# Patient Record
Sex: MALE | Race: WHITE | NOT HISPANIC OR LATINO | Employment: OTHER | URBAN - METROPOLITAN AREA
[De-identification: names, ages, dates, MRNs, and addresses within clinical notes are randomized per-mention and may not be internally consistent; named-entity substitution may affect disease eponyms.]

---

## 2020-06-22 NOTE — PROGRESS NOTES
Subjective:     CC:  Diagnoses of Obstructive sleep apnea, Mild asthma without complication, unspecified whether persistent, Hypertension, unspecified type, Obsessive-compulsive disorder, unspecified type, Attention deficit hyperactivity disorder (ADHD), combined type, Non-toxic multinodular goiter, History of squamous cell carcinoma of skin, Screening for colorectal cancer, Overweight, Skin lump of leg, right, and Vitamin D deficiency were pertinent to this visit.    HISTORY OF THE PRESENT ILLNESS: Patient is a 64 y.o. male. This pleasant patient is here today to establish care and discuss medication refills. His prior PCP was Dr. De Paz in [place].    Obstructive sleep apnea  Pt has been on CPAP for 1.5 years and reports being compliant.      Asthma  Onset: early 2020  Pt states he did a pulmonary function test early 2020 that showed he has mild asthma with borderline results.  States he has never had to use asthma.  He has been exercising more to help lose weight with running without any asthma flares.    HTN (hypertension)  Onset: about 2018 when his BP was about 180.  He has been taking lisinopril since that time.  No negative Ses from lisinopril.      Obsessive compulsive disorder  Chronic, has been taking prozac 40mg since 2005 with improvement in symptoms.  No negative SEs from his medications.  No longer checks doors repetitively as he previously did.    Attention deficit hyperactivity disorder (ADHD), combined type  Chronic, states he had symptoms as a child with difficulty focusing.  States the prozac is helping keep him focused and without negative SEs.    Non-toxic multinodular goiter  Chronic.  FNA benign 2008.  7/2012 thyroid US was stable RML nodule.  7/2013 stable thyroid ultrasound, US in 3-5 years advised.  10/2019 stable US with no further workup inndicated.    History of squamous cell carcinoma of skin  Dx 2020 and s/p removal.  Was located on his left cheek.  No recurrence of abnormal skin  appearance.    Overweight  Pt has recently started exercising.  He is eating a well balanced diet.    Skin lump of leg, right  Onset: several months.  He reports it is slowly increasing in size.  He has hx of fatty tumors.  Area is not red and no discharge.      Allergies: Patient has no allergy information on record.    Current Outpatient Medications Ordered in Epic   Medication Sig Dispense Refill   • cetirizine (ZYRTEC ALLERGY) 10 MG Tab Take 10 mg by mouth every day.     • albuterol (VENTOLIN HFA) 108 (90 Base) MCG/ACT Aero Soln inhalation aerosol Inhale 2 Puffs by mouth every four hours as needed.     • Peak Flow Meter ([employer] PEAK FLOW METER) Device 1 Units by Does not apply route Once.     • Spacer/Aero-Holding Chambers (AEROCHAMBER Z-STAT PLUS/LARGE) Misc 1 Application by Does not apply route Once.     • aspirin 81 MG tablet Take 81 mg by mouth every day.     • Multiple Vitamins-Minerals (MULTIVITAMIN ADULT PO) Take 1 Tab by mouth every day.     • Vitamin D, Ergocalciferol, 50 MCG (2000 UT) Cap Take 3 Tabs by mouth every day.     • DHA-EPA-Vitamin E (OMEGA-3 COMPLEX PO) Take 1 tablet by mouth every day.     • Saw Palmetto, Serenoa repens, (SAW PALMETTO PO) Take 320 mg by mouth every day.     • fluoxetine (PROZAC) 40 MG capsule Take 1 Cap by mouth every day. 90 Cap 1   • lisinopril (PRINIVIL) 40 MG tablet Take 1 Tab by mouth every day. 90 Tab 1     No current Epic-ordered facility-administered medications on file.        History reviewed. No pertinent past medical history.    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       Social History     Tobacco Use   • Smoking status: Never Smoker   • Smokeless tobacco: Never Used   Substance Use Topics   • Alcohol use: Yes     Frequency: 4 or more times a week     Drinks per session: 1 or 2   • Drug use: Not Currently       Social History     Social History Narrative   • Not on file        Family History   Problem Relation Age of Onset   • Cancer Mother         breast cancer   • Hypertension Mother    • Stroke Father    • Cancer Other         prostate cancer   • Psychiatric Illness Other         depression   • Migraines Other    • Diabetes Neg Hx    • Hyperlipidemia Neg Hx        Health Maintenance: Pt states he recently got his shingles vaccines.  He is due for colonoscopy    ROS:   Gen: no fevers/chills, no changes in weight  Eyes: no changes in vision  ENT: no sore throat, no hearing loss, no bloody nose  Pulm: no sob, no cough  CV: no chest pain, no palpitations  GI: no nausea/vomiting, no diarrhea  : no dysuria  MSk: no myalgias  Skin: no rash  Neuro: no headaches, no numbness/tingling  Heme/Lymph: no easy bruising      Objective:     Exam: /74 (BP Location: Left arm, Patient Position: Sitting, BP Cuff Size: Adult long)   Pulse (!) 55   Temp 36.6 °C (97.9 °F) (Temporal)   Ht 1.829 m (6')   Wt 99.2 kg (218 lb 9.6 oz)   SpO2 97%  Body mass index is 29.65 kg/m².    General: Normal appearing. No distress.  HEENT: Normocephalic.  Canals are clear bilaterally, tympanic membranes are benign, mask in place.  Nasal and OP examinations deferred given COVID19 exposure risk  Eyes: Eyes conjunctiva clear lids without ptosis, pupils equal and reactive to light accommodation, ears normal shape and contour  Neck: Supple. Thyroid is not enlarged.  Pulmonary: Clear to ausculation.  Normal effort. No rales, ronchi, or wheezing.  Cardiovascular: Regular rate and rhythm without murmur. Carotid and radial pulses are intact and equal bilaterally.  Abdomen: Soft, nontender, nondistended. Normal bowel sounds. Liver and spleen are not palpable  Neurologic: No gross motor deficits  Lymph: No cervical or supraclavicular lymph nodes are palpable  Skin: Warm and dry.  +multiple actinic keratosis on his right arm and bilateral legs.  Right lower leg skin with a superficial about 1.5cm soft, fixed massed  without erythema or discharge.  Musculoskeletal: Normal gait. No extremity cyanosis, clubbing, or edema.  Psych: Normal mood and affect. Alert and oriented x3. Judgment and insight is normal.  PHQ2 =0    Assessment & Plan:   64 y.o. male with the following -    1. Obstructive sleep apnea  Chronic, stable, well controlled per patient with home CPAP.  -will monitor.    2. Mild asthma without complication, unspecified whether persistent  Chronic, stable, asymptomatic with no recent albuterol use.  Encouraged him to bring his inhaler with him at all times.  Will consider adding inhaled steroid in the future.    3. Hypertension, unspecified type  Chronic, stable, well controlled with lisinopril 40mg POD.  Refill given.  - lisinopril (PRINIVIL) 40 MG tablet; Take 1 Tab by mouth every day.  Dispense: 90 Tab; Refill: 1    4. Obsessive-compulsive disorder, unspecified type  Chronic, stable, well controlled with prozac 40mg POD.  Refill given.  - fluoxetine (PROZAC) 40 MG capsule; Take 1 Cap by mouth every day.  Dispense: 90 Cap; Refill: 1    5. Attention deficit hyperactivity disorder (ADHD), combined type  Chronic, stable, well controlled with prozac 40mg POD.  Refill given.  - fluoxetine (PROZAC) 40 MG capsule; Take 1 Cap by mouth every day.  Dispense: 90 Cap; Refill: 1    6. Non-toxic multinodular goiter  Chronic, asymptomatic.  Plan for repeat labs at his PE in 10/2020.    7. History of squamous cell carcinoma of skin  Pt is s/p removal.  Will monitor.  Will check his right skin lump as per below.    8. Screening for colorectal cancer  Pt desires colonoscopy for cancer screening.  He is asymptomatic.  Referral given.  - REFERRAL TO GI FOR COLONOSCOPY    9. Overweight  Chronic, stable.  Pt states he recently started exercising.  Dietary and exercising guidance given.  Will monitor    10. Skin lump of leg, right  New issue.  Likely lipoma given his history but will check US to for cyst vs mass (given he has hx of squamous  cancer on cheek).   - US-EXTREMITY NON VASCULAR UNILATERAL RIGHT; Future    11. Vitamin D deficiency  Pt has been on chronic vitamin d supplements but recently over the last few months increased from 2000IU daily to 6000IU daily.  Will check labs and adjust supplement prn  - REFERRAL TO GI FOR COLONOSCOPY  - VITAMIN 1,25 DIHYDROXY; Future      Return in about 1 week (around 7/1/2020) for skin lesion removal.     Pt to return 10-11/2020 for PE with labs.    Please note that this dictation was created using voice recognition software. I have made every reasonable attempt to correct obvious errors, but I expect that there are errors of grammar and possibly content that I did not discover before finalizing the note.

## 2020-06-24 ENCOUNTER — OFFICE VISIT (OUTPATIENT)
Dept: MEDICAL GROUP | Facility: MEDICAL CENTER | Age: 65
End: 2020-06-24
Payer: COMMERCIAL

## 2020-06-24 ENCOUNTER — HOSPITAL ENCOUNTER (OUTPATIENT)
Dept: LAB | Facility: MEDICAL CENTER | Age: 65
End: 2020-06-24
Attending: FAMILY MEDICINE
Payer: COMMERCIAL

## 2020-06-24 VITALS
HEART RATE: 55 BPM | DIASTOLIC BLOOD PRESSURE: 74 MMHG | BODY MASS INDEX: 29.61 KG/M2 | WEIGHT: 218.6 LBS | SYSTOLIC BLOOD PRESSURE: 116 MMHG | HEIGHT: 72 IN | OXYGEN SATURATION: 97 % | TEMPERATURE: 97.9 F

## 2020-06-24 DIAGNOSIS — R22.41 SKIN LUMP OF LEG, RIGHT: ICD-10-CM

## 2020-06-24 DIAGNOSIS — Z85.828 HISTORY OF SQUAMOUS CELL CARCINOMA OF SKIN: ICD-10-CM

## 2020-06-24 DIAGNOSIS — F90.2 ATTENTION DEFICIT HYPERACTIVITY DISORDER (ADHD), COMBINED TYPE: ICD-10-CM

## 2020-06-24 DIAGNOSIS — Z12.11 SCREENING FOR COLORECTAL CANCER: ICD-10-CM

## 2020-06-24 DIAGNOSIS — I10 HYPERTENSION, UNSPECIFIED TYPE: ICD-10-CM

## 2020-06-24 DIAGNOSIS — E04.2 NON-TOXIC MULTINODULAR GOITER: ICD-10-CM

## 2020-06-24 DIAGNOSIS — E55.9 VITAMIN D DEFICIENCY: ICD-10-CM

## 2020-06-24 DIAGNOSIS — F42.9 OBSESSIVE-COMPULSIVE DISORDER, UNSPECIFIED TYPE: ICD-10-CM

## 2020-06-24 DIAGNOSIS — G47.33 OBSTRUCTIVE SLEEP APNEA: ICD-10-CM

## 2020-06-24 DIAGNOSIS — J45.909 MILD ASTHMA WITHOUT COMPLICATION, UNSPECIFIED WHETHER PERSISTENT: ICD-10-CM

## 2020-06-24 DIAGNOSIS — Z12.12 SCREENING FOR COLORECTAL CANCER: ICD-10-CM

## 2020-06-24 DIAGNOSIS — E66.3 OVERWEIGHT: ICD-10-CM

## 2020-06-24 PROCEDURE — 36415 COLL VENOUS BLD VENIPUNCTURE: CPT

## 2020-06-24 PROCEDURE — 82652 VIT D 1 25-DIHYDROXY: CPT

## 2020-06-24 PROCEDURE — 99204 OFFICE O/P NEW MOD 45 MIN: CPT | Performed by: FAMILY MEDICINE

## 2020-06-24 RX ORDER — CETIRIZINE HYDROCHLORIDE 10 MG/1
10 TABLET ORAL PRN
COMMUNITY

## 2020-06-24 RX ORDER — LISINOPRIL 40 MG/1
40 TABLET ORAL DAILY
Qty: 90 TAB | Refills: 1 | Status: SHIPPED | OUTPATIENT
Start: 2020-06-24 | End: 2021-02-13

## 2020-06-24 RX ORDER — ALBUTEROL SULFATE 90 UG/1
2 AEROSOL, METERED RESPIRATORY (INHALATION) EVERY 4 HOURS PRN
COMMUNITY
Start: 2020-02-24 | End: 2020-09-21 | Stop reason: SDUPTHER

## 2020-06-24 RX ORDER — LISINOPRIL 40 MG/1
40 TABLET ORAL DAILY
COMMUNITY
End: 2020-06-24 | Stop reason: SDUPTHER

## 2020-06-24 RX ORDER — FLUOXETINE HYDROCHLORIDE 40 MG/1
40 CAPSULE ORAL DAILY
COMMUNITY
End: 2020-06-24 | Stop reason: SDUPTHER

## 2020-06-24 RX ORDER — ERGOCALCIFEROL (VITAMIN D2) 50 MCG
3 CAPSULE ORAL DAILY
COMMUNITY

## 2020-06-24 RX ORDER — FLUOXETINE HYDROCHLORIDE 40 MG/1
40 CAPSULE ORAL DAILY
Qty: 90 CAP | Refills: 1 | Status: SHIPPED | OUTPATIENT
Start: 2020-06-24 | End: 2020-09-21 | Stop reason: SDUPTHER

## 2020-06-24 RX ORDER — PEAK FLOW METER
1 EACH MISCELLANEOUS ONCE
COMMUNITY
Start: 2020-02-24 | End: 2022-02-23

## 2020-06-24 RX ORDER — INHALER,ASSIST DEVICE,LG MASK
1 SPACER (EA) MISCELLANEOUS ONCE
COMMUNITY
Start: 2020-02-24 | End: 2022-02-23

## 2020-06-24 SDOH — HEALTH STABILITY: MENTAL HEALTH: HOW MANY STANDARD DRINKS CONTAINING ALCOHOL DO YOU HAVE ON A TYPICAL DAY?: 1 OR 2

## 2020-06-24 SDOH — HEALTH STABILITY: MENTAL HEALTH: HOW OFTEN DO YOU HAVE A DRINK CONTAINING ALCOHOL?: 4 OR MORE TIMES A WEEK

## 2020-06-24 ASSESSMENT — PATIENT HEALTH QUESTIONNAIRE - PHQ9: CLINICAL INTERPRETATION OF PHQ2 SCORE: 0

## 2020-06-24 NOTE — ASSESSMENT & PLAN NOTE
Onset: about 2018 when his BP was about 180.  He has been taking lisinopril since that time.  No negative Ses from lisinopril.

## 2020-06-24 NOTE — ASSESSMENT & PLAN NOTE
Onset: several months.  He reports it is slowly increasing in size.  He has hx of fatty tumors.  Area is not red and no discharge.

## 2020-06-24 NOTE — ASSESSMENT & PLAN NOTE
Onset: early 2020  Pt states he did a pulmonary function test early 2020 that showed he has mild asthma with borderline results.  States he has never had to use asthma.  He has been exercising more to help lose weight with running without any asthma flares.

## 2020-06-24 NOTE — ASSESSMENT & PLAN NOTE
Chronic, states he had symptoms as a child with difficulty focusing.  States the prozac is helping keep him focused and without negative SEs.

## 2020-06-24 NOTE — ASSESSMENT & PLAN NOTE
Chronic.  FNA benign 2008.  7/2012 thyroid US was stable RML nodule.  7/2013 stable thyroid ultrasound, US in 3-5 years advised.  10/2019 stable US with no further workup inndicated.

## 2020-06-24 NOTE — ASSESSMENT & PLAN NOTE
Dx 2020 and s/p removal.  Was located on his left cheek.  No recurrence of abnormal skin appearance.

## 2020-06-24 NOTE — ASSESSMENT & PLAN NOTE
Chronic, has been taking prozac 40mg since 2005 with improvement in symptoms.  No negative SEs from his medications.  No longer checks doors repetitively as he previously did.

## 2020-06-26 LAB — 1,25(OH)2D3 SERPL-MCNC: 69.4 PG/ML (ref 19.9–79.3)

## 2020-06-29 ENCOUNTER — HOSPITAL ENCOUNTER (OUTPATIENT)
Dept: RADIOLOGY | Facility: MEDICAL CENTER | Age: 65
End: 2020-06-29
Attending: FAMILY MEDICINE
Payer: COMMERCIAL

## 2020-06-29 DIAGNOSIS — R22.41 SKIN LUMP OF LEG, RIGHT: ICD-10-CM

## 2020-06-29 PROCEDURE — 76882 US LMTD JT/FCL EVL NVASC XTR: CPT | Mod: RT

## 2020-06-30 PROBLEM — L57.0 AK (ACTINIC KERATOSIS): Status: ACTIVE | Noted: 2020-06-30

## 2020-06-30 NOTE — PROGRESS NOTES
Subjective:     CC: actinic keratosis removal, vaccine up date    HPI:   Martin presents today with     AK (actinic keratosis)  Pt would like skin lesions removed today. No fevers, chills, discharge.  He has lesions on his arms and legs.    Overweight  Pt has reduced his fatty food intake since last visit.    Skin lump of leg, right  Onset about 1/2020.  Pt states it is slowly increasing in size but no significant changed compared to last visit.  US previously showed no clear etiology.  Area is not red and no discharge.      No past medical history on file.    Social History     Tobacco Use   • Smoking status: Never Smoker   • Smokeless tobacco: Never Used   Substance Use Topics   • Alcohol use: Yes     Frequency: 4 or more times a week     Drinks per session: 1 or 2   • Drug use: Not Currently       Current Outpatient Medications Ordered in Epic   Medication Sig Dispense Refill   • cetirizine (ZYRTEC ALLERGY) 10 MG Tab Take 10 mg by mouth every day.     • albuterol (VENTOLIN HFA) 108 (90 Base) MCG/ACT Aero Soln inhalation aerosol Inhale 2 Puffs by mouth every four hours as needed.     • Spacer/Aero-Holding Chambers (AEROCHAMBER Z-STAT PLUS/LARGE) Misc 1 Application by Does not apply route Once.     • aspirin 81 MG tablet Take 81 mg by mouth every day.     • Multiple Vitamins-Minerals (MULTIVITAMIN ADULT PO) Take 1 Tab by mouth every day.     • Vitamin D, Ergocalciferol, 50 MCG (2000 UT) Cap Take 3 Tabs by mouth every day.     • DHA-EPA-Vitamin E (OMEGA-3 COMPLEX PO) Take 1 tablet by mouth every day.     • Saw Palmetto, Serenoa repens, (SAW PALMETTO PO) Take 320 mg by mouth every day.     • fluoxetine (PROZAC) 40 MG capsule Take 1 Cap by mouth every day. 90 Cap 1   • lisinopril (PRINIVIL) 40 MG tablet Take 1 Tab by mouth every day. 90 Tab 1   • Peak Flow Meter ([employer] PEAK FLOW METER) Device 1 Units by Does not apply route Once.       No current Epic-ordered facility-administered medications on file.         Allergies:  Patient has no known allergies.    Health Maintenance: pneumonia vaccine today, colonoscopy ordered last visit    ROS:  Gen: no fevers/chills, no changes in weight  Eyes: no changes in vision  ENT: no sore throat, no hearing loss, no bloody nose  Pulm: no sob, no cough  CV: no chest pain, no palpitations  GI: no nausea/vomiting, no diarrhea  : no dysuria  MSk: no myalgias  Skin: no rash  Neuro: no headaches, no numbness/tingling  Heme/Lymph: no easy bruising      Objective:     Exam:  /78 (BP Location: Right arm, Patient Position: Sitting, BP Cuff Size: Adult long)   Pulse 61   Temp 36.5 °C (97.7 °F) (Temporal)   Resp 16   Ht 1.829 m (6')   Wt 99.8 kg (220 lb)   SpO2 94%   BMI 29.84 kg/m²  Body mass index is 29.84 kg/m².    Gen: Alert and oriented, No apparent distress.  Neck: Neck is supple without lymphadenopathy.  Lungs: Normal effort, CTA bilaterally, no wheezes, rhonchi, or rales  CV: Regular rate and rhythm. No murmurs, rubs, or gallops.  Ext: No clubbing, cyanosis, edema.  +multiple irregular scaly stuck-on appearance on on his right arm, left arm and bilateral legs.  Right lower leg skin with a superficial about 1.5cm soft, fixed massed without erythema or discharge.    Patient has hx of skin cancer and would like the lesions removed      PROCEDURE: CRYOTHERAPY  Discussed risks and benefits of cryotherapy including but not limited to scarring, hyperpigmentation, hypopigmentation, hypertrophic scarring, keiloid scarring, incomplete or no resolution of lesions treated,pain, undesirable cosemetic result, blistering, potential need for additional treatment including more invasive treatment. Patient expresses understanding and verbally acknowledges risks and consent to treatment. 2  applications of cryotherapy with 3 second freeze thaw cycle was applied to  7AK's on left leg, 4AKs on left leg, 2ALs on right arm.  Patient tolerated procedure well. There were no complications.  Aftercare instructions given.      Assessment & Plan:     64 y.o. male with the following -     1. AK (actinic keratosis)  Chronic issue, stable.  Given risk for lesions becoming cancerous in the future, cryotherapy offered and performed today.  After care instructions given and pt to follow-up if lesions persist or reoccur. Patient expressed understanding and agreement with plan.    2. Need for vaccination  Pt has asthma, but reports not having his pneumonia shot in the past. Pt desires vaccination.  Risks and benefits discussed.  No contraindications today.  Vaccine given.  Follow-up precautions discussed.  - Pneumovax Vaccine (PPSV23)    3. Overweight  Chronic, stable.  Dietary and exercise guidance given.    4. Skin lump of leg, right  Chronic, slowly increasing in size per patient report.  Recent US without clear etiology noted.  The lesion is soft but appears fixed and does not move despite moving his skin.  Gen surgery referral given for evaluation and possible biopsy. Patient expressed understanding and agreement with plan.  - REFERRAL TO GENERAL SURGERY      Return in about 5 months (around 12/1/2020) for Annual.    Please note that this dictation was created using voice recognition software. I have made every reasonable attempt to correct obvious errors, but I expect that there are errors of grammar and possibly content that I did not discover before finalizing the note.

## 2020-07-01 ENCOUNTER — OFFICE VISIT (OUTPATIENT)
Dept: MEDICAL GROUP | Facility: MEDICAL CENTER | Age: 65
End: 2020-07-01
Payer: COMMERCIAL

## 2020-07-01 VITALS
OXYGEN SATURATION: 94 % | RESPIRATION RATE: 16 BRPM | WEIGHT: 220 LBS | TEMPERATURE: 97.7 F | DIASTOLIC BLOOD PRESSURE: 78 MMHG | HEART RATE: 61 BPM | SYSTOLIC BLOOD PRESSURE: 138 MMHG | HEIGHT: 72 IN | BODY MASS INDEX: 29.8 KG/M2

## 2020-07-01 DIAGNOSIS — Z23 NEED FOR VACCINATION: ICD-10-CM

## 2020-07-01 DIAGNOSIS — E66.3 OVERWEIGHT: ICD-10-CM

## 2020-07-01 DIAGNOSIS — R22.41 SKIN LUMP OF LEG, RIGHT: ICD-10-CM

## 2020-07-01 DIAGNOSIS — L57.0 AK (ACTINIC KERATOSIS): ICD-10-CM

## 2020-07-01 PROCEDURE — 99213 OFFICE O/P EST LOW 20 MIN: CPT | Mod: 25 | Performed by: FAMILY MEDICINE

## 2020-07-01 PROCEDURE — 90471 IMMUNIZATION ADMIN: CPT | Performed by: FAMILY MEDICINE

## 2020-07-01 PROCEDURE — 17003 DESTRUCT PREMALG LES 2-14: CPT | Performed by: FAMILY MEDICINE

## 2020-07-01 PROCEDURE — 17000 DESTRUCT PREMALG LESION: CPT | Performed by: FAMILY MEDICINE

## 2020-07-01 PROCEDURE — 90732 PPSV23 VACC 2 YRS+ SUBQ/IM: CPT | Performed by: FAMILY MEDICINE

## 2020-07-02 NOTE — ASSESSMENT & PLAN NOTE
Onset about 1/2020.  Pt states it is slowly increasing in size but no significant changed compared to last visit.  US previously showed no clear etiology.  Area is not red and no discharge.

## 2020-07-02 NOTE — ASSESSMENT & PLAN NOTE
Pt would like skin lesions removed today. No fevers, chills, discharge.  He has lesions on his arms and legs.

## 2020-07-08 ENCOUNTER — PATIENT MESSAGE (OUTPATIENT)
Dept: MEDICAL GROUP | Facility: MEDICAL CENTER | Age: 65
End: 2020-07-08

## 2020-07-08 DIAGNOSIS — K58.8 OTHER IRRITABLE BOWEL SYNDROME: ICD-10-CM

## 2020-07-10 ENCOUNTER — PATIENT MESSAGE (OUTPATIENT)
Dept: MEDICAL GROUP | Facility: MEDICAL CENTER | Age: 65
End: 2020-07-10

## 2020-07-13 ENCOUNTER — PATIENT MESSAGE (OUTPATIENT)
Dept: MEDICAL GROUP | Facility: MEDICAL CENTER | Age: 65
End: 2020-07-13

## 2020-07-13 NOTE — TELEPHONE ENCOUNTER
From: Martin Chung  To: Carolyne Sousa D.O.  Sent: 7/10/2020 8:38 AM PDT  Subject: Non-Urgent Medical Question    Hi Dr. Sousa,    I will need to order more supplies for my C-PAP Machine such as a mask, head gear, filters, tubing etc. and want to know how to make that happen! Please let me know what the next steps are to facilitate that process.    Thanks,    Chucky Chung-Member # [phone]      ----- Message -----   From:Carolyne Sousa D.O.   Sent:7/8/2020 5:15 PM PDT   To:Martin Chung   Subject:RE: Non-Urgent Medical Question    Good afternoon,    I have placed a referral for you to see a gastroenterologist regarding irritable bowel symptoms and to discuss colon screening recommendations. Please contact our referral department in about 3 days to see if it has been approved. Their phone number is [phone].     I hope you're having a great day,    Dr. Sousa      ----- Message -----   From:Martin Chung   Sent:7/8/2020 10:13 AM PDT   To:Carolyne Sousa D.O.   Subject:Non-Urgent Medical Question    Karissa Sousa & Team!    I wanted to set an appointment with a digestive health specialist about irritable bowel syndrome and I am hopeful to get a referral for that disease state in lieu of postponing the colonoscopy until later this year!    Thanks, Chucky Chung-[facility] Member # [phone]

## 2020-07-13 NOTE — TELEPHONE ENCOUNTER
From: Martin Chung  To: Carolyne Sousa D.O.  Sent: 7/13/2020 1:53 PM PDT  Subject: Non-Urgent Medical Question    Hi Dr Sousa,    I believe the supplier that I have used before, through [employer], was [employer]. I am currently using the [employer]Med AirFit-F20 (M) QuietAir Headgear, Frame System and Cushion, including a Medium Size Full Mask that covers both my nose and my mouth. I also like the 6 FT Flex-Lite Tubing 15 MM-Grey along with the Air Filters for my [employer]Med AirSense 10 Autoset C-PAP Machine. I hope this is helpful and I appreciate all your support!    Thanks!    Chucky Chung      ----- Message -----   From:Carolyne Sousa D.O.   Sent:7/13/2020 1:03 PM PDT   To:Martin Chung   Subject:RE: Non-Urgent Medical Question    Which DME supplier do you get your supplies from? I need to contact them to get the order form to request refills. Also, what size mask, type of tubing, size of head gear, etc do you use, so I can submit the refill request.     Thank you,    Dr. Sousa      ----- Message -----   From:Martin Chung   Sent:7/10/2020 8:38 AM PDT   To:Carolyne Sousa D.O.   Subject:Non-Urgent Medical Question    Hi Dr. Sousa,    I will need to order more supplies for my C-PAP Machine such as a mask, head gear, filters, tubing etc. and want to know how to make that happen! Please let me know what the next steps are to facilitate that process.    Thanks,    Chucky Chung-Member # [id number]      ----- Message -----   From:Carolyne Sousa D.O.   Sent:7/8/2020 5:15 PM PDT   To:Martin Chung   Subject:RE: Non-Urgent Medical Question    Good afternoon,    I have placed a referral for you to see a gastroenterologist regarding irritable bowel symptoms and to discuss colon screening recommendations. Please contact our referral department in about 3 days to see if it has been approved. Their phone number is [phone].     I hope you're having a great day,    Dr. Sousa      ----- Message -----    From:Martin Chung   Sent:7/8/2020 10:13 AM PDT   To:Carolyne Sousa D.O.   Subject:Non-Urgent Medical Question    Hello Dr Sousa & Team!    I wanted to set an appointment with a digestive health specialist about irritable bowel syndrome and I am hopeful to get a referral for that disease state in lieu of postponing the colonoscopy until later this year!    Thanks, Chucky Chung-[facility] Member # [phone]

## 2020-09-16 ENCOUNTER — OFFICE VISIT (OUTPATIENT)
Dept: MEDICAL GROUP | Facility: MEDICAL CENTER | Age: 65
End: 2020-09-16
Payer: COMMERCIAL

## 2020-09-16 ENCOUNTER — TELEPHONE (OUTPATIENT)
Dept: MEDICAL GROUP | Facility: PHYSICIAN GROUP | Age: 65
End: 2020-09-16

## 2020-09-16 VITALS
TEMPERATURE: 97.6 F | RESPIRATION RATE: 16 BRPM | DIASTOLIC BLOOD PRESSURE: 84 MMHG | HEART RATE: 64 BPM | HEIGHT: 72 IN | SYSTOLIC BLOOD PRESSURE: 134 MMHG | BODY MASS INDEX: 28.99 KG/M2 | WEIGHT: 214 LBS | OXYGEN SATURATION: 95 %

## 2020-09-16 DIAGNOSIS — I10 HYPERTENSION, UNSPECIFIED TYPE: ICD-10-CM

## 2020-09-16 DIAGNOSIS — J45.909 MILD ASTHMA WITHOUT COMPLICATION, UNSPECIFIED WHETHER PERSISTENT: ICD-10-CM

## 2020-09-16 DIAGNOSIS — E66.3 OVERWEIGHT: ICD-10-CM

## 2020-09-16 PROCEDURE — 99213 OFFICE O/P EST LOW 20 MIN: CPT | Performed by: FAMILY MEDICINE

## 2020-09-16 RX ORDER — AMLODIPINE BESYLATE 2.5 MG/1
2.5 TABLET ORAL DAILY
Qty: 90 TAB | Refills: 0 | Status: SHIPPED | OUTPATIENT
Start: 2020-09-16 | End: 2020-09-21 | Stop reason: SDUPTHER

## 2020-09-16 SDOH — HEALTH STABILITY: MENTAL HEALTH
STRESS IS WHEN SOMEONE FEELS TENSE, NERVOUS, ANXIOUS, OR CAN'T SLEEP AT NIGHT BECAUSE THEIR MIND IS TROUBLED. HOW STRESSED ARE YOU?: NOT AT ALL

## 2020-09-16 SDOH — SOCIAL STABILITY: SOCIAL NETWORK: ARE YOU MARRIED, WIDOWED, DIVORCED, SEPARATED, NEVER MARRIED, OR LIVING WITH A PARTNER?: MARRIED

## 2020-09-16 SDOH — ECONOMIC STABILITY: HOUSING INSECURITY
IN THE LAST 12 MONTHS, WAS THERE A TIME WHEN YOU DID NOT HAVE A STEADY PLACE TO SLEEP OR SLEPT IN A SHELTER (INCLUDING NOW)?: NO

## 2020-09-16 SDOH — ECONOMIC STABILITY: INCOME INSECURITY: IN THE LAST 12 MONTHS, WAS THERE A TIME WHEN YOU WERE NOT ABLE TO PAY THE MORTGAGE OR RENT ON TIME?: NO

## 2020-09-16 SDOH — ECONOMIC STABILITY: FOOD INSECURITY: WITHIN THE PAST 12 MONTHS, YOU WORRIED THAT YOUR FOOD WOULD RUN OUT BEFORE YOU GOT MONEY TO BUY MORE.: NEVER TRUE

## 2020-09-16 SDOH — ECONOMIC STABILITY: TRANSPORTATION INSECURITY
IN THE PAST 12 MONTHS, HAS THE LACK OF TRANSPORTATION KEPT YOU FROM MEDICAL APPOINTMENTS OR FROM GETTING MEDICATIONS?: NO

## 2020-09-16 SDOH — SOCIAL STABILITY: SOCIAL NETWORK
DO YOU BELONG TO ANY CLUBS OR ORGANIZATIONS SUCH AS CHURCH GROUPS UNIONS, FRATERNAL OR ATHLETIC GROUPS, OR SCHOOL GROUPS?: YES

## 2020-09-16 SDOH — HEALTH STABILITY: PHYSICAL HEALTH: ON AVERAGE, HOW MANY MINUTES DO YOU ENGAGE IN EXERCISE AT THIS LEVEL?: 40 MINUTES

## 2020-09-16 SDOH — HEALTH STABILITY: PHYSICAL HEALTH: ON AVERAGE, HOW MANY MINUTES DO YOU ENGAGE IN EXERCISE AT THIS LEVEL?: 40 MIN

## 2020-09-16 SDOH — ECONOMIC STABILITY: TRANSPORTATION INSECURITY
IN THE PAST 12 MONTHS, HAS LACK OF RELIABLE TRANSPORTATION KEPT YOU FROM MEDICAL APPOINTMENTS, MEETINGS, WORK OR FROM GETTING THINGS NEEDED FOR DAILY LIVING?: NO

## 2020-09-16 SDOH — SOCIAL STABILITY: SOCIAL NETWORK: HOW OFTEN DO YOU ATTENT MEETINGS OF THE CLUB OR ORGANIZATION YOU BELONG TO?: MORE THAN 4 TIMES PER YEAR

## 2020-09-16 SDOH — ECONOMIC STABILITY: FOOD INSECURITY: WITHIN THE PAST 12 MONTHS, THE FOOD YOU BOUGHT JUST DIDN'T LAST AND YOU DIDN'T HAVE MONEY TO GET MORE.: NEVER TRUE

## 2020-09-16 SDOH — HEALTH STABILITY: PHYSICAL HEALTH: ON AVERAGE, HOW MANY DAYS PER WEEK DO YOU ENGAGE IN MODERATE TO STRENUOUS EXERCISE (LIKE A BRISK WALK)?: 4 DAYS

## 2020-09-16 SDOH — ECONOMIC STABILITY: INCOME INSECURITY: HOW HARD IS IT FOR YOU TO PAY FOR THE VERY BASICS LIKE FOOD, HOUSING, MEDICAL CARE, AND HEATING?: NOT HARD AT ALL

## 2020-09-16 SDOH — HEALTH STABILITY: MENTAL HEALTH: HOW OFTEN DO YOU HAVE 6 OR MORE DRINKS ON ONE OCCASION?: NEVER

## 2020-09-16 SDOH — ECONOMIC STABILITY: TRANSPORTATION INSECURITY
IN THE PAST 12 MONTHS, HAS LACK OF TRANSPORTATION KEPT YOU FROM MEETINGS, WORK, OR FROM GETTING THINGS NEEDED FOR DAILY LIVING?: NO

## 2020-09-16 SDOH — SOCIAL STABILITY: SOCIAL NETWORK: HOW OFTEN DO YOU GET TOGETHER WITH FRIENDS OR RELATIVES?: MORE THAN THREE TIMES A WEEK

## 2020-09-16 SDOH — SOCIAL STABILITY: SOCIAL NETWORK
IN A TYPICAL WEEK, HOW MANY TIMES DO YOU TALK ON THE PHONE WITH FAMILY, FRIENDS, OR NEIGHBORS?: MORE THAN THREE TIMES A WEEK

## 2020-09-16 SDOH — ECONOMIC STABILITY: HOUSING INSECURITY: IN THE LAST 12 MONTHS, HOW MANY PLACES HAVE YOU LIVED?: 2

## 2020-09-16 SDOH — SOCIAL STABILITY: SOCIAL NETWORK: HOW OFTEN DO YOU ATTEND CHURCH OR RELIGIOUS SERVICES?: MORE THAN 4 TIMES PER YEAR

## 2020-09-16 ASSESSMENT — SOCIAL DETERMINANTS OF HEALTH (SDOH)
DO YOU BELONG TO ANY CLUBS OR ORGANIZATIONS SUCH AS CHURCH GROUPS UNIONS, FRATERNAL OR ATHLETIC GROUPS, OR SCHOOL GROUPS?: YES
HOW OFTEN DO YOU ATTEND CHURCH OR RELIGIOUS SERVICES?: MORE THAN 4 TIMES PER YEAR
HOW OFTEN DO YOU HAVE SIX OR MORE DRINKS ON ONE OCCASION: NEVER
HOW OFTEN DO YOU ATTENT MEETINGS OF THE CLUB OR ORGANIZATION YOU BELONG TO?: MORE THAN 4 TIMES PER YEAR
HOW OFTEN DO YOU HAVE A DRINK CONTAINING ALCOHOL: 4 OR MORE TIMES A WEEK
HOW OFTEN DO YOU GET TOGETHER WITH FRIENDS OR RELATIVES?: MORE THAN THREE TIMES A WEEK
HOW MANY DRINKS CONTAINING ALCOHOL DO YOU HAVE ON A TYPICAL DAY WHEN YOU ARE DRINKING: 1 OR 2
WITHIN THE PAST 12 MONTHS, YOU WORRIED THAT YOUR FOOD WOULD RUN OUT BEFORE YOU GOT THE MONEY TO BUY MORE: NEVER TRUE
WITHIN THE PAST 12 MONTHS, THE FOOD YOU BOUGHT JUST DIDN'T LAST AND YOU DIDN'T HAVE MONEY TO GET MORE: NEVER TRUE
IN A TYPICAL WEEK, HOW MANY TIMES DO YOU TALK ON THE PHONE WITH FAMILY, FRIENDS, OR NEIGHBORS?: MORE THAN THREE TIMES A WEEK
HOW HARD IS IT FOR YOU TO PAY FOR THE VERY BASICS LIKE FOOD, HOUSING, MEDICAL CARE, AND HEATING?: NOT HARD AT ALL

## 2020-09-16 NOTE — PROGRESS NOTES
Subjective:     CC: HTN and sleep apnea follow-up    HPI:   Martin presents today with     HTN (hypertension)  Onset about 2018.  Home blood pressures have been mostly 120-160s.  He had one day where Bps were 170-196/94.  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 No chest pain, palpitations or SOB.  No leg swelling.  He has new CPAP machine pending arrival.  He has never had any other BP medications    Asthma  Onset early 2020.  He has been exercising regularly without any asthma flares.  He has not needed any albuterol.    Overweight  He is actively working to lose weight with diet and exercise changes.      No past medical history on file.    Social History     Tobacco Use   • Smoking status: Never Smoker   • Smokeless tobacco: Never Used   Substance Use Topics   • Alcohol use: Yes     Frequency: 4 or more times a week     Drinks per session: 1 or 2   • Drug use: Not Currently       Current Outpatient Medications Ordered in Epic   Medication Sig Dispense Refill   • amLODIPine (NORVASC) 2.5 MG Tab Take 1 Tab by mouth every day. 90 Tab 0   • cetirizine (ZYRTEC ALLERGY) 10 MG Tab Take 10 mg by mouth every day.     • albuterol (VENTOLIN HFA) 108 (90 Base) MCG/ACT Aero Soln inhalation aerosol Inhale 2 Puffs by mouth every four hours as needed.     • Peak Flow Meter ([employer] PEAK FLOW METER) Device 1 Units by Does not apply route Once.     • Spacer/Aero-Holding Chambers (AEROCHAMBER Z-STAT PLUS/LARGE) Misc 1 Application by Does not apply route Once.     • aspirin 81 MG tablet Take 81 mg by mouth every day.     • Multiple Vitamins-Minerals (MULTIVITAMIN ADULT PO) Take 1 Tab by mouth every day.     • Vitamin D, Ergocalciferol, 50 MCG (2000 UT) Cap Take 3 Tabs by mouth every day.     • DHA-EPA-Vitamin E (OMEGA-3 COMPLEX PO) Take 1 tablet by mouth every day.     • Saw Palmetto, Serenoa repens, (SAW PALMETTO PO) Take 320 mg by mouth every day.     • fluoxetine (PROZAC) 40 MG capsule Take  1 Cap by mouth every day. 90 Cap 1   • lisinopril (PRINIVIL) 40 MG tablet Take 1 Tab by mouth every day. 90 Tab 1     No current Epic-ordered facility-administered medications on file.        Allergies:  Patient has no known allergies.    Health Maintenance: Pt to get flu shot next visit    ROS:  Gen: no fevers/chills, no changes in weight  Eyes: no changes in vision  ENT: no sore throat, no hearing loss, no bloody nose  Pulm: no sob, no cough  CV: no chest pain, no palpitations  GI: no nausea/vomiting, no diarrhea  : no dysuria  MSk: no myalgias  Skin: no rash  Neuro: no headaches, no numbness/tingling  Heme/Lymph: no easy bruising      Objective:       Exam:  /84 (BP Location: Left arm, Patient Position: Sitting)   Pulse 64   Temp 36.4 °C (97.6 °F) (Temporal)   Resp 16   Ht 1.829 m (6')   Wt 97.1 kg (214 lb)   SpO2 95%   BMI 29.02 kg/m²  Body mass index is 29.02 kg/m².    Gen: Alert and oriented, No apparent distress.  Neck: Neck is supple without lymphadenopathy.  Lungs: Normal effort, CTA bilaterally, no wheezes, rhonchi, or rales  CV: Regular rate and rhythm. No murmurs, rubs, or gallops.  Ext: No clubbing, cyanosis, edema.    Assessment & Plan:     65 y.o. male with the following -     1. Hypertension, unspecified type  Chronic, worsening home Bps with multiple days with elevations of up to 140-160s and one episode of SBP in 190s.  He is asymptomatic.  Will continue lisinopril 40mg daily and add amlodipine 2.5mg daily.  SE profile discussed.  Pt to follow-up next week for BP check.  Early follow-up precautions discussed  - amLODIPine (NORVASC) 2.5 MG Tab; Take 1 Tab by mouth every day.  Dispense: 90 Tab; Refill: 0    2. Mild asthma without complication, unspecified whether persistent  Chronic, stable, well controlled.  Will monitor    3. Overweight  Chronic, improving with dietary and exercise changes.  Dietary and exercise guidance given.  Will monitor      Return in about 1 week (around  9/23/2020) for Annual.    Please note that this dictation was created using voice recognition software. I have made every reasonable attempt to correct obvious errors, but I expect that there are errors of grammar and possibly content that I did not discover before finalizing the note.

## 2020-09-17 NOTE — ASSESSMENT & PLAN NOTE
Onset early 2020.  He has been exercising regularly without any asthma flares.  He has not needed any albuterol.

## 2020-09-17 NOTE — ASSESSMENT & PLAN NOTE
Onset about 2018.  Home blood pressures have been mostly 120-160s.  He had one day where Bps were 170-196/94.  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 No chest pain, palpitations or SOB.  No leg swelling.  He has new CPAP machine pending arrival.  He has never had any other BP medications

## 2020-09-20 NOTE — PROGRESS NOTES
Subjective:     CC:   Chief Complaint   Patient presents with   • Annual Exam       HPI:   Martin Chung is a 65 y.o. male who presents for an annual exam. He is feeling well and has no complaints.    Health Maintenance  Cholesterol Screening: ordered  Diabetes Screening: ordered  Diet: eating a well balanced diet  Exercise: regular exercise  Substance Abuse: denies  Safe in relationship.   Seat belts, bike helmet, gun safety discussed.  Sun protection used.    Cancer screening  Colorectal Cancer Screening: up to date  Prostate Cancer Screening/PSA: ordered    Infectious disease screening/Immunizations  --STI Screening: declines  --Practices safe sex.  --HIV Screening:  declines  --Hepatitis C Screening: ordered  --Immunizations:    Influenza: ordered   Tetanus: due 11/24/2020   Shingles: completed     He  has no past medical history on file.  He  has a past surgical history that includes other.  Family History   Problem Relation Age of Onset   • Cancer Mother         breast cancer   • Hypertension Mother    • Stroke Father    • Cancer Other         prostate cancer   • Psychiatric Illness Other         depression   • Migraines Other    • Diabetes Neg Hx    • Hyperlipidemia Neg Hx      Social History     Tobacco Use   • Smoking status: Never Smoker   • Smokeless tobacco: Never Used   Substance Use Topics   • Alcohol use: Yes     Frequency: 4 or more times a week     Drinks per session: 1 or 2     Binge frequency: Never   • Drug use: Not Currently       Patient Active Problem List    Diagnosis Date Noted   • Other irritable bowel syndrome 07/08/2020   • AK (actinic keratosis) 06/30/2020   • History of squamous cell carcinoma of skin 06/24/2020   • Overweight 06/24/2020   • Skin lump of leg, right 06/24/2020   • Asthma 02/24/2020   • Obstructive sleep apnea 02/06/2019   • HTN (hypertension) 01/24/2019   • Attention deficit hyperactivity disorder (ADHD), combined type 07/31/2012   • Non-toxic multinodular goiter  04/03/2008   • Obsessive compulsive disorder 04/03/2008       Current Outpatient Medications   Medication Sig Dispense Refill   • fluoxetine (PROZAC) 40 MG capsule Take 1 Cap by mouth every day. 90 Cap 1   • albuterol (VENTOLIN HFA) 108 (90 Base) MCG/ACT Aero Soln inhalation aerosol Inhale 2 Puffs by mouth every four hours as needed. 8.5 g 1   • amLODIPine (NORVASC) 2.5 MG Tab Take 1 Tab by mouth every day. 90 Tab 1   • cetirizine (ZYRTEC ALLERGY) 10 MG Tab Take 10 mg by mouth every day.     • Peak Flow Meter (Powerlinx PEAK FLOW METER) Device 1 Units by Does not apply route Once.     • Spacer/Aero-Holding Chambers (AEROCHAMBER Z-STAT PLUS/LARGE) Misc 1 Application by Does not apply route Once.     • aspirin 81 MG tablet Take 81 mg by mouth every day.     • Multiple Vitamins-Minerals (MULTIVITAMIN ADULT PO) Take 1 Tab by mouth every day.     • Vitamin D, Ergocalciferol, 50 MCG (2000 UT) Cap Take 3 Tabs by mouth every day.     • DHA-EPA-Vitamin E (OMEGA-3 COMPLEX PO) Take 1 tablet by mouth every day.     • Saw Palmetto, Serenoa repens, (SAW PALMETTO PO) Take 320 mg by mouth every day.     • lisinopril (PRINIVIL) 40 MG tablet Take 1 Tab by mouth every day. 90 Tab 1     No current facility-administered medications for this visit.     (including changes today)  Allergies: Patient has no known allergies.    Review of Systems   Constitutional: Negative for fever, chills and malaise/fatigue.   HENT: Negative for congestion.    Eyes: Negative for pain.   Respiratory: Negative for cough and shortness of breath.    Cardiovascular: Negative for leg swelling.   Gastrointestinal: Negative for nausea, vomiting, abdominal pain and diarrhea.   Genitourinary: Negative for dysuria and hematuria.   Skin: Negative for rash.   Neurological: Negative for dizziness, focal weakness and headaches.   Endo/Heme/Allergies: Does not bruise/bleed easily.   Psychiatric/Behavioral: Negative for depression.  The patient is not nervous/anxious.       Objective:     /76 (BP Location: Left arm, Patient Position: Sitting, BP Cuff Size: Adult)   Pulse (!) 58   Temp 36.5 °C (97.7 °F) (Temporal)   Resp 16   Ht 1.829 m (6')   Wt 94.8 kg (209 lb)   SpO2 96%   BMI 28.35 kg/m²   Body mass index is 28.35 kg/m².  Wt Readings from Last 4 Encounters:   09/21/20 94.8 kg (209 lb)   09/16/20 97.1 kg (214 lb)   07/01/20 99.8 kg (220 lb)   06/24/20 99.2 kg (218 lb 9.6 oz)       Physical Exam:  Constitutional: Well-developed and well-nourished. Not diaphoretic. No distress.   Skin: Skin is warm and dry. No rash noted.  Head: Atraumatic without lesions.  Eyes: Conjunctivae and extraocular motions are normal. Pupils are equal, round, and reactive to light. No scleral icterus.   Ears:  External ears unremarkable. Tympanic membranes clear and intact.  Nose/Mouth/Throat: nasal and OP examinations deferred given COVID19 exposure risk   Neck: Supple, trachea midline. Normal range of motion. No thyromegaly present. No lymphadenopathy--cervical or supraclavicular.  Cardiovascular: Regular rate and rhythm, S1 and S2 without murmur, rubs, or gallops.    Lungs: Effort normal. Clear to auscultation throughout. No adventitious sounds.  Abdomen: Soft, non tender, and without distention. Active bowel sounds in all four quadrants. No rebound, guarding, masses or HSM.  : deferred  Extremities: No cyanosis, clubbing, erythema, nor edema. Distal pulses intact and symmetric.   Musculoskeletal: All major joints AROM full in all directions without pain.  Neurological: Alert and oriented x 3.  No cranial nerve deficit.   Psychiatric:  Behavior, mood, and affect are appropriate.    Assessment and Plan:     1. Health maintenance examination  Pt is here for  examinations.  Labs updated.  Vaccines updated.    - PROSTATE SPECIFIC AG SCREENING; Future    2. Need for vaccination  Pt desires vaccination.  Risks and benefits discussed.  No contraindications today.  Vaccine given.  Follow-up  precautions discussed.  - Influenza Vaccine Quad Injection (PF)    3. Non-toxic multinodular goiter  Chronic.  Last US was 10/2019 at which time imaging was stable and no further imaging was indicated for monitoring.  He is asymptomatic.  WE monitor for symptoms  -TSH ordered    4. Obsessive-compulsive disorder, unspecified type  Chronic, stable, well controlled.  Continue prozac.  Will monitor  - fluoxetine (PROZAC) 40 MG capsule; Take 1 Cap by mouth every day.  Dispense: 90 Cap; Refill: 1    5. Attention deficit hyperactivity disorder (ADHD), combined type  Chronic, stable, well controlled.  Continue prozac.  Will monitor  - fluoxetine (PROZAC) 40 MG capsule; Take 1 Cap by mouth every day.  Dispense: 90 Cap; Refill: 1    6. Obstructive sleep apnea  Chronic, stable.  Pt is pending new CPAP machine through DME provider and will notify my office if he is unable to obtain it.    7. Mild asthma without complication, unspecified whether persistent  Chronic, stable, well controlled.  Continue albuterol prn.  Follow-up precautions discussed.  - albuterol (VENTOLIN HFA) 108 (90 Base) MCG/ACT Aero Soln inhalation aerosol; Inhale 2 Puffs by mouth every four hours as needed.  Dispense: 8.5 g; Refill: 1    8. Hypertension, unspecified type  Chronic, stable, well controlled.  Refill given.  Will monitor labs  - amLODIPine (NORVASC) 2.5 MG Tab; Take 1 Tab by mouth every day.  Dispense: 90 Tab; Refill: 1  - Comp Metabolic Panel; Future  - Lipid Profile; Future    9. Encounter for screening for malignant neoplasm of prostate  He is asymptomatic with normal PSA in the past.  Risks vs benefits of testing discussed and lab ordered  - PROSTATE SPECIFIC AG SCREENING; Future    10. Vitamin D deficiency  Chronic. Unclear control.    - VITAMIN 1,25 DIHYDROXY; Future    11. Need for hepatitis C screening test  Pt is due for hep C screening per USPTF guidelines and agrees to obtaining screening testing.  - HCV Scrn ( [id number] [employer]);  Future    HCM: as per above  Labs per orders.  Vaccinations per orders.  Counseling about diet, supplements, exercise, skin care and safe sex.    Follow-up: Return in about 6 months (around 3/21/2021) for Medication review.

## 2020-09-21 ENCOUNTER — OFFICE VISIT (OUTPATIENT)
Dept: MEDICAL GROUP | Facility: MEDICAL CENTER | Age: 65
End: 2020-09-21
Payer: COMMERCIAL

## 2020-09-21 ENCOUNTER — HOSPITAL ENCOUNTER (OUTPATIENT)
Dept: LAB | Facility: MEDICAL CENTER | Age: 65
End: 2020-09-21
Attending: FAMILY MEDICINE
Payer: COMMERCIAL

## 2020-09-21 VITALS
TEMPERATURE: 97.7 F | BODY MASS INDEX: 28.31 KG/M2 | DIASTOLIC BLOOD PRESSURE: 76 MMHG | OXYGEN SATURATION: 96 % | SYSTOLIC BLOOD PRESSURE: 130 MMHG | RESPIRATION RATE: 16 BRPM | HEART RATE: 58 BPM | HEIGHT: 72 IN | WEIGHT: 209 LBS

## 2020-09-21 DIAGNOSIS — E55.9 VITAMIN D DEFICIENCY: ICD-10-CM

## 2020-09-21 DIAGNOSIS — Z23 NEED FOR VACCINATION: ICD-10-CM

## 2020-09-21 DIAGNOSIS — Z12.5 ENCOUNTER FOR SCREENING FOR MALIGNANT NEOPLASM OF PROSTATE: ICD-10-CM

## 2020-09-21 DIAGNOSIS — I10 HYPERTENSION, UNSPECIFIED TYPE: ICD-10-CM

## 2020-09-21 DIAGNOSIS — F90.2 ATTENTION DEFICIT HYPERACTIVITY DISORDER (ADHD), COMBINED TYPE: ICD-10-CM

## 2020-09-21 DIAGNOSIS — J45.909 MILD ASTHMA WITHOUT COMPLICATION, UNSPECIFIED WHETHER PERSISTENT: ICD-10-CM

## 2020-09-21 DIAGNOSIS — Z00.00 HEALTH MAINTENANCE EXAMINATION: ICD-10-CM

## 2020-09-21 DIAGNOSIS — F42.9 OBSESSIVE-COMPULSIVE DISORDER, UNSPECIFIED TYPE: ICD-10-CM

## 2020-09-21 DIAGNOSIS — Z11.59 NEED FOR HEPATITIS C SCREENING TEST: ICD-10-CM

## 2020-09-21 DIAGNOSIS — E04.2 NON-TOXIC MULTINODULAR GOITER: ICD-10-CM

## 2020-09-21 DIAGNOSIS — G47.33 OBSTRUCTIVE SLEEP APNEA: ICD-10-CM

## 2020-09-21 LAB
ALBUMIN SERPL BCP-MCNC: 4.4 G/DL (ref 3.2–4.9)
ALBUMIN/GLOB SERPL: 1.8 G/DL
ALP SERPL-CCNC: 81 U/L (ref 30–99)
ALT SERPL-CCNC: 24 U/L (ref 2–50)
ANION GAP SERPL CALC-SCNC: 11 MMOL/L (ref 7–16)
AST SERPL-CCNC: 21 U/L (ref 12–45)
BILIRUB SERPL-MCNC: 0.6 MG/DL (ref 0.1–1.5)
BUN SERPL-MCNC: 10 MG/DL (ref 8–22)
CALCIUM SERPL-MCNC: 9.1 MG/DL (ref 8.5–10.5)
CHLORIDE SERPL-SCNC: 101 MMOL/L (ref 96–112)
CHOLEST SERPL-MCNC: 162 MG/DL (ref 100–199)
CO2 SERPL-SCNC: 26 MMOL/L (ref 20–33)
CREAT SERPL-MCNC: 0.8 MG/DL (ref 0.5–1.4)
GLOBULIN SER CALC-MCNC: 2.5 G/DL (ref 1.9–3.5)
GLUCOSE SERPL-MCNC: 108 MG/DL (ref 65–99)
HCV AB SER QL: NORMAL
HDLC SERPL-MCNC: 40 MG/DL
LDLC SERPL CALC-MCNC: 89 MG/DL
POTASSIUM SERPL-SCNC: 4.2 MMOL/L (ref 3.6–5.5)
PROT SERPL-MCNC: 6.9 G/DL (ref 6–8.2)
PSA SERPL-MCNC: 1.47 NG/ML (ref 0–4)
SODIUM SERPL-SCNC: 138 MMOL/L (ref 135–145)
TRIGL SERPL-MCNC: 163 MG/DL (ref 0–149)

## 2020-09-21 PROCEDURE — 90686 IIV4 VACC NO PRSV 0.5 ML IM: CPT | Performed by: FAMILY MEDICINE

## 2020-09-21 PROCEDURE — 82652 VIT D 1 25-DIHYDROXY: CPT

## 2020-09-21 PROCEDURE — 99396 PREV VISIT EST AGE 40-64: CPT | Mod: 25 | Performed by: FAMILY MEDICINE

## 2020-09-21 PROCEDURE — 80053 COMPREHEN METABOLIC PANEL: CPT

## 2020-09-21 PROCEDURE — 36415 COLL VENOUS BLD VENIPUNCTURE: CPT

## 2020-09-21 PROCEDURE — 80061 LIPID PANEL: CPT

## 2020-09-21 PROCEDURE — 90471 IMMUNIZATION ADMIN: CPT | Performed by: FAMILY MEDICINE

## 2020-09-21 PROCEDURE — 84153 ASSAY OF PSA TOTAL: CPT

## 2020-09-21 PROCEDURE — G0472 HEP C SCREEN HIGH RISK/OTHER: HCPCS

## 2020-09-21 RX ORDER — FLUOXETINE HYDROCHLORIDE 40 MG/1
40 CAPSULE ORAL DAILY
Qty: 90 CAP | Refills: 1 | Status: SHIPPED | OUTPATIENT
Start: 2020-09-21 | End: 2021-01-04

## 2020-09-21 RX ORDER — ALBUTEROL SULFATE 90 UG/1
2 AEROSOL, METERED RESPIRATORY (INHALATION) EVERY 4 HOURS PRN
Qty: 8.5 G | Refills: 1 | Status: SHIPPED | OUTPATIENT
Start: 2020-09-21 | End: 2021-04-14 | Stop reason: SDUPTHER

## 2020-09-21 RX ORDER — AMLODIPINE BESYLATE 2.5 MG/1
2.5 TABLET ORAL DAILY
Qty: 90 TAB | Refills: 1 | Status: SHIPPED | OUTPATIENT
Start: 2020-09-21 | End: 2020-12-21

## 2020-09-21 NOTE — ASSESSMENT & PLAN NOTE
Chronic.  States he is not having negative Ses from prozac.  He would like refill.  He is not doing repetitive checking of doors

## 2020-09-21 NOTE — ASSESSMENT & PLAN NOTE
Chronic.  No hair or skin changes.  Has some posterior scalp decrease hair.  He is stooling normally.  He had US 10/2019 which was reported as stable.  He would like repeat US

## 2020-09-23 LAB — 1,25(OH)2D3 SERPL-MCNC: 48.4 PG/ML (ref 19.9–79.3)

## 2020-10-07 ENCOUNTER — HOSPITAL ENCOUNTER (OUTPATIENT)
Dept: RADIOLOGY | Facility: MEDICAL CENTER | Age: 65
End: 2020-10-07
Attending: SURGERY
Payer: COMMERCIAL

## 2020-10-07 DIAGNOSIS — I83.92 VARICOSE VEINS OF LEFT LOWER EXTREMITY, UNSPECIFIED WHETHER COMPLICATED: ICD-10-CM

## 2020-10-07 PROCEDURE — 93970 EXTREMITY STUDY: CPT

## 2020-10-07 PROCEDURE — 93970 EXTREMITY STUDY: CPT | Mod: 26 | Performed by: INTERNAL MEDICINE

## 2020-10-14 ENCOUNTER — OFFICE VISIT (OUTPATIENT)
Dept: MEDICAL GROUP | Facility: MEDICAL CENTER | Age: 65
End: 2020-10-14
Payer: COMMERCIAL

## 2020-10-14 VITALS
WEIGHT: 215 LBS | DIASTOLIC BLOOD PRESSURE: 78 MMHG | OXYGEN SATURATION: 95 % | TEMPERATURE: 97.8 F | BODY MASS INDEX: 29.12 KG/M2 | SYSTOLIC BLOOD PRESSURE: 128 MMHG | RESPIRATION RATE: 14 BRPM | HEIGHT: 72 IN | HEART RATE: 70 BPM

## 2020-10-14 DIAGNOSIS — I45.10 INCOMPLETE RIGHT BUNDLE BRANCH BLOCK (RBBB) DETERMINED BY ELECTROCARDIOGRAPHY: ICD-10-CM

## 2020-10-14 DIAGNOSIS — R07.89 OTHER CHEST PAIN: ICD-10-CM

## 2020-10-14 DIAGNOSIS — I10 HYPERTENSION, UNSPECIFIED TYPE: ICD-10-CM

## 2020-10-14 PROCEDURE — 99213 OFFICE O/P EST LOW 20 MIN: CPT | Performed by: FAMILY MEDICINE

## 2020-10-14 PROCEDURE — 93000 ELECTROCARDIOGRAM COMPLETE: CPT | Performed by: FAMILY MEDICINE

## 2020-10-14 NOTE — PROGRESS NOTES
Subjective:     CC: desires EKG and ECHO given recent chest pain    HPI:   Martin presents today with     HTN (hypertension)  Onset about 2018.  Home blood pressures since last visit are consistently between 104-140/70-80s.  HR in 50-60s.  No chest pain, palpitations or SOB.  No leg swelling.  States that he went to a wedding at the end of August 2020 and had some chest discomfort at that time.  No recurrence of symptoms.  States that his discomfort was any achy pain, lasted about 1 hour.  He had eaten a lot and had some alcohol, but no other known triggers.  He was doing minimal dancing at that time.  Pain was substernal and did not radiate.  No associated nausea, vomiting, tingling at that time, but gets rare left and right arm tingling.    Other chest pain  States that he went to a wedding at the end of August 2020 and had some chest discomfort at that time.  No recurrence of symptoms.  States that his discomfort was any achy pain, lasted about 1 hour.  He had eaten a lot and had some alcohol, but no other known triggers.  He was doing minimal dancing at that time.  Pain was substernal and did not radiate.  No associated nausea, vomiting, tingling at that time, but gets rare left and right arm tingling.        History reviewed. No pertinent past medical history.    Social History     Tobacco Use   • Smoking status: Never Smoker   • Smokeless tobacco: Never Used   Substance Use Topics   • Alcohol use: Yes     Frequency: 4 or more times a week     Drinks per session: 1 or 2     Binge frequency: Never   • Drug use: Not Currently       Current Outpatient Medications Ordered in Epic   Medication Sig Dispense Refill   • fluoxetine (PROZAC) 40 MG capsule Take 1 Cap by mouth every day. 90 Cap 1   • albuterol (VENTOLIN HFA) 108 (90 Base) MCG/ACT Aero Soln inhalation aerosol Inhale 2 Puffs by mouth every four hours as needed. 8.5 g 1   • amLODIPine (NORVASC) 2.5 MG Tab Take 1 Tab by mouth every day. 90 Tab 1   •  cetirizine (ZYRTEC ALLERGY) 10 MG Tab Take 10 mg by mouth every day.     • Peak Flow Meter (TRUZONE PEAK FLOW METER) Device 1 Units by Does not apply route Once.     • Spacer/Aero-Holding Chambers (AEROCHAMBER Z-STAT PLUS/LARGE) Misc 1 Application by Does not apply route Once.     • aspirin 81 MG tablet Take 81 mg by mouth every day.     • Multiple Vitamins-Minerals (MULTIVITAMIN ADULT PO) Take 1 Tab by mouth every day.     • Vitamin D, Ergocalciferol, 50 MCG (2000 UT) Cap Take 3 Tabs by mouth every day.     • DHA-EPA-Vitamin E (OMEGA-3 COMPLEX PO) Take 1 tablet by mouth every day.     • Saw Palmetto, Serenoa repens, (SAW PALMETTO PO) Take 320 mg by mouth every day.     • lisinopril (PRINIVIL) 40 MG tablet Take 1 Tab by mouth every day. 90 Tab 1     No current Epic-ordered facility-administered medications on file.        Allergies:  Patient has no known allergies.    Health Maintenance: up to date    ROS:  Gen: no fevers/chills, no changes in weight  Eyes: no changes in vision  ENT: no sore throat, no hearing loss, no bloody nose  Pulm: no sob, no cough  CV: no chest pain, no palpitations  GI: no nausea/vomiting, no diarrhea  : no dysuria  MSk: no myalgias  Skin: no rash  Neuro: no headaches, no numbness/tingling  Heme/Lymph: no easy bruising      Objective:       Exam:  /78 (BP Location: Left arm, Patient Position: Sitting, BP Cuff Size: Adult)   Pulse 70   Temp 36.6 °C (97.8 °F) (Temporal)   Resp 14   Ht 1.829 m (6')   Wt 97.5 kg (215 lb)   SpO2 95%   BMI 29.16 kg/m²  Body mass index is 29.16 kg/m².    Gen: Alert and oriented, No apparent distress.  Neck: Neck is supple without lymphadenopathy.  Lungs: Normal effort, CTA bilaterally, no wheezes, rhonchi, or rales  CV: Regular rate and rhythm. No murmurs, rubs, or gallops.  Ext: No clubbing, cyanosis, edema.    EKG Interpretation   Ordered and interpreted by Carolyne Sousa DO  Rhythm: sinus bradycardia   Rate: 58  Axis: normal   Ectopy: none    Conduction: incomplete RBBB  ST Segments:no acute change   T Waves: no acute change   Q Waves: none   Clinical Impression: No previous EKG for comparison.  Sinus bradycardia with incomplete RBBB.        Assessment & Plan:     65 y.o. male with the following -     1. Hypertension, unspecified type  Chronic, improved.  Baseline EKG today with RBBB and sinus bradycardia.  Given recent chest pain episode and intermittent left arm tingling/numbness, urgent cardiology referral given and ECHO ordered.  Continue current medications.  Will monitor  - EKG - Clinic Performed  - REFERRAL TO CARDIOLOGY General Cardiology MD  - EC-ECHOCARDIOGRAM COMPLETE W/O CONT; Future    2. Incomplete right bundle branch block (RBBB) determined by electrocardiography  New issue.  He had chest pain 8/2020 with no recurrent chest pain but has had some intermittent left arm numbness.  No current symptoms.  Urgent cardiology and ECHO ordered.  Follow-up and ER precautions given.  - REFERRAL TO CARDIOLOGY General Cardiology MD  - EC-ECHOCARDIOGRAM COMPLETE W/O CONT; Future    3. Other chest pain  New issue.  He had chest pain 8/2020 with no recurrent chest pain but has had some intermittent left arm numbness.  No current symptoms.  Urgent cardiology and ECHO ordered.  Follow-up and ER precautions given for chest pain, palpitations, SOB, numbness or weakness. Patient expressed understanding and agreement with plan.  - REFERRAL TO CARDIOLOGY General Cardiology MD  - EC-ECHOCARDIOGRAM COMPLETE W/O CONT; Future      Return in about 6 weeks (around 11/25/2020) for Medication review.    Please note that this dictation was created using voice recognition software. I have made every reasonable attempt to correct obvious errors, but I expect that there are errors of grammar and possibly content that I did not discover before finalizing the note.

## 2020-10-14 NOTE — ASSESSMENT & PLAN NOTE
Onset about 2018.  Home blood pressures since last visit are consistently between 104-140/70-80s.  HR in 50-60s.  No chest pain, palpitations or SOB.  No leg swelling.  States that he went to a wedding at the end of August 2020 and had some chest discomfort at that time.  No recurrence of symptoms.  States that his discomfort was any achy pain, lasted about 1 hour.  He had eaten a lot and had some alcohol, but no other known triggers.  He was doing minimal dancing at that time.  Pain was substernal and did not radiate.  No associated nausea, vomiting, tingling at that time, but gets rare left and right arm tingling.

## 2020-10-14 NOTE — ASSESSMENT & PLAN NOTE
States that he went to a wedding at the end of August 2020 and had some chest discomfort at that time.  No recurrence of symptoms.  States that his discomfort was any achy pain, lasted about 1 hour.  He had eaten a lot and had some alcohol, but no other known triggers.  He was doing minimal dancing at that time.  Pain was substernal and did not radiate.  No associated nausea, vomiting, tingling at that time, but gets rare left and right arm tingling.

## 2020-10-19 ENCOUNTER — HOSPITAL ENCOUNTER (OUTPATIENT)
Dept: CARDIOLOGY | Facility: MEDICAL CENTER | Age: 65
End: 2020-10-19
Attending: FAMILY MEDICINE
Payer: COMMERCIAL

## 2020-10-19 ENCOUNTER — TELEPHONE (OUTPATIENT)
Dept: CARDIOLOGY | Facility: MEDICAL CENTER | Age: 65
End: 2020-10-19

## 2020-10-19 DIAGNOSIS — R07.89 OTHER CHEST PAIN: ICD-10-CM

## 2020-10-19 DIAGNOSIS — I10 HYPERTENSION, UNSPECIFIED TYPE: ICD-10-CM

## 2020-10-19 DIAGNOSIS — I45.10 INCOMPLETE RIGHT BUNDLE BRANCH BLOCK (RBBB) DETERMINED BY ELECTROCARDIOGRAPHY: ICD-10-CM

## 2020-10-19 LAB
LV EJECT FRACT  99904: 65
LV EJECT FRACT MOD 2C 99903: 70.26
LV EJECT FRACT MOD 4C 99902: 77.97
LV EJECT FRACT MOD BP 99901: 74.45

## 2020-10-19 PROCEDURE — 93306 TTE W/DOPPLER COMPLETE: CPT

## 2020-10-19 PROCEDURE — 93306 TTE W/DOPPLER COMPLETE: CPT | Mod: 26 | Performed by: INTERNAL MEDICINE

## 2020-10-19 NOTE — TELEPHONE ENCOUNTER
Called patient. No cardiologist seen in the past or testing outside of [facility] to obtain.

## 2020-10-26 ENCOUNTER — OFFICE VISIT (OUTPATIENT)
Dept: CARDIOLOGY | Facility: MEDICAL CENTER | Age: 65
End: 2020-10-26
Payer: COMMERCIAL

## 2020-10-26 VITALS
WEIGHT: 215 LBS | BODY MASS INDEX: 29.12 KG/M2 | SYSTOLIC BLOOD PRESSURE: 130 MMHG | RESPIRATION RATE: 14 BRPM | OXYGEN SATURATION: 97 % | DIASTOLIC BLOOD PRESSURE: 74 MMHG | HEIGHT: 72 IN | HEART RATE: 60 BPM

## 2020-10-26 DIAGNOSIS — G47.33 OBSTRUCTIVE SLEEP APNEA: ICD-10-CM

## 2020-10-26 DIAGNOSIS — I45.10 INCOMPLETE RIGHT BUNDLE BRANCH BLOCK (RBBB) DETERMINED BY ELECTROCARDIOGRAPHY: ICD-10-CM

## 2020-10-26 DIAGNOSIS — I10 ESSENTIAL HYPERTENSION: ICD-10-CM

## 2020-10-26 PROCEDURE — 99243 OFF/OP CNSLTJ NEW/EST LOW 30: CPT | Performed by: INTERNAL MEDICINE

## 2020-10-26 ASSESSMENT — ENCOUNTER SYMPTOMS
COUGH: 0
BACK PAIN: 0
DIARRHEA: 1
FALLS: 0
FEVER: 0
DIAPHORESIS: 0
NIGHT SWEATS: 0
PALPITATIONS: 0
LIGHT-HEADEDNESS: 0
HEADACHES: 0
LOSS OF BALANCE: 0
HEARTBURN: 1
NEAR-SYNCOPE: 0
DIZZINESS: 0
WEAKNESS: 0
BLOATING: 0
IRREGULAR HEARTBEAT: 0
SORE THROAT: 0
MYALGIAS: 0
PARESTHESIAS: 0
PND: 0
WHEEZING: 0
SLEEP DISTURBANCES DUE TO BREATHING: 0
ORTHOPNEA: 0
SHORTNESS OF BREATH: 0
VOMITING: 0
FLANK PAIN: 0
SYNCOPE: 0
DOUBLE VISION: 0
DECREASED APPETITE: 0
BLURRED VISION: 0
CONSTIPATION: 0
EXCESSIVE DAYTIME SLEEPINESS: 0
DYSPNEA ON EXERTION: 0
NAUSEA: 0
NUMBNESS: 0

## 2020-10-26 NOTE — PROGRESS NOTES
Cardiology Initial Consultation Note    Date of note:    10/26/2020    Primary Care Provider: Carolyne Sousa D.O.  Referring Provider: Carolyne Sousa D.O.     Patient Name: Martin Chung   YOB: 1955  MRN:              [id number]    Chief Complaint   Patient presents with   • Hypertension     NP       History of Present Illness: Mr. Martin Chung is a 65 y.o. male whose current medical problems include HTN and FLO on CPAP for the past 2 years who is here for cardiac consultation for new onset incomplete RBBB.    Patient states that he had one episode of chest pain in 8/2020 after a wedding which he attributed to indigestion.  He went to see his PCP earlier this month and underwent an ECG which showed incomplete RBBB and was referred to cardiology.    In terms of physical activity, he does daily 7-minute workout every other day alternating with elliptical 30-35 minutes and has lost about 10 lbs.    Cardiovascular Risk Factors:  1. Smoking status: Never smoker  2. Type II Diabetes Mellitus: No   3. Hypertension: Yes  4. Dyslipidemia:    Cholesterol,Tot   Date Value Ref Range Status   09/21/2020 162 100 - 199 mg/dL Final     LDL   Date Value Ref Range Status   09/21/2020 89 <100 mg/dL Final     HDL   Date Value Ref Range Status   09/21/2020 40 >=40 mg/dL Final     Triglycerides   Date Value Ref Range Status   09/21/2020 163 (H) 0 - 149 mg/dL Final     5. Family history of early Coronary Artery Disease in a first degree relative (Male less than 55 years of age; Female less than 65 years of age): Denies  6.  Obesity and/or Metabolic Syndrome: BMI 29  7. Sedentary lifestyle: No    Review of Systems   Constitution: Negative for decreased appetite, diaphoresis, fever, malaise/fatigue and night sweats.   HENT: Negative for congestion and sore throat.    Eyes: Negative for blurred vision and double vision.   Cardiovascular: Negative for chest pain, cyanosis, dyspnea on exertion,  irregular heartbeat, leg swelling, near-syncope, orthopnea, palpitations, paroxysmal nocturnal dyspnea and syncope.   Respiratory: Negative for cough, shortness of breath, sleep disturbances due to breathing and wheezing.    Endocrine: Negative for cold intolerance and heat intolerance.   Musculoskeletal: Negative for back pain, falls and myalgias.   Gastrointestinal: Positive for diarrhea and heartburn. Negative for bloating, constipation, nausea and vomiting.   Genitourinary: Positive for frequency. Negative for dysuria and flank pain.   Neurological: Negative for excessive daytime sleepiness, dizziness, headaches, light-headedness, loss of balance, numbness, paresthesias and weakness.       No past medical history on file.      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     • fluoxetine (PROZAC) 40 MG capsule Take 1 Cap by mouth every day. 90 Cap 1   • albuterol (VENTOLIN HFA) 108 (90 Base) MCG/ACT Aero Soln inhalation aerosol Inhale 2 Puffs by mouth every four hours as needed. 8.5 g 1   • amLODIPine (NORVASC) 2.5 MG Tab Take 1 Tab by mouth every day. 90 Tab 1   • cetirizine (ZYRTEC ALLERGY) 10 MG Tab Take 10 mg by mouth every day.     • aspirin 81 MG tablet Take 81 mg by mouth every day.     • Multiple Vitamins-Minerals (MULTIVITAMIN ADULT PO) Take 1 Tab by mouth every day.     • Vitamin D, Ergocalciferol, 50 MCG (2000 UT) Cap Take 3 Tabs by mouth every day.     • DHA-EPA-Vitamin E (OMEGA-3 COMPLEX PO) Take 1 tablet by mouth every day.     • Saw Palmetto, Serenoa repens, (SAW PALMETTO PO) Take 320 mg by mouth every day.     • lisinopril (PRINIVIL) 40 MG tablet Take 1 Tab by mouth every day. 90 Tab 1   • Peak Flow Meter ([employer] PEAK FLOW METER) Device 1 Units by Does not apply route Once.     • Spacer/Aero-Holding  Shaheed ([facility] Z-STAT PLUS/LARGE) Misc 1 Application by Does not apply route Once.       No current facility-administered medications for this visit.          No Known Allergies      Family History   Problem Relation Age of Onset   • Cancer Mother         breast cancer   • Hypertension Mother    • Stroke Father    • Cancer Other         prostate cancer   • Psychiatric Illness Other         depression   • Migraines Other    • Diabetes Neg Hx    • Hyperlipidemia Neg Hx          Social History     Socioeconomic History   • Marital status:      Spouse name: Not on file   • Number of children: Not on file   • Years of education: Not on file   • Highest education level: Bachelor's degree (e.g., BA, AB, BS)   Occupational History   • Not on file   Social Needs   • Financial resource strain: Not hard at all   • Food insecurity     Worry: Never true     Inability: Never true   • Transportation needs     Medical: No     Non-medical: No   Tobacco Use   • Smoking status: Never Smoker   • Smokeless tobacco: Never Used   Substance and Sexual Activity   • Alcohol use: Yes     Frequency: 4 or more times a week     Drinks per session: 1 or 2     Binge frequency: Never   • Drug use: Not Currently   • Sexual activity: Yes     Partners: Female   Lifestyle   • Physical activity     Days per week: 4 days     Minutes per session: 40 min   • Stress: Not at all   Relationships   • Social connections     Talks on phone: More than three times a week     Gets together: More than three times a week     Attends Worship service: More than 4 times per year     Active member of club or organization: Yes     Attends meetings of clubs or organizations: More than 4 times per year     Relationship status:    • Intimate partner violence     Fear of current or ex partner: Not on file     Emotionally abused: Not on file     Physically abused: Not on file     Forced sexual activity: Not on file   Other Topics Concern   • Not on file     Social History Narrative   • Not on file         Physical Exam:  Ambulatory Vitals  /74 (BP Location: Left arm, Patient Position: Sitting, BP Cuff Size: Adult)   Pulse 60   Resp 14   Ht 1.829 m (6')   Wt 97.5 kg (215 lb)   SpO2 97%    Oxygen Therapy:  Pulse Oximetry: 97 %  BP Readings from Last 4 Encounters:   10/26/20 130/74   10/14/20 128/78   09/21/20 130/76   09/16/20 134/84       Weight/BMI: Body mass index is 29.16 kg/m².  Wt Readings from Last 4 Encounters:   10/26/20 97.5 kg (215 lb)   10/14/20 97.5 kg (215 lb)   09/21/20 94.8 kg (209 lb)   09/16/20 97.1 kg (214 lb)         General: Well appearing and in no apparent distress  Eyes: nl conjunctiva, no icteric sclera  ENT: wearing a mask, normal external appearance of ears  Neck: no visible JVP,  no carotid bruits  Lungs: normal respiratory effort, CTAB  Heart: RRR, no murmurs, no rubs or gallops,  no edema bilateral lower extremities. No LV/RV heave on cardiac palpatation. 2+ bilateral radial pulses.  2+ bilateral dp pulses.   Abdomen: soft, non tender, non distended, no masses, normal bowel sounds.  No HSM.  Extremities/MSK: no clubbing, no cyanosis  Neurological: No focal sensory deficits  Psychiatric: Appropriate affect, A/O x 3, intact judgement and insight  Skin: Warm extremities      Lab Data Review:  Lab Results   Component Value Date/Time    CHOLSTRLTOT 162 09/21/2020 10:48 AM    LDL 89 09/21/2020 10:48 AM    HDL 40 09/21/2020 10:48 AM    TRIGLYCERIDE 163 (H) 09/21/2020 10:48 AM       Lab Results   Component Value Date/Time    SODIUM 138 09/21/2020 10:48 AM    POTASSIUM 4.2 09/21/2020 10:48 AM    CHLORIDE 101 09/21/2020 10:48 AM    CO2 26 09/21/2020 10:48 AM    GLUCOSE 108 (H) 09/21/2020 10:48 AM    BUN 10 09/21/2020 10:48 AM    CREATININE 0.80 09/21/2020 10:48 AM     Lab Results   Component Value Date/Time    ALKPHOSPHAT 81 09/21/2020 10:48 AM    ASTSGOT 21 09/21/2020 10:48 AM    ALTSGPT 24 09/21/2020 10:48 AM    TBILIRUBIN 0.6  09/21/2020 10:48 AM      No results found for: WBC  No results found for: HBA1C      Cardiac Imaging and Procedures Review:    EKG dated 10/2020: My personal interpretation is normal sinus rhythm with incomplete RBBB     Echo dated 10/19/20:   My personal interpretation is normal LV size and function with EF 65%, no RWMA.    Radiology test Review:  US Venous bilateral lower extremity: CONCLUSIONS   Right lower extremity - No evidence of superficial or deep venous thrombosis.  Deep venous reflux demonstrated in the common femoral vein.  Superficial venous reflux demonstrated in the proximal thigh greater saphenous vein.         Assessment & Plan     1. Incomplete right bundle branch block (RBBB) determined by electrocardiography     2. Essential hypertension     3. Obstructive sleep apnea           Shared Medical Decision Making:  Incidental finding of incomplete right bundle branch block on the ECG with normal echocardiogram and no concerning symptoms.    Continue current anti-hypertensive regimen; excellent blood pressure control.    Continue CPAP at night.    Discussed ACC/AHA guidelines of cardio focused exercise with reaching at least 85% of your maximum predicted heart rate for a minimum of 150 minutes/week to maintain healthy weight and reduce cardiovascular risk factors for atherosclerotic heart disease.      All of patient's excellent questions were answered to the best of my knowledge and to his satisfaction.  It was a pleasure seeing Mr. Martin Chung in my clinic today. Return in about 1 year (around 10/26/2021). Patient is aware to call the cardiology clinic with any questions or concerns.      Alex Rodas MD  [facility]  [facility], [address]  [address] [address]  Phone: [phone]  Fax: [phone]

## 2020-11-25 ENCOUNTER — NON-PROVIDER VISIT (OUTPATIENT)
Dept: MEDICAL GROUP | Facility: MEDICAL CENTER | Age: 65
End: 2020-11-25
Payer: COMMERCIAL

## 2020-11-25 DIAGNOSIS — Z23 NEED FOR VACCINATION: ICD-10-CM

## 2020-11-25 PROCEDURE — 99999 PR NO CHARGE: CPT | Performed by: PHYSICIAN ASSISTANT

## 2020-11-25 PROCEDURE — 90715 TDAP VACCINE 7 YRS/> IM: CPT | Performed by: INTERNAL MEDICINE

## 2020-11-25 PROCEDURE — 90471 IMMUNIZATION ADMIN: CPT | Performed by: INTERNAL MEDICINE

## 2020-11-25 NOTE — PROGRESS NOTES
"Chucky Chung III is a 65 y.o. male here for a non-provider visit for:   TDAP    Reason for immunization: Overdue/Provider Recommended  Immunization records indicate need for vaccine: Yes, confirmed with Epic  Minimum interval has been met for this vaccine: Yes  ABN completed: Not Indicated    Order and dose verified by: RB  VIS Dated  4/1/20 was given to patient: Yes  All IAC Questionnaire questions were answered \"No.\"    Patient tolerated injection and no adverse effects were observed or reported: Yes    Pt scheduled for next dose in series: Not Indicated  "

## 2020-12-20 DIAGNOSIS — I10 HYPERTENSION, UNSPECIFIED TYPE: ICD-10-CM

## 2020-12-21 RX ORDER — AMLODIPINE BESYLATE 2.5 MG/1
TABLET ORAL
Qty: 90 TAB | Refills: 0 | Status: SHIPPED | OUTPATIENT
Start: 2020-12-21 | End: 2021-01-04

## 2021-01-03 DIAGNOSIS — F42.9 OBSESSIVE-COMPULSIVE DISORDER, UNSPECIFIED TYPE: ICD-10-CM

## 2021-01-03 DIAGNOSIS — F90.2 ATTENTION DEFICIT HYPERACTIVITY DISORDER (ADHD), COMBINED TYPE: ICD-10-CM

## 2021-01-03 DIAGNOSIS — I10 HYPERTENSION, UNSPECIFIED TYPE: ICD-10-CM

## 2021-01-04 RX ORDER — FLUOXETINE HYDROCHLORIDE 40 MG/1
CAPSULE ORAL
Qty: 90 CAP | Refills: 1 | Status: SHIPPED | OUTPATIENT
Start: 2021-01-04 | End: 2021-06-28

## 2021-01-04 RX ORDER — AMLODIPINE BESYLATE 2.5 MG/1
TABLET ORAL
Qty: 90 TAB | Refills: 0 | Status: SHIPPED | OUTPATIENT
Start: 2021-01-04 | End: 2021-04-01

## 2021-02-13 DIAGNOSIS — I10 HYPERTENSION, UNSPECIFIED TYPE: ICD-10-CM

## 2021-02-15 RX ORDER — LISINOPRIL 40 MG/1
TABLET ORAL
Qty: 90 TABLET | Refills: 2 | Status: SHIPPED | OUTPATIENT
Start: 2021-02-15 | End: 2021-07-01 | Stop reason: SDUPTHER

## 2021-03-31 DIAGNOSIS — I10 HYPERTENSION, UNSPECIFIED TYPE: ICD-10-CM

## 2021-04-01 RX ORDER — AMLODIPINE BESYLATE 2.5 MG/1
TABLET ORAL
Qty: 90 TABLET | Refills: 0 | Status: SHIPPED | OUTPATIENT
Start: 2021-04-01 | End: 2021-06-28

## 2021-04-07 ENCOUNTER — IMMUNIZATION (OUTPATIENT)
Dept: FAMILY PLANNING/WOMEN'S HEALTH CLINIC | Facility: IMMUNIZATION CENTER | Age: 66
End: 2021-04-07
Payer: COMMERCIAL

## 2021-04-07 DIAGNOSIS — Z23 ENCOUNTER FOR VACCINATION: Primary | ICD-10-CM

## 2021-04-07 PROCEDURE — 0001A PFIZER SARS-COV-2 VACCINE: CPT | Performed by: INTERNAL MEDICINE

## 2021-04-07 PROCEDURE — 91300 PFIZER SARS-COV-2 VACCINE: CPT | Performed by: INTERNAL MEDICINE

## 2021-04-14 ENCOUNTER — OFFICE VISIT (OUTPATIENT)
Dept: MEDICAL GROUP | Facility: MEDICAL CENTER | Age: 66
End: 2021-04-14
Payer: COMMERCIAL

## 2021-04-14 VITALS
HEIGHT: 72 IN | HEART RATE: 73 BPM | RESPIRATION RATE: 12 BRPM | WEIGHT: 218 LBS | DIASTOLIC BLOOD PRESSURE: 70 MMHG | OXYGEN SATURATION: 99 % | TEMPERATURE: 97.2 F | SYSTOLIC BLOOD PRESSURE: 132 MMHG | BODY MASS INDEX: 29.53 KG/M2

## 2021-04-14 DIAGNOSIS — D22.9 NEVUS: ICD-10-CM

## 2021-04-14 DIAGNOSIS — J45.909 MILD ASTHMA WITHOUT COMPLICATION, UNSPECIFIED WHETHER PERSISTENT: ICD-10-CM

## 2021-04-14 DIAGNOSIS — I10 ESSENTIAL HYPERTENSION: ICD-10-CM

## 2021-04-14 DIAGNOSIS — L57.0 AK (ACTINIC KERATOSIS): ICD-10-CM

## 2021-04-14 DIAGNOSIS — G47.33 OBSTRUCTIVE SLEEP APNEA: ICD-10-CM

## 2021-04-14 PROCEDURE — 17003 DESTRUCT PREMALG LES 2-14: CPT | Performed by: FAMILY MEDICINE

## 2021-04-14 PROCEDURE — 17000 DESTRUCT PREMALG LESION: CPT | Performed by: FAMILY MEDICINE

## 2021-04-14 PROCEDURE — 99214 OFFICE O/P EST MOD 30 MIN: CPT | Mod: 25 | Performed by: FAMILY MEDICINE

## 2021-04-14 RX ORDER — ALBUTEROL SULFATE 90 UG/1
2 AEROSOL, METERED RESPIRATORY (INHALATION) EVERY 4 HOURS PRN
Qty: 8.5 G | Refills: 1 | Status: SHIPPED | OUTPATIENT
Start: 2021-04-14 | End: 2021-10-11 | Stop reason: SDUPTHER

## 2021-04-14 ASSESSMENT — PATIENT HEALTH QUESTIONNAIRE - PHQ9: CLINICAL INTERPRETATION OF PHQ2 SCORE: 0

## 2021-04-14 NOTE — PROGRESS NOTES
Subjective:     CC: skin cancer check    HPI:   Martin presents today with     Asthma  Chronic issue, only has flares with exercise.  He exercises 5x/week but states it never gets to the point of needing his inhaler.    Nevus  Pt has 2 lesions on his right arm that have been progressively increasing in size and are pearly and scaly appearing.  He has hx of squamous cell carcinoma and would like dermatology referral for biopsy.    AK (actinic keratosis)  Pt has a couple abnormal scaly lesions on his bilateral arms that he would like removed.      History reviewed. No pertinent past medical history.    Social History     Tobacco Use   • Smoking status: Never Smoker   • Smokeless tobacco: Never Used   Substance Use Topics   • Alcohol use: Yes   • Drug use: Not Currently       Current Outpatient Medications Ordered in Epic   Medication Sig Dispense Refill   • albuterol (VENTOLIN HFA) 108 (90 Base) MCG/ACT Aero Soln inhalation aerosol Inhale 2 Puffs every four hours as needed. 8.5 g 1   • amLODIPine (NORVASC) 2.5 MG Tab TAKE ONE TABLET BY MOUTH EVERY DAY 90 tablet 0   • lisinopril (PRINIVIL) 40 MG tablet TAKE ONE TABLET BY MOUTH EVERY DAY 90 tablet 2   • fluoxetine (PROZAC) 40 MG capsule TAKE ONE CAPSULE BY MOUTH EVERY DAY 90 Cap 1   • Loperamide HCl (IMODIUM PO) Take  by mouth.     • cetirizine (ZYRTEC ALLERGY) 10 MG Tab Take 10 mg by mouth every day.     • Peak Flow Meter ([employer] PEAK FLOW METER) Device 1 Units by Does not apply route Once.     • Spacer/Aero-Holding Chambers ([employer] Z-STAT PLUS/LARGE) Misc 1 Application by Does not apply route Once.     • aspirin 81 MG tablet Take 81 mg by mouth every day.     • Multiple Vitamins-Minerals (MULTIVITAMIN ADULT PO) Take 1 Tab by mouth every day.     • Vitamin D, Ergocalciferol, 50 MCG (2000 UT) Cap Take 3 Tabs by mouth every day.     • DHA-EPA-Vitamin E (OMEGA-3 COMPLEX PO) Take 1 tablet by mouth every day.     • Saw Palmetto, Serenoa repens, (SAW PALMETTO PO) Take  320 mg by mouth every day.       No current [facility]-ordered facility-administered medications on file.       Allergies:  Patient has no known allergies.    Health Maintenance: Pt received his first COVID19 vaccine last week    ROS:  Gen: no fevers/chills, no changes in weight  Eyes: no changes in vision  ENT: no sore throat, no hearing loss, no bloody nose  Pulm: no sob, no cough  CV: no chest pain, no palpitations  GI: no nausea/vomiting, no diarrhea  : no dysuria  MSk: no myalgias  Skin: per hpi  Neuro: no headaches, no numbness/tingling  Heme/Lymph: no easy bruising      Objective:       Exam:  /70 (BP Location: Left arm, Patient Position: Sitting, BP Cuff Size: Adult)   Pulse 73   Temp 36.2 °C (97.2 °F) (Temporal)   Resp 12   Ht 1.829 m (6')   Wt 98.9 kg (218 lb)   SpO2 99%   BMI 29.57 kg/m²  Body mass index is 29.57 kg/m².    Gen: Alert and oriented, No apparent distress.  Neck: Neck is supple without lymphadenopathy.  Lungs: Normal effort, CTA bilaterally, no wheezes, rhonchi, or rales  CV: Regular rate and rhythm. No murmurs, rubs, or gallops.  Ext: No clubbing, cyanosis, edema.  Skin: Left arm with 4 scaly lesions consistent with AKs, Right arm with 2 scaly lesions consistent with AKs and 2 abnormal nevi that are pearly appearing with scaling within.  Left leg with 4 scaly lesions consistent with AKs.  Right leg with 3 scaly lesions consistent with AKs      PROCEDURE: CRYOTHERAPY  Discussed risks and benefits of cryotherapy including but not limited to scarring, hyperpigmentation, hypopigmentation, hypertrophic scarring, keiloid scarring, incomplete or no resolution of lesions treated,pain, undesirable cosemetic result, blistering, potential need for additional treatment including more invasive treatment. Patient expresses understanding and verbally acknowledges risks and consent to treatment. 3 applications of cryotherapy with 3 second freeze thaw cycle was applied to  13 AK's (as mentioned in  location above). Patient tolerated procedure well. There were no complications. Aftercare instructions given.    A chaperone was offered to the patient during today's exam. Chaperone name: Kaitlyn Urban was present.  During his cryotherapy treatment      Assessment & Plan:     65 y.o. male with the following -     1. Mild asthma without complication, unspecified whether persistent  Chronic issue, stable, well controlled.  Refilled albuterol for prn use for flares.  Follow-up precautions given.  - albuterol (VENTOLIN HFA) 108 (90 Base) MCG/ACT Aero Soln inhalation aerosol; Inhale 2 Puffs every four hours as needed.  Dispense: 8.5 g; Refill: 1    2. Nevus  New issue with concerning lesions x 2 noted on his right arm that are visibly consistent with possible squamous cell carcinoma.  Urgent dermatology referral for biopsy given.  Encouraged sunscreen use.  - REFERRAL TO DERMATOLOGY    3. AK (actinic keratosis)  Newly noted lesions.  Cryotherapy offered and pt agreed to treatment today in office.  13 lesions were treated today.  After care and follow-up instructions given. Patient expressed understanding and agreement with plan.    4. Essential hypertension  Chronic, stable, well controlled.  Continue current medications. Will monitor with plans for repeat labs to be ordered at next visit.    5. Obstructive sleep apnea  Chronic issue, improved symptomatically with CPAP.  Continue CPAP and will monitor.      Return in about 6 months (around 10/14/2021) for Annual.    Please note that this dictation was created using voice recognition software. I have made every reasonable attempt to correct obvious errors, but I expect that there are errors of grammar and possibly content that I did not discover before finalizing the note.

## 2021-04-14 NOTE — ASSESSMENT & PLAN NOTE
Chronic issue, only has flares with exercise.  He exercises 5x/week but states it never gets to the point of needing his inhaler.

## 2021-04-14 NOTE — ASSESSMENT & PLAN NOTE
Pt has 2 lesions on his right arm that have been progressively increasing in size and are pearly and scaly appearing.  He has hx of squamous cell carcinoma and would like dermatology referral for biopsy.

## 2021-04-15 ENCOUNTER — OFFICE VISIT (OUTPATIENT)
Dept: DERMATOLOGY | Facility: IMAGING CENTER | Age: 66
End: 2021-04-15
Payer: COMMERCIAL

## 2021-04-15 DIAGNOSIS — L85.3 XEROSIS OF SKIN: ICD-10-CM

## 2021-04-15 DIAGNOSIS — L81.4 LENTIGO: ICD-10-CM

## 2021-04-15 DIAGNOSIS — L82.1 SEBORRHEIC KERATOSIS: ICD-10-CM

## 2021-04-15 DIAGNOSIS — L90.8 SKIN AGING: ICD-10-CM

## 2021-04-15 DIAGNOSIS — L57.0 ACTINIC KERATOSIS: ICD-10-CM

## 2021-04-15 DIAGNOSIS — Z12.83 SKIN CANCER SCREENING: ICD-10-CM

## 2021-04-15 DIAGNOSIS — D22.9 NEVUS: ICD-10-CM

## 2021-04-15 PROCEDURE — 99203 OFFICE O/P NEW LOW 30 MIN: CPT | Mod: 25 | Performed by: DERMATOLOGY

## 2021-04-15 PROCEDURE — 17003 DESTRUCT PREMALG LES 2-14: CPT | Performed by: DERMATOLOGY

## 2021-04-15 PROCEDURE — 17000 DESTRUCT PREMALG LESION: CPT | Performed by: DERMATOLOGY

## 2021-04-15 NOTE — PROGRESS NOTES
CC: shraan     Subjective: New patient here for skin check, couple concerning lesions    HPI/location: Rt forearm   Time present: last year  Painful lesion: No  Itching lesion: No  Enlarging lesion: No  Anything make it better or worse?    HPI/location: Lt arm moles   Time present: lifetime   Painful lesion: No  Itching lesion: No  Enlarging lesion: No  Anything make it better or worse?    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 No itch.  No cough    Relevant PMH: mult med comorbidities  Social: NS    PE: Gen:WDWN male in NAD. Skin: Scalp/face/eyes/lips/oral mucosa/conjunctivae/neck/chest/back/arms/legs/hands/feet/buttocks - without suspicious lesions noted.  Genitals exam declined  -scar left facial cheek, well healed  -scattered bullae with overlying thin HK papules on arms, legs  -3 HK papules on r, l-2 arms   -scattered hyperpigmented macules/papules, appearing benign on torso and extremities  -xerosis  -no neck LAD palpated    A/P: AKs: arms  -counseled on diagnosis and treatment, including risks/benefits/alternatives of cyrotherapy  -LN2 25 sec X 2 cycles X 3lesions  -f/u if persists at 1 month  -site on right arm with long freeze/thaw - if persists, recurs, consider shave bx    Nevi: benign appearing:  -RTC PRN growth/changes/concerning features    Lentigos/SKs: benign  -reassurance  -RTC PRN growth/changes/concerning features    Xerosis, diffuse:  -no trx reviewed today  -moisturizer use    Hx of skin cancer: NER  -cont'd sunprotection and skin cancer surveillance  -Q 6mo-annual exam recommended; f/u suspicious lesions PRN    F/u 1  year/PRN    I have reviewed medications relevant to my specialty.

## 2021-04-29 ENCOUNTER — IMMUNIZATION (OUTPATIENT)
Dept: FAMILY PLANNING/WOMEN'S HEALTH CLINIC | Facility: IMMUNIZATION CENTER | Age: 66
End: 2021-04-29
Payer: COMMERCIAL

## 2021-04-29 DIAGNOSIS — Z23 ENCOUNTER FOR VACCINATION: Primary | ICD-10-CM

## 2021-04-29 PROCEDURE — 91300 PFIZER SARS-COV-2 VACCINE: CPT

## 2021-04-29 PROCEDURE — 0002A PFIZER SARS-COV-2 VACCINE: CPT

## 2021-06-27 DIAGNOSIS — I10 HYPERTENSION, UNSPECIFIED TYPE: ICD-10-CM

## 2021-06-27 DIAGNOSIS — F90.2 ATTENTION DEFICIT HYPERACTIVITY DISORDER (ADHD), COMBINED TYPE: ICD-10-CM

## 2021-06-27 DIAGNOSIS — F42.9 OBSESSIVE-COMPULSIVE DISORDER, UNSPECIFIED TYPE: ICD-10-CM

## 2021-06-28 RX ORDER — AMLODIPINE BESYLATE 2.5 MG/1
TABLET ORAL
Qty: 90 TABLET | Refills: 0 | Status: SHIPPED | OUTPATIENT
Start: 2021-06-28 | End: 2021-07-01 | Stop reason: SDUPTHER

## 2021-06-28 RX ORDER — FLUOXETINE HYDROCHLORIDE 40 MG/1
CAPSULE ORAL
Qty: 90 CAPSULE | Refills: 1 | Status: SHIPPED | OUTPATIENT
Start: 2021-06-28 | End: 2021-07-01 | Stop reason: SDUPTHER

## 2021-07-01 ENCOUNTER — PATIENT MESSAGE (OUTPATIENT)
Dept: HEALTH INFORMATION MANAGEMENT | Facility: OTHER | Age: 66
End: 2021-07-01

## 2021-07-01 ENCOUNTER — PATIENT OUTREACH (OUTPATIENT)
Dept: HEALTH INFORMATION MANAGEMENT | Facility: OTHER | Age: 66
End: 2021-07-01

## 2021-07-01 DIAGNOSIS — F42.9 OBSESSIVE-COMPULSIVE DISORDER, UNSPECIFIED TYPE: ICD-10-CM

## 2021-07-01 DIAGNOSIS — F90.2 ATTENTION DEFICIT HYPERACTIVITY DISORDER (ADHD), COMBINED TYPE: ICD-10-CM

## 2021-07-01 DIAGNOSIS — I10 HYPERTENSION, UNSPECIFIED TYPE: ICD-10-CM

## 2021-07-01 NOTE — PROGRESS NOTES
Outcome: Declined  Comprehensive Health Assessment    Attempt # 1      Welcomed member to [facility]. Introduced [facility]  program. Shared contact information through my chart. Updated Epic for PCP. Message went to PCP regarding refills for new pharmacy. Declined Comprehensive Health Assessment at this time.

## 2021-07-01 NOTE — TELEPHONE ENCOUNTER
Received request via: Patient    Was the patient seen in the last year in this department? Yes    Does the patient have an active prescription (recently filled or refills available) for medication(s) requested? No     Changing pharmacy

## 2021-07-02 RX ORDER — LISINOPRIL 40 MG/1
40 TABLET ORAL
Qty: 90 TABLET | Refills: 2 | Status: SHIPPED | OUTPATIENT
Start: 2021-07-02 | End: 2021-10-11 | Stop reason: SDUPTHER

## 2021-07-02 RX ORDER — AMLODIPINE BESYLATE 2.5 MG/1
2.5 TABLET ORAL
Qty: 90 TABLET | Refills: 1 | Status: SHIPPED | OUTPATIENT
Start: 2021-07-02 | End: 2021-10-11 | Stop reason: SDUPTHER

## 2021-07-02 RX ORDER — FLUOXETINE HYDROCHLORIDE 40 MG/1
40 CAPSULE ORAL
Qty: 90 CAPSULE | Refills: 1 | Status: SHIPPED | OUTPATIENT
Start: 2021-07-02 | End: 2021-10-11 | Stop reason: SDUPTHER

## 2021-09-21 SDOH — ECONOMIC STABILITY: FOOD INSECURITY: WITHIN THE PAST 12 MONTHS, YOU WORRIED THAT YOUR FOOD WOULD RUN OUT BEFORE YOU GOT MONEY TO BUY MORE.: NEVER TRUE

## 2021-09-21 SDOH — HEALTH STABILITY: PHYSICAL HEALTH: ON AVERAGE, HOW MANY DAYS PER WEEK DO YOU ENGAGE IN MODERATE TO STRENUOUS EXERCISE (LIKE A BRISK WALK)?: 7 DAYS

## 2021-09-21 SDOH — HEALTH STABILITY: PHYSICAL HEALTH: ON AVERAGE, HOW MANY MINUTES DO YOU ENGAGE IN EXERCISE AT THIS LEVEL?: 60 MIN

## 2021-09-21 SDOH — ECONOMIC STABILITY: INCOME INSECURITY: IN THE LAST 12 MONTHS, WAS THERE A TIME WHEN YOU WERE NOT ABLE TO PAY THE MORTGAGE OR RENT ON TIME?: NO

## 2021-09-21 SDOH — ECONOMIC STABILITY: HOUSING INSECURITY: IN THE LAST 12 MONTHS, HOW MANY PLACES HAVE YOU LIVED?: 1

## 2021-09-21 SDOH — ECONOMIC STABILITY: INCOME INSECURITY: HOW HARD IS IT FOR YOU TO PAY FOR THE VERY BASICS LIKE FOOD, HOUSING, MEDICAL CARE, AND HEATING?: NOT HARD AT ALL

## 2021-09-21 SDOH — ECONOMIC STABILITY: FOOD INSECURITY: WITHIN THE PAST 12 MONTHS, THE FOOD YOU BOUGHT JUST DIDN'T LAST AND YOU DIDN'T HAVE MONEY TO GET MORE.: NEVER TRUE

## 2021-09-21 ASSESSMENT — SOCIAL DETERMINANTS OF HEALTH (SDOH)
IN A TYPICAL WEEK, HOW MANY TIMES DO YOU TALK ON THE PHONE WITH FAMILY, FRIENDS, OR NEIGHBORS?: MORE THAN THREE TIMES A WEEK
HOW OFTEN DO YOU ATTENT MEETINGS OF THE CLUB OR ORGANIZATION YOU BELONG TO?: MORE THAN 4 TIMES PER YEAR
HOW OFTEN DO YOU ATTEND CHURCH OR RELIGIOUS SERVICES?: MORE THAN 4 TIMES PER YEAR
DO YOU BELONG TO ANY CLUBS OR ORGANIZATIONS SUCH AS CHURCH GROUPS UNIONS, FRATERNAL OR ATHLETIC GROUPS, OR SCHOOL GROUPS?: YES
HOW OFTEN DO YOU GET TOGETHER WITH FRIENDS OR RELATIVES?: MORE THAN THREE TIMES A WEEK

## 2021-09-21 ASSESSMENT — LIFESTYLE VARIABLES
HOW OFTEN DO YOU HAVE A DRINK CONTAINING ALCOHOL: 4 OR MORE TIMES A WEEK
HOW OFTEN DO YOU HAVE SIX OR MORE DRINKS ON ONE OCCASION: NEVER
HOW MANY STANDARD DRINKS CONTAINING ALCOHOL DO YOU HAVE ON A TYPICAL DAY: 1 OR 2

## 2021-09-22 SDOH — HEALTH STABILITY: PHYSICAL HEALTH: ON AVERAGE, HOW MANY DAYS PER WEEK DO YOU ENGAGE IN MODERATE TO STRENUOUS EXERCISE (LIKE A BRISK WALK)?: 7 DAYS

## 2021-09-22 SDOH — HEALTH STABILITY: PHYSICAL HEALTH: ON AVERAGE, HOW MANY MINUTES DO YOU ENGAGE IN EXERCISE AT THIS LEVEL?: 60 MIN

## 2021-09-22 SDOH — ECONOMIC STABILITY: HOUSING INSECURITY: IN THE LAST 12 MONTHS, HOW MANY PLACES HAVE YOU LIVED?: 1

## 2021-09-22 ASSESSMENT — SOCIAL DETERMINANTS OF HEALTH (SDOH)
DO YOU BELONG TO ANY CLUBS OR ORGANIZATIONS SUCH AS CHURCH GROUPS UNIONS, FRATERNAL OR ATHLETIC GROUPS, OR SCHOOL GROUPS?: YES
HOW HARD IS IT FOR YOU TO PAY FOR THE VERY BASICS LIKE FOOD, HOUSING, MEDICAL CARE, AND HEATING?: NOT HARD AT ALL
HOW OFTEN DO YOU ATTEND CHURCH OR RELIGIOUS SERVICES?: MORE THAN 4 TIMES PER YEAR
WITHIN THE PAST 12 MONTHS, YOU WORRIED THAT YOUR FOOD WOULD RUN OUT BEFORE YOU GOT THE MONEY TO BUY MORE: NEVER TRUE
HOW MANY DRINKS CONTAINING ALCOHOL DO YOU HAVE ON A TYPICAL DAY WHEN YOU ARE DRINKING: 1 OR 2
IN A TYPICAL WEEK, HOW MANY TIMES DO YOU TALK ON THE PHONE WITH FAMILY, FRIENDS, OR NEIGHBORS?: MORE THAN THREE TIMES A WEEK
HOW OFTEN DO YOU HAVE SIX OR MORE DRINKS ON ONE OCCASION: NEVER
HOW OFTEN DO YOU ATTENT MEETINGS OF THE CLUB OR ORGANIZATION YOU BELONG TO?: MORE THAN 4 TIMES PER YEAR
HOW OFTEN DO YOU GET TOGETHER WITH FRIENDS OR RELATIVES?: MORE THAN THREE TIMES A WEEK
HOW OFTEN DO YOU HAVE A DRINK CONTAINING ALCOHOL: 4 OR MORE TIMES A WEEK

## 2021-09-24 ENCOUNTER — TELEPHONE (OUTPATIENT)
Dept: HEALTH INFORMATION MANAGEMENT | Facility: OTHER | Age: 66
End: 2021-09-24

## 2021-09-24 NOTE — TELEPHONE ENCOUNTER
HIPAA verified - Member called for NICKOLAS Mitchell. Stated he was returning her call. Warm transferred to Paula. All was understood.     -aep

## 2021-10-10 NOTE — PROGRESS NOTES
Subjective:     CC: No chief complaint on file.      HPI:   Martin Chung III is a 66 y.o. male who presents for an annual exam. He is feeling well and has no complaints.    Health Maintenance  Advanced directive: *** {age >=65}  PT/vit D for falls prevention: ordered  Cholesterol Screening: ordered  Diabetes Screening: ordered  AAA Screening: never smoked  Aspirin Use: ***  {50-59 for the primary prevention of cardiovascular disease (CVD) and colorectal cancer (CRC) if: 10% or greater 10-year CVD risk, not at increased risk for bleeding, life expectancy >10 years, and willing to take low-dose aspirin daily for 10 years. USPSTF: B}  Diet: *** {BMI ?25 diet & physical activity counseling, BMI ?30 offer or referral for intervention. USPSTF: B}  Exercise: *** {BMI ?25 diet & physical activity counseling, BMI ?30 offer or referral for intervention. USPSTF: B}  Substance Abuse: *** {counseling if alcohol misuse. USPSTF: B}  Safe in relationship.   Seat belts, bike helmet, gun safety discussed.  Sun protection used.    Cancer screening  Colorectal Cancer Screening: ***  {50-75 yearly FIT testing, colonoscopy every 10 years, or flex sig every 5 years. USPSTF: A. 45-75 if /. PN Policy}  Lung Cancer Screening: ***  {55-80 yearly screen with 30 pack-years, currently smoke or quit <15 years. USPSTF: B}  Prostate Cancer Screening/PSA: *** {discuss, age 55-69. Maybe every 2 years. https://www.cancer.gov/types/prostate/patient/prostate-screening-pdq}    Infectious disease screening/Immunizations  --STI Screening: ***  --Practices safe sex.  --Hepatitis C Screening: completed 9/21/2020  --Immunizations:    Influenza: recommended   Tetanus:7/1/2015   Shingles: completed   Pneumococcal : next due 7/1/2025   Other immunizations: COVID19 completed.  Recommended booster 6 months after his last dose (10/29/2021)    He  has no past medical history on file.  He  has a past surgical history that  includes other.  Family History   Problem Relation Age of Onset   • Cancer Mother         breast cancer   • Hypertension Mother    • Stroke Father    • Cancer Other         prostate cancer   • Psychiatric Illness Other         depression   • Migraines Other    • Diabetes Neg Hx    • Hyperlipidemia Neg Hx      Social History     Tobacco Use   • Smoking status: Never Smoker   • Smokeless tobacco: Never Used   Vaping Use   • Vaping Use: Never used   Substance Use Topics   • Alcohol use: Yes   • Drug use: Not Currently       Patient Active Problem List    Diagnosis Date Noted   • Nevus 04/14/2021   • Incomplete right bundle branch block (RBBB) determined by electrocardiography 10/14/2020   • Other chest pain 10/14/2020   • Other irritable bowel syndrome 07/08/2020   • AK (actinic keratosis) 06/30/2020   • History of squamous cell carcinoma of skin 06/24/2020   • Overweight 06/24/2020   • Skin lump of leg, right 06/24/2020   • Asthma 02/24/2020   • Obstructive sleep apnea 02/06/2019   • HTN (hypertension) 01/24/2019   • Attention deficit hyperactivity disorder (ADHD), combined type 07/31/2012   • Non-toxic multinodular goiter 04/03/2008   • Obsessive compulsive disorder 04/03/2008       Current Outpatient Medications   Medication Sig Dispense Refill   • amLODIPine (NORVASC) 2.5 MG Tab Take 1 tablet by mouth every day. 90 tablet 1   • fluoxetine (PROZAC) 40 MG capsule Take 1 capsule by mouth every day. 90 capsule 1   • lisinopril (PRINIVIL) 40 MG tablet Take 1 tablet by mouth every day. 90 tablet 2   • albuterol (VENTOLIN HFA) 108 (90 Base) MCG/ACT Aero Soln inhalation aerosol Inhale 2 Puffs every four hours as needed. 8.5 g 1   • Loperamide HCl (IMODIUM PO) Take  by mouth.     • cetirizine (ZYRTEC ALLERGY) 10 MG Tab Take 10 mg by mouth every day.     • Peak Flow Meter ([employer] PEAK FLOW METER) Device 1 Units by Does not apply route Once.     • Spacer/Aero-Holding Chambers (AEROCHAMBER Z-STAT PLUS/LARGE) Misc 1  Application by Does not apply route Once.     • aspirin 81 MG tablet Take 81 mg by mouth every day.     • Multiple Vitamins-Minerals (MULTIVITAMIN ADULT PO) Take 1 Tab by mouth every day.     • Vitamin D, Ergocalciferol, 50 MCG (2000 UT) Cap Take 3 Tabs by mouth every day.     • DHA-EPA-Vitamin E (OMEGA-3 COMPLEX PO) Take 1 tablet by mouth every day.     • Saw Palmetto, Serenoa repens, (SAW PALMETTO PO) Take 320 mg by mouth every day.       No current facility-administered medications for this visit.    (including changes today)  Allergies: Patient has no known allergies.    Review of Systems   Constitutional: Negative for fever, chills and malaise/fatigue.   HENT: Negative for congestion.    Eyes: Negative for pain.   Respiratory: Negative for cough and shortness of breath.    Cardiovascular: Negative for leg swelling.   Gastrointestinal: Negative for nausea, vomiting, abdominal pain and diarrhea.   Genitourinary: Negative for dysuria and hematuria.   Skin: Negative for rash.   Neurological: Negative for dizziness, focal weakness and headaches.   Endo/Heme/Allergies: Does not bruise/bleed easily.   Psychiatric/Behavioral: Negative for depression.  The patient is not nervous/anxious.      Objective:     There were no vitals taken for this visit.  There is no height or weight on file to calculate BMI.  Wt Readings from Last 4 Encounters:   04/14/21 98.9 kg (218 lb)   10/26/20 97.5 kg (215 lb)   10/14/20 97.5 kg (215 lb)   09/21/20 94.8 kg (209 lb)       Physical Exam:  Constitutional: Well-developed and well-nourished. Not diaphoretic. No distress.   Skin: Skin is warm and dry. No rash noted.  Head: Atraumatic without lesions.  Eyes: Conjunctivae and extraocular motions are normal. Pupils are equal, round, and reactive to light. No scleral icterus.   Ears:  External ears unremarkable. Tympanic membranes clear and intact.  Nose: Nares patent. Septum midline. Turbinates without erythema nor edema. No discharge.    Mouth/Throat: Dentition is ***. Tongue normal. Oropharynx is clear and moist. Posterior pharynx without erythema or exudates.  Neck: Supple, trachea midline. Normal range of motion. No thyromegaly present. No lymphadenopathy--cervical or supraclavicular.  Cardiovascular: Regular rate and rhythm, S1 and S2 without murmur, rubs, or gallops.    Lungs: Effort normal. Clear to auscultation throughout. No adventitious sounds. No CVA tenderness.  Abdomen: Soft, non tender, and without distention. Active bowel sounds in all four quadrants. No rebound, guarding, masses or HSM.  : Genitalia: {GENITALIA NEGATIVES LIST MALE:710}  Rectal: {RECTAL (QUEENIE):09292}  Prostate: {PROSTATE:87082}  Extremities: No cyanosis, clubbing, erythema, nor edema. Distal pulses intact and symmetric.   Musculoskeletal: All major joints AROM full in all directions without pain.  Neurological: Alert and oriented x 3. DTRs 2+/3 and symmetric. No cranial nerve deficit. 5/5 myotomes. Sensation intact. Negative Rhomberg.  Psychiatric:  Behavior, mood, and affect are appropriate.    A chaperone was offered to the patient during today's exam. {CHAPERONE:27671}    Assessment and Plan:     No diagnosis found.    HCM: ***.  Labs per orders.  Vaccinations per orders.  Counseling about diet, supplements, exercise, skin care and safe sex.    Follow-up: No follow-ups on file.

## 2021-10-11 ENCOUNTER — HOSPITAL ENCOUNTER (OUTPATIENT)
Dept: LAB | Facility: MEDICAL CENTER | Age: 66
End: 2021-10-11
Attending: FAMILY MEDICINE
Payer: MEDICARE

## 2021-10-11 ENCOUNTER — OFFICE VISIT (OUTPATIENT)
Dept: MEDICAL GROUP | Facility: MEDICAL CENTER | Age: 66
End: 2021-10-11
Payer: MEDICARE

## 2021-10-11 VITALS
TEMPERATURE: 97 F | WEIGHT: 207 LBS | DIASTOLIC BLOOD PRESSURE: 72 MMHG | HEART RATE: 63 BPM | BODY MASS INDEX: 28.04 KG/M2 | OXYGEN SATURATION: 96 % | RESPIRATION RATE: 12 BRPM | SYSTOLIC BLOOD PRESSURE: 124 MMHG | HEIGHT: 72 IN

## 2021-10-11 DIAGNOSIS — R73.9 HYPERGLYCEMIA: ICD-10-CM

## 2021-10-11 DIAGNOSIS — I10 PRIMARY HYPERTENSION: ICD-10-CM

## 2021-10-11 DIAGNOSIS — H91.93 HEARING DIFFICULTY OF BOTH EARS: ICD-10-CM

## 2021-10-11 DIAGNOSIS — J45.909 MILD ASTHMA WITHOUT COMPLICATION, UNSPECIFIED WHETHER PERSISTENT: ICD-10-CM

## 2021-10-11 DIAGNOSIS — Z12.5 ENCOUNTER FOR SCREENING FOR MALIGNANT NEOPLASM OF PROSTATE: ICD-10-CM

## 2021-10-11 DIAGNOSIS — F42.9 OBSESSIVE-COMPULSIVE DISORDER, UNSPECIFIED TYPE: ICD-10-CM

## 2021-10-11 DIAGNOSIS — Z23 NEED FOR VACCINATION: ICD-10-CM

## 2021-10-11 DIAGNOSIS — G47.33 OBSTRUCTIVE SLEEP APNEA: ICD-10-CM

## 2021-10-11 DIAGNOSIS — Z00.00 MEDICARE ANNUAL WELLNESS VISIT, INITIAL: ICD-10-CM

## 2021-10-11 DIAGNOSIS — I10 HYPERTENSION, UNSPECIFIED TYPE: ICD-10-CM

## 2021-10-11 DIAGNOSIS — E55.9 VITAMIN D DEFICIENCY: ICD-10-CM

## 2021-10-11 DIAGNOSIS — F90.2 ATTENTION DEFICIT HYPERACTIVITY DISORDER (ADHD), COMBINED TYPE: ICD-10-CM

## 2021-10-11 LAB
25(OH)D3 SERPL-MCNC: 69 NG/ML (ref 30–100)
ALBUMIN SERPL BCP-MCNC: 4.2 G/DL (ref 3.2–4.9)
ALBUMIN/GLOB SERPL: 1.8 G/DL
ALP SERPL-CCNC: 74 U/L (ref 30–99)
ALT SERPL-CCNC: 29 U/L (ref 2–50)
ANION GAP SERPL CALC-SCNC: 10 MMOL/L (ref 7–16)
AST SERPL-CCNC: 24 U/L (ref 12–45)
BASOPHILS # BLD AUTO: 0.5 % (ref 0–1.8)
BASOPHILS # BLD: 0.02 K/UL (ref 0–0.12)
BILIRUB SERPL-MCNC: 0.6 MG/DL (ref 0.1–1.5)
BUN SERPL-MCNC: 10 MG/DL (ref 8–22)
CALCIUM SERPL-MCNC: 8.9 MG/DL (ref 8.5–10.5)
CHLORIDE SERPL-SCNC: 103 MMOL/L (ref 96–112)
CHOLEST SERPL-MCNC: 148 MG/DL (ref 100–199)
CO2 SERPL-SCNC: 26 MMOL/L (ref 20–33)
CREAT SERPL-MCNC: 0.68 MG/DL (ref 0.5–1.4)
EOSINOPHIL # BLD AUTO: 0.12 K/UL (ref 0–0.51)
EOSINOPHIL NFR BLD: 2.8 % (ref 0–6.9)
ERYTHROCYTE [DISTWIDTH] IN BLOOD BY AUTOMATED COUNT: 40.9 FL (ref 35.9–50)
EST. AVERAGE GLUCOSE BLD GHB EST-MCNC: 108 MG/DL
FASTING STATUS PATIENT QL REPORTED: NORMAL
GLOBULIN SER CALC-MCNC: 2.4 G/DL (ref 1.9–3.5)
GLUCOSE SERPL-MCNC: 106 MG/DL (ref 65–99)
HBA1C MFR BLD: 5.4 % (ref 4–5.6)
HCT VFR BLD AUTO: 42.3 % (ref 42–52)
HDLC SERPL-MCNC: 43 MG/DL
HGB BLD-MCNC: 14.4 G/DL (ref 14–18)
IMM GRANULOCYTES # BLD AUTO: 0.02 K/UL (ref 0–0.11)
IMM GRANULOCYTES NFR BLD AUTO: 0.5 % (ref 0–0.9)
LDLC SERPL CALC-MCNC: 85 MG/DL
LYMPHOCYTES # BLD AUTO: 1.01 K/UL (ref 1–4.8)
LYMPHOCYTES NFR BLD: 23.3 % (ref 22–41)
MCH RBC QN AUTO: 31 PG (ref 27–33)
MCHC RBC AUTO-ENTMCNC: 34 G/DL (ref 33.7–35.3)
MCV RBC AUTO: 91.2 FL (ref 81.4–97.8)
MONOCYTES # BLD AUTO: 0.43 K/UL (ref 0–0.85)
MONOCYTES NFR BLD AUTO: 9.9 % (ref 0–13.4)
NEUTROPHILS # BLD AUTO: 2.74 K/UL (ref 1.82–7.42)
NEUTROPHILS NFR BLD: 63 % (ref 44–72)
NRBC # BLD AUTO: 0 K/UL
NRBC BLD-RTO: 0 /100 WBC
PLATELET # BLD AUTO: 269 K/UL (ref 164–446)
PMV BLD AUTO: 9.5 FL (ref 9–12.9)
POTASSIUM SERPL-SCNC: 4.2 MMOL/L (ref 3.6–5.5)
PROT SERPL-MCNC: 6.6 G/DL (ref 6–8.2)
PSA SERPL-MCNC: 1.65 NG/ML (ref 0–4)
RBC # BLD AUTO: 4.64 M/UL (ref 4.7–6.1)
SODIUM SERPL-SCNC: 139 MMOL/L (ref 135–145)
TRIGL SERPL-MCNC: 100 MG/DL (ref 0–149)
WBC # BLD AUTO: 4.3 K/UL (ref 4.8–10.8)

## 2021-10-11 PROCEDURE — 36415 COLL VENOUS BLD VENIPUNCTURE: CPT

## 2021-10-11 PROCEDURE — 80053 COMPREHEN METABOLIC PANEL: CPT

## 2021-10-11 PROCEDURE — G0008 ADMIN INFLUENZA VIRUS VAC: HCPCS | Performed by: FAMILY MEDICINE

## 2021-10-11 PROCEDURE — 84153 ASSAY OF PSA TOTAL: CPT

## 2021-10-11 PROCEDURE — 82306 VITAMIN D 25 HYDROXY: CPT

## 2021-10-11 PROCEDURE — 85025 COMPLETE CBC W/AUTO DIFF WBC: CPT

## 2021-10-11 PROCEDURE — 83036 HEMOGLOBIN GLYCOSYLATED A1C: CPT

## 2021-10-11 PROCEDURE — 80061 LIPID PANEL: CPT

## 2021-10-11 PROCEDURE — G0438 PPPS, INITIAL VISIT: HCPCS | Performed by: FAMILY MEDICINE

## 2021-10-11 PROCEDURE — 90662 IIV NO PRSV INCREASED AG IM: CPT | Performed by: FAMILY MEDICINE

## 2021-10-11 RX ORDER — LISINOPRIL 40 MG/1
40 TABLET ORAL
Qty: 90 TABLET | Refills: 2 | Status: SHIPPED | OUTPATIENT
Start: 2021-10-11 | End: 2022-06-06

## 2021-10-11 RX ORDER — AMLODIPINE BESYLATE 2.5 MG/1
2.5 TABLET ORAL
Qty: 90 TABLET | Refills: 2 | Status: SHIPPED | OUTPATIENT
Start: 2021-10-11 | End: 2022-10-17 | Stop reason: SDUPTHER

## 2021-10-11 RX ORDER — FLUOXETINE HYDROCHLORIDE 40 MG/1
40 CAPSULE ORAL
Qty: 90 CAPSULE | Refills: 2 | Status: SHIPPED | OUTPATIENT
Start: 2021-10-11 | End: 2022-09-14

## 2021-10-11 RX ORDER — ALBUTEROL SULFATE 90 UG/1
2 AEROSOL, METERED RESPIRATORY (INHALATION) EVERY 4 HOURS PRN
Qty: 8.5 G | Refills: 1 | Status: SHIPPED | OUTPATIENT
Start: 2021-10-11 | End: 2022-10-17 | Stop reason: SDUPTHER

## 2021-10-11 ASSESSMENT — ENCOUNTER SYMPTOMS: GENERAL WELL-BEING: EXCELLENT

## 2021-10-11 ASSESSMENT — ACTIVITIES OF DAILY LIVING (ADL): BATHING_REQUIRES_ASSISTANCE: 0

## 2021-10-11 ASSESSMENT — PATIENT HEALTH QUESTIONNAIRE - PHQ9: CLINICAL INTERPRETATION OF PHQ2 SCORE: 0

## 2021-10-11 NOTE — PROGRESS NOTES
Chief Complaint   Patient presents with   • Annual Exam       HPI:  Martin Chung III is a 66 y.o. here for Medicare Annual Wellness Visit     Asthma  Chronic issue, has not needed his inhaler since last visit.    Vitamin D deficiency  Pt is taking vitamin D 4000-6000IU daily    Obstructive sleep apnea  Chronic issue, he is using CPAP nightly and needs updated supplies through his new insurance provider.        Patient Active Problem List    Diagnosis Date Noted   • Vitamin D deficiency 10/11/2021   • Nevus 04/14/2021   • Incomplete right bundle branch block (RBBB) determined by electrocardiography 10/14/2020   • Other chest pain 10/14/2020   • Other irritable bowel syndrome 07/08/2020   • AK (actinic keratosis) 06/30/2020   • History of squamous cell carcinoma of skin 06/24/2020   • Overweight 06/24/2020   • Skin lump of leg, right 06/24/2020   • Asthma 02/24/2020   • Obstructive sleep apnea 02/06/2019   • HTN (hypertension) 01/24/2019   • Attention deficit hyperactivity disorder (ADHD), combined type 07/31/2012   • Non-toxic multinodular goiter 04/03/2008   • Obsessive compulsive disorder 04/03/2008       Current Outpatient Medications   Medication Sig Dispense Refill   • albuterol (VENTOLIN HFA) 108 (90 Base) MCG/ACT Aero Soln inhalation aerosol Inhale 2 Puffs every four hours as needed. 8.5 g 1   • amLODIPine (NORVASC) 2.5 MG Tab Take 1 Tablet by mouth every day. 90 Tablet 2   • lisinopril (PRINIVIL) 40 MG tablet Take 1 Tablet by mouth every day. 90 Tablet 2   • fluoxetine (PROZAC) 40 MG capsule Take 1 Capsule by mouth every day. 90 Capsule 2   • Loperamide HCl (IMODIUM PO) Take  by mouth.     • cetirizine (ZYRTEC ALLERGY) 10 MG Tab Take 10 mg by mouth every day.     • Peak Flow Meter ([employer] PEAK FLOW METER) Device 1 Units by Does not apply route Once.     • Spacer/Aero-Holding Chambers (AEROCHAMBER Z-STAT PLUS/LARGE) Misc 1 Application by Does not apply route Once.     • aspirin 81 MG tablet Take 81  mg by mouth every day.     • Multiple Vitamins-Minerals (MULTIVITAMIN ADULT PO) Take 1 Tab by mouth every day.     • Vitamin D, Ergocalciferol, 50 MCG (2000 UT) Cap Take 3 Tabs by mouth every day.     • DHA-EPA-Vitamin E (OMEGA-3 COMPLEX PO) Take 1 tablet by mouth every day.     • Saw Palmetto, Serenoa repens, (SAW PALMETTO PO) Take 320 mg by mouth every day.       No current facility-administered medications for this visit.          Current supplements as per medication list.     Allergies: Patient has no known allergies.    Current social contact/activities:      He  reports that he has never smoked. He has never used smokeless tobacco. He reports current alcohol use. He reports previous drug use.  Counseling given: Not Answered      DPA/Advanced Directive:  Patient does not have an Advanced Directive.  A packet and workshop information was given on Advanced Directives.    ROS:    Gait: Uses no assistive device  Ostomy: No  Other tubes: No  Amputations: No  Chronic oxygen use: No  Last eye exam: 2019  Wears hearing aids: No   : Denies any urinary leakage during the last 6 months    Screening:    Depression Screening    Little interest or pleasure in doing things?  0 - not at all  Feeling down, depressed , or hopeless? 0 - not at all  Patient Health Questionnaire Score: 0     If depressive symptoms identified deferred to follow up visit unless specifically addressed in assessment and plan.    Interpretation of PHQ-9 Total Score   Score Severity   1-4 No Depression   5-9 Mild Depression   10-14 Moderate Depression   15-19 Moderately Severe Depression   20-27 Severe Depression    Screening for Cognitive Impairment    Three Minute Recall (captain, garden, picture) 3/3 3/3  Obey clock face with all 12 numbers and set the hands to show 5 past 8.  Yes 5/5  Cognitive concerns identified deferred for follow up unless specifically addressed in assessment and plan.    Fall Risk Assessment    Has the patient had two or more  falls in the last year or any fall with injury in the last year?  No    Safety Assessment    Throw rugs on floor.  Yes  Handrails on all stairs.  No  Good lighting in all hallways.  Yes  Difficulty hearing.  No  Patient counseled about all safety risks that were identified.    Functional Assessment ADLs    Are there any barriers preventing you from cooking for yourself or meeting nutritional needs?  No.    Are there any barriers preventing you from driving safely or obtaining transportation?  No.    Are there any barriers preventing you from using a telephone or calling for help?  No.    Are there any barriers preventing you from shopping?  No.    Are there any barriers preventing you from taking care of your own finances?  No.    Are there any barriers preventing you from managing your medications?  No.    Are there any barriers preventing you from showering, bathing or dressing yourself?  No.    Are you currently engaging in any exercise or physical activity?  Yes.     What is your perception of your health?  Excellent.      Health Maintenance Summary                IMM PNEUMOCOCCAL VACCINE: 65+ Years Next Due 7/1/2025      Done 7/1/2020 Imm Admin: Pneumococcal polysaccharide vaccine (PPSV-23)    COLORECTAL CANCER SCREENING Next Due 9/10/2030     IMM DTaP/Tdap/Td Vaccine Next Due 11/25/2030      Done 11/25/2020 Imm Admin: Tdap Vaccine     Patient has more history with this topic...          Patient Care Team:  Carolyne Sousa D.O. as PCP - General (Family Medicine)  Jesus Galan Ass't as [facility]         Social History     Tobacco Use   • Smoking status: Never Smoker   • Smokeless tobacco: Never Used   Vaping Use   • Vaping Use: Never used   Substance Use Topics   • Alcohol use: Yes   • Drug use: Not Currently     Family History   Problem Relation Age of Onset   • Cancer Mother         breast cancer   • Hypertension Mother    • Stroke Father    • Cancer Other         prostate  cancer   • Psychiatric Illness Other         depression   • Migraines Other    • Diabetes Neg Hx    • Hyperlipidemia Neg Hx      He  has no past medical history on file.   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    Hearing fair.    Dentition: deferred given current COVID19 pandemic.    CV: Regular rate and rhythm  Lungs:CTABL  Ext: no edma  Alert, oriented in no acute distress.  Eye contact is good, speech goal directed, affect calm    Assessment and Plan. The following treatment and monitoring plan is recommended:      1. Medicare annual wellness visit, initial  Pt is doing well with no acute complaints.  Labs ordered.  Vaccines updated.  COVID19 booster recommended 6 months after his last dose. HM per below  - Comp Metabolic Panel; Future  - CBC WITH DIFFERENTIAL; Future  - Lipid Profile; Future  - HEMOGLOBIN A1C; Future  - VITAMIN D,25 HYDROXY; Future  - PROSTATE SPECIFIC AG SCREENING; Future    2. Primary hypertension  Chronic, well controlled, asymptomatic.  Monitor labs.  Continue current medications.  - Comp Metabolic Panel; Future  - Lipid Profile; Future  - HEMOGLOBIN A1C; Future  - amLODIPine (NORVASC) 2.5 MG Tab; Take 1 Tablet by mouth every day.  Dispense: 90 Tablet; Refill: 2  - lisinopril (PRINIVIL) 40 MG tablet; Take 1 Tablet by mouth every day.  Dispense: 90 Tablet; Refill: 2    3. Mild asthma without complication, unspecified whether persistent  Chronic, well controlled, asymptomatic.  Refill given.  Follow-up precautions given  - CBC WITH DIFFERENTIAL; Future  - albuterol (VENTOLIN HFA) 108 (90 Base) MCG/ACT Aero Soln inhalation aerosol; Inhale 2 Puffs every four hours as needed.   Dispense: 8.5 g; Refill: 1    4. Obsessive-compulsive disorder, unspecified type  Chronic, well controlled with current medications. No SI/HI. Refill given.  Will monitor  - fluoxetine (PROZAC) 40 MG capsule; Take 1 Capsule by mouth every day.  Dispense: 90 Capsule; Refill: 2    5. Attention deficit hyperactivity disorder (ADHD), combined type  Chronic, well controlled with current medications. No SI/HI. Refill given.  Will monitor  - fluoxetine (PROZAC) 40 MG capsule; Take 1 Capsule by mouth every day.  Dispense: 90 Capsule; Refill: 2    6. Vitamin D deficiency  Chronic issue, on supplement of vitamin D3 4-6000IU daily.  Check labs and redose supplement prn  - VITAMIN D,25 HYDROXY; Future    7. Obstructive sleep apnea  Chronic issue, well controlled with current CPAP.  Refill of supplies DME form submitted.  Sleep referral given for retesting given his recent weight loss in an attempt to improve his FLO may have altered his setting needs. Patient expressed understanding and agreement with plan.  - REFERRAL TO PULMONARY AND SLEEP MEDICINE    8. Encounter for screening for malignant neoplasm of prostate  Asymptomatic.  Screening lab ordered.  - PROSTATE SPECIFIC AG SCREENING; Future    9. Hearing difficulty of both ears  Chronic, worsening issues.  TMs normal bilaterally today.  Referral given.  - REFERRAL TO AUDIOLOGY    10. Hyperglycemia  Chronic issue, asymptomatic.  Will check labs.  Dietary and lifestyle modifications discussed.  Will montior  - Comp Metabolic Panel; Future  - HEMOGLOBIN A1C; Future    11. Need for vaccination  Pt desires vaccination.  Risks and benefits discussed.  No contraindications today.  Vaccine given.  Follow-up precautions discussed.  - Influenza Vaccine, High Dose (65+ Only)    Services suggested: No services needed at this time  Health Care Screening: Age-appropriate preventive services recommended by USPTF and ACIP covered by Medicare were discussed today. Services ordered if  indicated and agreed upon by the patient.  Referrals offered: Community-based lifestyle interventions to reduce health risks and promote self-management and wellness, fall prevention, nutrition, physical activity, tobacco-use cessation, weight loss, and mental health services as per orders if indicated.    Discussion today about general wellness and lifestyle habits:    · Prevent falls and reduce trip hazards; Cautioned about securing or removing rugs.  · Have a working fire alarm and carbon monoxide detector;   · Engage in regular physical activity and social activities     Follow-up: Return in about 6 months (around 4/11/2022) for HTN follow-up, Medication review.

## 2021-10-11 NOTE — ASSESSMENT & PLAN NOTE
Chronic issue, he is using CPAP nightly and needs updated supplies through his new insurance provider.

## 2021-12-02 ENCOUNTER — TELEMEDICINE (OUTPATIENT)
Dept: SLEEP MEDICINE | Facility: MEDICAL CENTER | Age: 66
End: 2021-12-02
Payer: MEDICARE

## 2021-12-02 VITALS — HEIGHT: 72 IN | WEIGHT: 205 LBS | BODY MASS INDEX: 27.77 KG/M2

## 2021-12-02 DIAGNOSIS — G47.33 OSA ON CPAP: Primary | ICD-10-CM

## 2021-12-02 PROCEDURE — 99203 OFFICE O/P NEW LOW 30 MIN: CPT | Mod: 95 | Performed by: STUDENT IN AN ORGANIZED HEALTH CARE EDUCATION/TRAINING PROGRAM

## 2021-12-02 ASSESSMENT — FIBROSIS 4 INDEX: FIB4 SCORE: 1.08

## 2021-12-02 NOTE — PROGRESS NOTES
[facility] Consult Note     Date: 2021 / Time: 8:09 AM    CC: Telemedicine sleep consultation    This sleep consultation is provided using Telemedicine and secure encrypted software. Consent was obtained.    Consulting MD: Amanuel Souza M.D.  Requesting Physician: Carolyne Sousa D.O.  Patient name:      Martin Chung III  : 1955   MRN:  [id number]    Please note that I could not evaluate the patient in person at the site. All examination and evaluation of the patient and information gathering from the patient and/or the family were done jointly by me via licensed and securely encrypted tele-video communication equipment at the originating site.  As such, my assessments and recommendations are limited as far as such telecommunication allows. I am located and patient is located in [place].      HISTORY OF PRESENT ILLNESS:     Martin Chung III is a 66 y.o. male with OCD, ADHD, asthma, hypertension, seasonal allergies and severe obstructive sleep apnea on CPAP.  Presents to Sleep Clinic to establish care for obstructive sleep apnea.    He was diagnosed at [facility] around .  At which time he was placed on CPAP therapy which she has continued.  He recalls his sleep apnea was severe with an AHI of 37.  Once started on Pap therapy he found great improvement in his nightly sleep.  Prior to starting PAP therapy he had many episodes where he would wake up gasping at night.  In addition he found a sleep unrefreshing and he had daytime sleepiness.  Following being placed on Pap therapy this greatly improved his sleep as well as energy level during the day.    His current machine is approximately 1-year-old which she did [facility].    DME provider: [facility]   Device: Airsense 10   Mask: Full face   Aerophagia: No   Snoring: No   Dry mouth: No   Leak: No   Skin irritation: No   Chin strap: No     Sleep Schedule  Bedtime: Weekday &  "Weekend 10pm  Wake time: Weekday & Weekend 6-7am   Sleep-onset latency: 5-10min   Awakenings from sleep: 1-2 to use restroom   Difficulty falling back asleep: none   Bedroom partner: Wife   Naps: No     DAYTIME SYMPTOMS:   Excessive daytime sleepiness: No   Daytime fatigue: No   Difficulty concentrating: No   Memory problems: No   Irritability:No   Work/school performance issues: No   Sleepiness with driving: No   Caffeine/stimulant use: Yes  Alcohol use:Yes     SLEEP RELATED SYMPTOMS  Snoring: NO   Witnessed apnea or gasping/choking: No   Dry mouth or mouth breathing: No   Sweating: No   Teeth grinding/biting: No   Morning headaches: No   Refreshed Upon Awakening: Yes     SLEEP RELATED BEHAVIORS:  Parasomnias (walking, talking, eating, violence): No   Leg kicking: No   Restless legs - \"urge to move\": No   Nightmares: No  Recurrent: No   Dream enactment: No      NARCOLEPSY:  Cataplexy: No   Sleep paralysis: No   Sleep attacks: No   Hypnagogic/hypnopompic hallucinations: No     MEDICAL HISTORY  Past Medical History:   Diagnosis Date   • Chickenpox    • Swedish measles    • Mumps    • Sleep apnea         SURGICAL HISTORY  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        FAMILY HISTORY  Family History   Problem Relation Age of Onset   • Cancer Mother         breast cancer   • Hypertension Mother    • Stroke Father    • Cancer Other         prostate cancer   • Psychiatric Illness Other         depression   • Migraines Other    • Diabetes Neg Hx    • Hyperlipidemia Neg Hx        SOCIAL HISTORY  Social History     Socioeconomic History   • Marital status:      Spouse name: Not on file   • Number of children: Not on file   • Years of education: Not on file   • Highest education level: Bachelor's degree (e.g., BA, AB, BS)   Occupational History   • Not on file   Tobacco Use   • Smoking status: Never Smoker   • Smokeless tobacco: Never Used "   Vaping Use   • Vaping Use: Never used   Substance and Sexual Activity   • Alcohol use: Yes   • Drug use: Not Currently   • Sexual activity: Yes     Partners: Female   Other Topics Concern   • Not on file   Social History Narrative   • Not on file     Social Determinants of Health     Financial Resource Strain: Low Risk    • Difficulty of Paying Living Expenses: Not hard at all   Food Insecurity: No Food Insecurity   • Worried About Running Out of Food in the Last Year: Never true   • Ran Out of Food in the Last Year: Never true   Transportation Needs: No Transportation Needs   • Lack of Transportation (Medical): No   • Lack of Transportation (Non-Medical): No   Physical Activity: Sufficiently Active   • Days of Exercise per Week: 7 days   • Minutes of Exercise per Session: 60 min   Stress: No Stress Concern Present   • Feeling of Stress : Not at all   Social Connections: Socially Integrated   • Frequency of Communication with Friends and Family: More than three times a week   • Frequency of Social Gatherings with Friends and Family: More than three times a week   • Attends Scientologist Services: More than 4 times per year   • Active Member of Clubs or Organizations: Yes   • Attends Club or Organization Meetings: More than 4 times per year   • Marital Status:    Intimate Partner Violence:    • Fear of Current or Ex-Partner: Not on file   • Emotionally Abused: Not on file   • Physically Abused: Not on file   • Sexually Abused: Not on file   Housing Stability: Low Risk    • Unable to Pay for Housing in the Last Year: No   • Number of Places Lived in the Last Year: 1   • Unstable Housing in the Last Year: No        Occupation: Retired    CURRENT MEDICATIONS  Current Outpatient Medications   Medication Sig Dispense Refill   • albuterol (VENTOLIN HFA) 108 (90 Base) MCG/ACT Aero Soln inhalation aerosol Inhale 2 Puffs every four hours as needed. 8.5 g 1   • amLODIPine (NORVASC) 2.5 MG Tab Take 1 Tablet by mouth every  day. 90 Tablet 2   • lisinopril (PRINIVIL) 40 MG tablet Take 1 Tablet by mouth every day. 90 Tablet 2   • fluoxetine (PROZAC) 40 MG capsule Take 1 Capsule by mouth every day. 90 Capsule 2   • cetirizine (ZYRTEC ALLERGY) 10 MG Tab Take 10 mg by mouth every day.     • aspirin 81 MG tablet Take 81 mg by mouth every day.     • Multiple Vitamins-Minerals (MULTIVITAMIN ADULT PO) Take 1 Tab by mouth every day.     • Vitamin D, Ergocalciferol, 50 MCG (2000 UT) Cap Take 3 Tabs by mouth every day.     • DHA-EPA-Vitamin E (OMEGA-3 COMPLEX PO) Take 1 tablet by mouth every day.     • Saw Palmetto, Serenoa repens, (SAW PALMETTO PO) Take 320 mg by mouth every day.     • Loperamide HCl (IMODIUM PO) Take  by mouth.     • Peak Flow Meter ([employer] PEAK FLOW METER) Device 1 Units by Does not apply route Once.     • Spacer/Aero-Holding Chambers (AEROCHAMBER Z-STAT PLUS/LARGE) Misc 1 Application by Does not apply route Once.       No current facility-administered medications for this visit.       REVIEW OF SYSTEMS  Constitutional: Denies fevers, Denies weight changes  Ears/Nose/Throat/Mouth: Denies nasal congestion or sore throat   Cardiovascular: Denies chest pain or palpitations   Respiratory: Denies shortness of breath, Denies cough  Gastrointestinal/Hepatic: Denies abdominal pain, nausea, vomiting, diarrhea  Genitourinary: Denies nocturia  Musculoskeletal/Rheum: Denies  joint pain and swelling   Skin/Breast: Denies rash  Sleep: See HPI    Physical Examination:  Vitals/ General Appearance:   Weight/BMI: Body mass index is 27.8 kg/m².  Ht 1.829 m (6')   Wt 93 kg (205 lb)   Vitals:    12/02/21 0806   Weight: 93 kg (205 lb)   Height: 1.829 m (6')       General: alert and oriented to time, place and person. Cooperative and in no apparent distress.   Constitutional: Alert, no distress, well-groomed.  Voice: normal volume and kait  Head: normocephalic   Pulmonary: Voice normal volume and kait. No sounds or signs of increased work of  breathing.   Neurologic: No involuntary movements     Medical decision making  The medical record was reviewed. Care gaps identified and reviewed with the patient.    Diagnostic and titration nocturnal polysomnogram's, home sleep apnea tests, continuous nocturnal oximetry results, multiple sleep latency tests, and compliance reports reviewed.    Assessment and Plan:    Martin Chung III is a 66 y.o. male with OCD, ADHD, asthma, hypertension, seasonal allergies and severe obstructive sleep apnea on CPAP.  Presents to Sleep Clinic to establish care for obstructive sleep apnea.    The medical record was reviewed.      Obstructive sleep apnea  Compliance data reviewed showing 100% usage > 4hours in last 30  days. Average AHI 1.2 events/hour.  Fixed pressure 11 cmH2O.  Pt continues to use and benefit from machine. Overall doing well at current pressure.     PLAN:   -Order placed for mask and supplies   -Advised to reach out via MyChart with questions     Has been advised to continue the current CPAP, clean equipment frequently, and get new mask and supplies as allowed by insurance and DME. Recommend an earlier appointment, if significant treatment barriers develop.    The risks of untreated sleep apnea were discussed with the patient at length. Patients with FLO are at increased risk of cardiovascular disease including coronary artery disease, systemic arterial hypertension, pulmonary arterial hypertension, cardiac arrythmias, and stroke. The patient was advised to avoid driving a motor vehicle when drowsy.    Positive airway pressure will favorably impact many of the adverse conditions and effects provoked by FLO.    Have advised the patient to follow up with the appropriate healthcare practitioners for all other medical problems and issues.    Return in about 1 year (around 12/2/2022).    Please note portions of this record was created using voice recognition software. I have made every reasonable attempt to  correct obvious errors, but I expect that there are errors of grammar and possibly content I did not discover before finalizing the note.

## 2021-12-02 NOTE — PATIENT INSTRUCTIONS
"CPAP and BPAP Information  CPAP and BPAP are methods of helping a person breathe with the use of air pressure. CPAP stands for \"continuous positive airway pressure.\" BPAP stands for \"bi-level positive airway pressure.\" In both methods, air is blown through your nose or mouth and into your air passages to help you breathe well.  CPAP and BPAP use different amounts of pressure to blow air. With CPAP, the amount of pressure stays the same while you breathe in and out. With BPAP, the amount of pressure is increased when you breathe in (inhale) so that you can take larger breaths. Your health care provider will recommend whether CPAP or BPAP would be more helpful for you.  Why are CPAP and BPAP treatments used?  CPAP or BPAP can be helpful if you have:  · Sleep apnea.  · Chronic obstructive pulmonary disease (COPD).  · Heart failure.  · Medical conditions that weaken the muscles of the chest including muscular dystrophy, or neurological diseases such as amyotrophic lateral sclerosis (ALS).  · Other problems that cause breathing to be weak, abnormal, or difficult.  CPAP is most commonly used for obstructive sleep apnea (FLO) to keep the airways from collapsing when the muscles relax during sleep.  How is CPAP or BPAP administered?  Both CPAP and BPAP are provided by a small machine with a flexible plastic tube that attaches to a plastic mask. You wear the mask. Air is blown through the mask into your nose or mouth. The amount of pressure that is used to blow the air can be adjusted on the machine. Your health care provider will determine the pressure setting that should be used based on your individual needs.  When should CPAP or BPAP be used?  In most cases, the mask only needs to be worn during sleep. Generally, the mask needs to be worn throughout the night and during any daytime naps. People with certain medical conditions may also need to wear the mask at other times when they are awake. Follow instructions from your " health care provider about when to use the machine.  What are some tips for using the mask?    · Because the mask needs to be snug, some people feel trapped or closed-in (claustrophobic) when first using the mask. If you feel this way, you may need to get used to the mask. One way to do this is by holding the mask loosely over your nose or mouth and then gradually applying the mask more snugly. You can also gradually increase the amount of time that you use the mask.  · Masks are available in various types and sizes. Some fit over your mouth and nose while others fit over just your nose. If your mask does not fit well, talk with your health care provider about getting a different one.  · If you are using a mask that fits over your nose and you tend to breathe through your mouth, a chin strap may be applied to help keep your mouth closed.  · The CPAP and BPAP machines have alarms that may sound if the mask comes off or develops a leak.  · If you have trouble with the mask, it is very important that you talk with your health care provider about finding a way to make the mask easier to tolerate. Do not stop using the mask. Stopping the use of the mask could have a negative impact on your health.  What are some tips for using the machine?  · Place your CPAP or BPAP machine on a secure table or stand near an electrical outlet.  · Know where the on/off switch is located on the machine.  · Follow instructions from your health care provider about how to set the pressure on your machine and when you should use it.  · Do not eat or drink while the CPAP or BPAP machine is on. Food or fluids could get pushed into your lungs by the pressure of the CPAP or BPAP.  · Do not smoke. Tobacco smoke residue can damage the machine.  · For home use, CPAP and BPAP machines can be rented or purchased through home health care companies. Many different brands of machines are available. Renting a machine before purchasing may help you find out  which particular machine works well for you.  · Keep the CPAP or BPAP machine and attachments clean. Ask your health care provider for specific instructions.  Get help right away if:  · You have redness or open areas around your nose or mouth where the mask fits.  · You have trouble using the CPAP or BPAP machine.  · You cannot tolerate wearing the CPAP or BPAP mask.  · You have pain, discomfort, and bloating in your abdomen.  Summary  · CPAP and BPAP are methods of helping a person breathe with the use of air pressure.  · Both CPAP and BPAP are provided by a small machine with a flexible plastic tube that attaches to a plastic mask.  · If you have trouble with the mask, it is very important that you talk with your health care provider about finding a way to make the mask easier to tolerate.  This information is not intended to replace advice given to you by your health care provider. Make sure you discuss any questions you have with your health care provider.  Document Released: 09/15/2005 Document Revised: 04/08/2020 Document Reviewed: 11/06/2017  Elsevier Patient Education © 2020 [employer] Inc[employer]

## 2022-02-03 ENCOUNTER — PATIENT MESSAGE (OUTPATIENT)
Dept: HEALTH INFORMATION MANAGEMENT | Facility: OTHER | Age: 67
End: 2022-02-03
Payer: MEDICARE

## 2022-02-09 ENCOUNTER — TELEPHONE (OUTPATIENT)
Dept: HEALTH INFORMATION MANAGEMENT | Facility: OTHER | Age: 67
End: 2022-02-09

## 2022-02-09 NOTE — TELEPHONE ENCOUNTER
Member called to schedule his SHOAIB. He did state he currently has Covid. I did schedule him two weeks out and advised him to call if he is still sick so that we can reschedule. Scheduled member on 02/21/22 at 8 am with Dr. Contreras. HIPAA verified. Transferred his call to the MA for Carolyne Sousa. Member did return In[employer] message

## 2022-02-22 PROBLEM — R03.0 ELEVATED BLOOD PRESSURE READING WITHOUT DIAGNOSIS OF HYPERTENSION: Status: ACTIVE | Noted: 2022-02-22

## 2022-02-22 PROBLEM — E66.9 OBESITY (BMI 30.0-34.9): Status: ACTIVE | Noted: 2022-02-22

## 2022-02-22 PROBLEM — R03.0 ELEVATED BLOOD PRESSURE READING WITHOUT DIAGNOSIS OF HYPERTENSION: Status: RESOLVED | Noted: 2022-02-22 | Resolved: 2022-02-22

## 2022-02-22 PROBLEM — R94.30 NONSPECIFIC ABNORMAL FUNCTION STUDY, CARDIOVASCULAR: Status: ACTIVE | Noted: 2022-02-22

## 2022-02-22 PROBLEM — R73.03 PREDIABETES: Status: ACTIVE | Noted: 2022-02-22

## 2022-02-22 PROBLEM — B35.1 ONYCHOMYCOSIS: Status: ACTIVE | Noted: 2022-02-22

## 2022-04-07 ENCOUNTER — OFFICE VISIT (OUTPATIENT)
Dept: DERMATOLOGY | Facility: IMAGING CENTER | Age: 67
End: 2022-04-07
Payer: MEDICARE

## 2022-04-07 DIAGNOSIS — D22.9 NEVUS: ICD-10-CM

## 2022-04-07 DIAGNOSIS — L82.1 SEBORRHEIC KERATOSIS: ICD-10-CM

## 2022-04-07 DIAGNOSIS — L81.4 LENTIGO: ICD-10-CM

## 2022-04-07 DIAGNOSIS — L81.9 HYPOPIGMENTATION: ICD-10-CM

## 2022-04-07 DIAGNOSIS — Z85.89 HISTORY OF SQUAMOUS CELL CARCINOMA: ICD-10-CM

## 2022-04-07 DIAGNOSIS — L90.8 SKIN AGING: ICD-10-CM

## 2022-04-07 DIAGNOSIS — Z12.83 SKIN CANCER SCREENING: ICD-10-CM

## 2022-04-07 PROCEDURE — 99213 OFFICE O/P EST LOW 20 MIN: CPT | Performed by: DERMATOLOGY

## 2022-04-07 NOTE — PROGRESS NOTES
CC: CONSTANCE      Subjective: Prev seen patient here for skin check    Patient returns for annual skin cancer screening.  Patient states no new, changing, growing or bleeding skin lesions of concern.      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 No itch.  No cough    Relevant PMH: mult med comorbidities  Social: NS    PE: Gen:WDWN male in NAD. Skin: Scalp/face/eyes/lips/neck/chest/back/arms/legs/hands/feet/buttocks - without suspicious lesions noted.  Genitals exam declined  -scar left facial cheek, well healed  -scattered hyperpigmented macules/papules, appearing benign on torso and extremities  -scattered hypopigmented macules on arms, fewer legs  -no neck LAD palpated    A/P: IGH/PIPA, arms:  -b/r  -sunprotection reviewed    Nevi: benign appearing:  -RTC PRN growth/changes/concerning features    Lentigos/SKs: benign  -reassurance  -RTC PRN growth/changes/concerning features    Hx of skin cancer: NER  -cont'd sunprotection and skin cancer surveillance  -Q 6mo-annual exam recommended; f/u suspicious lesions PRN    F/u 1 year/PRN    I have reviewed medications relevant to my specialty.

## 2022-06-04 DIAGNOSIS — I10 HYPERTENSION, UNSPECIFIED TYPE: ICD-10-CM

## 2022-06-06 RX ORDER — LISINOPRIL 40 MG/1
40 TABLET ORAL
Qty: 100 TABLET | Refills: 2 | Status: SHIPPED | OUTPATIENT
Start: 2022-06-06 | End: 2022-10-17 | Stop reason: SDUPTHER

## 2022-08-11 ENCOUNTER — OFFICE VISIT (OUTPATIENT)
Dept: DERMATOLOGY | Facility: IMAGING CENTER | Age: 67
End: 2022-08-11
Payer: MEDICARE

## 2022-08-11 DIAGNOSIS — L57.0 AK (ACTINIC KERATOSIS): ICD-10-CM

## 2022-08-11 DIAGNOSIS — L57.0 ACTINIC KERATOSIS: ICD-10-CM

## 2022-08-11 DIAGNOSIS — D69.2 SENILE PURPURA (HCC): ICD-10-CM

## 2022-08-11 DIAGNOSIS — D49.2 NEOPLASM OF SKIN: ICD-10-CM

## 2022-08-11 DIAGNOSIS — D23.9 DERMATOFIBROMA: ICD-10-CM

## 2022-08-11 PROCEDURE — 17000 DESTRUCT PREMALG LESION: CPT | Mod: 59 | Performed by: DERMATOLOGY

## 2022-08-11 PROCEDURE — 11104 PUNCH BX SKIN SINGLE LESION: CPT | Performed by: DERMATOLOGY

## 2022-08-11 PROCEDURE — 99213 OFFICE O/P EST LOW 20 MIN: CPT | Mod: 25 | Performed by: DERMATOLOGY

## 2022-08-11 NOTE — PROGRESS NOTES
CC: lesion on arm       Subjective: Prev seen patient here for skin lesion    Pt here 2 spots on arms one left and one rt, lt arm lesion tender,  growing.    2 spots on right lower leg, desires eval.     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 No itch.  No cough    Relevant PMH: mult med comorbidities  Social: NS    PE: Gen:WDWN male in NAD. Skin: focal exam:  -thin HK papule r arm  -pink papulonodule left arm, 5mm  -dome-shaped papule, center stellate whitening right upper medial shin  -pink macule right lateral upper shin    A/P: Neoplasm NOS: left arm DF/NF/other  -consent for bx, including R/B/A. Cleaned with EtOH, anesthesia with lidocaine 1% + epinephrine, punch bx, 4-0 Prolene to close  -vaseline/bandage and wound care reviewed  -s/r in 1-2 weeks    AKs: r arm  -counseled on diagnosis and treatment, including risks/benefits/alternatives of cyrotherapy  -LN2 25 sec X 2 cycles X 1lesions  -f/u if persists at 1 month    Senile pururpa, r leg  DF, r leg  -benign      F/u PRN    I have reviewed medications relevant to my specialty.

## 2022-08-22 ENCOUNTER — TELEPHONE (OUTPATIENT)
Dept: DERMATOLOGY | Facility: IMAGING CENTER | Age: 67
End: 2022-08-22
Payer: MEDICARE

## 2022-08-23 ENCOUNTER — APPOINTMENT (OUTPATIENT)
Dept: DERMATOLOGY | Facility: IMAGING CENTER | Age: 67
End: 2022-08-23
Payer: MEDICARE

## 2022-09-14 DIAGNOSIS — F90.2 ATTENTION DEFICIT HYPERACTIVITY DISORDER (ADHD), COMBINED TYPE: ICD-10-CM

## 2022-09-14 DIAGNOSIS — F42.9 OBSESSIVE-COMPULSIVE DISORDER, UNSPECIFIED TYPE: ICD-10-CM

## 2022-09-14 RX ORDER — FLUOXETINE HYDROCHLORIDE 40 MG/1
40 CAPSULE ORAL
Qty: 90 CAPSULE | Refills: 2 | Status: SHIPPED | OUTPATIENT
Start: 2022-09-14 | End: 2022-10-17 | Stop reason: SDUPTHER

## 2022-09-26 NOTE — TELEPHONE ENCOUNTER
"Regarding: RE:Hello,  ----- Message from Carolyne Sousa D.O. sent at 9/16/2020 12:43 PM PDT -----  Please fax in the orders that I signed and scan them for our records. -Dr. Sousa     ----- Message from Chucky Chung to Roma Jauregui, Med Ass't sent at 9/14/2020  2:18 PM -----   Hi Dr Sousa & Roma,    Please email another prescription to [facility] for the ResMed Airsense 10 Autoset CPAP Machine and the contact that I spoke to said the verbiage, on the script, must also include: \"CPAP Machine at 11 cmh 20 with heated humidifier and supplies\". Hopefully this time is a charm and it goes through appropriately!    ThanksChucky-1955      ----- Message -----       From:Roma Jauregui, Med Ass't       Sent:9/2/2020  8:31 AM PDT         To:Martin Chung    Subject:RE:Hello,    Of course!  Thank you, take care.  RISA Brandon      ----- Message -----       From:Martin Chung       Sent:9/1/2020  6:23 PM PDT         To:Roma Jauregui, Med Ass't    Subject:RE:Victorino Hancock! Thanks for making that happen. Chucky      ----- Message -----       From:Roma Jauregui, Med Ass't       Sent:9/1/2020  4:53 PM PDT         To:Martin Chung    Subject:RE:Hello,    Hi Salazar,  I found your sleep study results from [place], we have re-faxed your DME and your sleep study results.  Please let me know if you have any questions.  Thanks,  RISA Brandon      ----- Message -----       From:Martin Chung       Sent:9/1/2020  4:35 PM PDT         To:Roma Jauregui, Med Ass't    Subject:RE:Daniel Hancock,    I did the sleep study for Sleep Apnea through [facility] approximately a year and a half ago, give or take, and it should be in the medical information that was downloaded from my [place] CD to [facility]. The procedure was performed from my home through a device that was attached to my finger and it recorded the number of stop breathing episodes I had every hour which was in the mid to " Discharge Instruction for Undelivered Patients      You were seen for:  Decreased Fetal Movement  We Consulted: Dr Conde/ Dr Davis  You had (Test or Medicine):Ultrasound     Diet:   Drink 8 to 12 glasses of liquids (milk, juice, water) every day.  You may eat meals and snacks.     Activity:  Rest     Call your provider if you notice:  Swelling in your face or increased swelling in your hands or legs.  Headaches that are not relieved by Tylenol (acetaminophen).  Changes in your vision (blurring: seeing spots or stars.)  Nausea (sick to your stomach) and vomiting (throwing up).   Weight gain of 5 pounds or more per week.  Heartburn that doesn't go away.  Signs of bladder infection: pain when you urinate (use the toilet), need to go more often and more urgently.  The bag of patel (rupture of membranes) breaks, or you notice leaking in your underwear.  Bright red blood in your underwear.  Abdominal (lower belly) or stomach pain.  For first baby: Contractions (tightening) less than 5 minutes apart for one hour or more.  Second (plus) baby: Contractions (tightening) less than 10 minutes apart and getting stronger.  *If less than 34 weeks: Contractions (tightening) more than 6 times in one hour.  Increase or change in vaginal discharge (note the color and amount)  Other: Rest and drink plenty of water    Follow-up:  As scheduled in the clinic         high 30's so I have an extreme case of this disease state. Please let me know if you can uncover the info on your end but if not I will do some digging and hopefully this is helpful to move the process forward so that I can get a new CPAP machine ASAP.    Thanks for getting back to me!    Chucky Chung      ----- Message -----       From:Jesus Horne Ass't       Sent:2020  2:38 PM PDT         To:Martin Chung    Subject:RE:Daniel Hancock,  We just need to know when and where you did your sleep study so we can request the results to send to your DME company.  Thanks,  RISA Brandon      ----- Message -----       From:Martin Chung       Sent:2020 12:17 PM PDT         To:Carolyne Sousa D.O.    Subject:RE:Daniel Hancock Dr.,    [facility] has sent you another prescription request for my ResMed AirSense 10 Autoset CPAP Machine. They said they need a pressure setting and my current machine is set at 11.6, at least that's what it looks like to me when I checked, but they also said they need a confirmation that I did complete a Sleep Apnea test too, which I did get done. Please confirm that you have received the prescription and I appreciate your expediting this process. I hope all is going well for you and your family! Go [place] & Go [facility] [place]!    Thanks,    Chucky Chung -1955 Member # [id number]      ----- Message -----       From:Carolyne Sousa D.O.       Sent:2020 11:22 AM PDT         To:Martin Chung    Subject:Hello,    Thank you for your message! I am currently out of the office so there may be a delayed response. Your message has been passed on to another provider to assist in my absence.    Thank you,  Carolyne Sousa D.O.      ----- Message -----       From:Martin Chung       Sent:2020 11:22 AM PDT         To:Carolyne Sousa D.O.    Subject:RE:Daniel Hancock Dr.,    The name of the company for CPAP equipment/supplies is actually  called [facility] but again everything is squared away and your prescription is on file with them!    Thanks,    Chucky Chung-Member # [id number]      ----- Message -----       From:Carolyne Sousa D.O.       Sent:8/5/2020 11:13 AM PDT         To:Martin Chung    Subject:Hello,    Thank you for your message! I am currently out of the office so there may be a delayed response. Your message has been passed on to another provider to assist in my absence.    Thank you,  Carolyne Sousa D.O.      ----- Message -----       From:Martin Chung       Sent:8/5/2020 11:13 AM PDT         To:Carolyne Sousa D.O.    Subject:RE:Karissa,    Daniel Sousa,    I just got off the phone with [facility] and they did receive your prescription for my CPAP Equipment and I have ordered my supplies today so everything is squared away!    Thanks,    Chucky Chung-Member # [id number]-[facility] 1955      ----- Message -----       From:Carolyne Sousa D.O.       Sent:8/3/2020  2:26 PM PDT         To:Martin Chung    Subject:Hello,    Thank you for your message! I am currently out of the office so there may be a delayed response. Your message has been passed on to another provider to assist in my absence.    Thank you,  Carolyne Sousa D.O.      ----- Message -----       From:Martin Chung       Sent:8/3/2020  2:26 PM PDT         To:Carolyne Sousa D.O.    Subject:Non-Urgent Medical Question    Daniel Sousa,    I have not heard from [facility] yet, for the CPAP supplies, and hopefully you can reach out to them again! I hope all is going well for you and your family!    Thanks,    Chucky Chung-Member # [id number]      ----- Message -----       From:Carolyne Sousa D.O.       Sent:7/13/2020  2:30 PM PDT         To:Martin Chung    Subject:RE: Non-Urgent Medical Question    Given you changed insurances, we contacted [facility] and they use [facility] for CPAP supplies.  I have  submitted a Durable Medical Equipment (DME) request for them to provide you with the supplies you requested.  Please let me know if you have not been contacted by them within the next 1 week.      Hope your week is off to a great start.    -Dr. Sousa      ----- Message -----       From:Martin Chung       Sent:7/13/2020  1:53 PM PDT         To:Carolyne Sousa D.O.    Subject:Non-Urgent Medical Question    Hi Dr Sousa,    I believe the supplier that I have used before, through [facility], was [employer]. I am currently using the ResMed AirFit-F20 (M) QuietAir Headgear, Frame System and Cushion, including a Medium Size Full Mask that covers both my nose and my mouth. I also like the 6 FT Flex-Lite Tubing 15 MM-Grey along with the Air Filters for my ResMed AirSense 10 Autoset C-PAP Machine. I hope this is helpful and I appreciate all your support!    Thanks!    Chucky Chung      ----- Message -----       From:Carolyne Sousa D.O.       Sent:7/13/2020  1:03 PM PDT         To:Martin Chung    Subject:RE: Non-Urgent Medical Question    Which DME supplier do you get your supplies from?  I need to contact them to get the order form to request refills.  Also, what size mask, type of tubing, size of head gear, etc do you use, so I can submit the refill request.     Thank you,    Dr. Sousa      ----- Message -----       From:Martin Chung       Sent:7/10/2020  8:38 AM PDT         To:Carolyne Sousa D.O.    Subject:Non-Urgent Medical Question    Hi Dr. Sousa,    I will need to order more supplies for my C-PAP Machine such as a mask, head gear, filters, tubing etc. and want to know how to make that happen! Please let me know what the next steps are to facilitate that process.    Thanks,    Chucky Chung-Member # [id number]      ----- Message -----       From:Carolyne Sousa D.O.       Sent:7/8/2020  5:15 PM PDT         To:Salazar Cunneen    Subject:RE: Non-Urgent Medical Question    Good afternoon,    I  have placed a referral for you to see a gastroenterologist regarding irritable bowel symptoms and to discuss colon screening recommendations.  Please contact our referral department in about 3 days to see if it has been approved.  Their phone number is [phone].      I hope you're having a great day,    Dr. Sousa      ----- Message -----       From:Martin Chung       Sent:7/8/2020 10:13 AM PDT         To:Carolyne Sousa D.O.    Subject:Non-Urgent Medical Question    Hello Dr Sousa & Team!    I wanted to set an appointment with a digestive health specialist about irritable bowel syndrome and I am hopeful to get a referral for that disease state in lieu of postponing the colonoscopy until later this year!    Thanks, Chucky Chung-[facility] Member # [id number]

## 2022-10-12 SDOH — ECONOMIC STABILITY: HOUSING INSECURITY: IN THE LAST 12 MONTHS, HOW MANY PLACES HAVE YOU LIVED?: 1

## 2022-10-12 SDOH — ECONOMIC STABILITY: INCOME INSECURITY: IN THE LAST 12 MONTHS, WAS THERE A TIME WHEN YOU WERE NOT ABLE TO PAY THE MORTGAGE OR RENT ON TIME?: NO

## 2022-10-12 SDOH — HEALTH STABILITY: PHYSICAL HEALTH: ON AVERAGE, HOW MANY MINUTES DO YOU ENGAGE IN EXERCISE AT THIS LEVEL?: 60 MIN

## 2022-10-12 SDOH — ECONOMIC STABILITY: FOOD INSECURITY: WITHIN THE PAST 12 MONTHS, YOU WORRIED THAT YOUR FOOD WOULD RUN OUT BEFORE YOU GOT MONEY TO BUY MORE.: NEVER TRUE

## 2022-10-12 SDOH — HEALTH STABILITY: PHYSICAL HEALTH: ON AVERAGE, HOW MANY DAYS PER WEEK DO YOU ENGAGE IN MODERATE TO STRENUOUS EXERCISE (LIKE A BRISK WALK)?: 7 DAYS

## 2022-10-12 SDOH — ECONOMIC STABILITY: FOOD INSECURITY: WITHIN THE PAST 12 MONTHS, THE FOOD YOU BOUGHT JUST DIDN'T LAST AND YOU DIDN'T HAVE MONEY TO GET MORE.: NEVER TRUE

## 2022-10-12 SDOH — ECONOMIC STABILITY: INCOME INSECURITY: HOW HARD IS IT FOR YOU TO PAY FOR THE VERY BASICS LIKE FOOD, HOUSING, MEDICAL CARE, AND HEATING?: NOT HARD AT ALL

## 2022-10-12 SDOH — HEALTH STABILITY: MENTAL HEALTH
STRESS IS WHEN SOMEONE FEELS TENSE, NERVOUS, ANXIOUS, OR CAN'T SLEEP AT NIGHT BECAUSE THEIR MIND IS TROUBLED. HOW STRESSED ARE YOU?: ONLY A LITTLE

## 2022-10-12 ASSESSMENT — SOCIAL DETERMINANTS OF HEALTH (SDOH)
HOW OFTEN DO YOU ATTENT MEETINGS OF THE CLUB OR ORGANIZATION YOU BELONG TO?: MORE THAN 4 TIMES PER YEAR
HOW OFTEN DO YOU GET TOGETHER WITH FRIENDS OR RELATIVES?: MORE THAN THREE TIMES A WEEK
IN A TYPICAL WEEK, HOW MANY TIMES DO YOU TALK ON THE PHONE WITH FAMILY, FRIENDS, OR NEIGHBORS?: MORE THAN THREE TIMES A WEEK
HOW HARD IS IT FOR YOU TO PAY FOR THE VERY BASICS LIKE FOOD, HOUSING, MEDICAL CARE, AND HEATING?: NOT HARD AT ALL
HOW MANY DRINKS CONTAINING ALCOHOL DO YOU HAVE ON A TYPICAL DAY WHEN YOU ARE DRINKING: 1 OR 2
IN A TYPICAL WEEK, HOW MANY TIMES DO YOU TALK ON THE PHONE WITH FAMILY, FRIENDS, OR NEIGHBORS?: MORE THAN THREE TIMES A WEEK
HOW OFTEN DO YOU ATTEND CHURCH OR RELIGIOUS SERVICES?: MORE THAN 4 TIMES PER YEAR
HOW OFTEN DO YOU HAVE SIX OR MORE DRINKS ON ONE OCCASION: NEVER
DO YOU BELONG TO ANY CLUBS OR ORGANIZATIONS SUCH AS CHURCH GROUPS UNIONS, FRATERNAL OR ATHLETIC GROUPS, OR SCHOOL GROUPS?: YES
HOW OFTEN DO YOU ATTEND CHURCH OR RELIGIOUS SERVICES?: MORE THAN 4 TIMES PER YEAR
WITHIN THE PAST 12 MONTHS, YOU WORRIED THAT YOUR FOOD WOULD RUN OUT BEFORE YOU GOT THE MONEY TO BUY MORE: NEVER TRUE
HOW OFTEN DO YOU HAVE A DRINK CONTAINING ALCOHOL: 4 OR MORE TIMES A WEEK
DO YOU BELONG TO ANY CLUBS OR ORGANIZATIONS SUCH AS CHURCH GROUPS UNIONS, FRATERNAL OR ATHLETIC GROUPS, OR SCHOOL GROUPS?: YES
HOW OFTEN DO YOU ATTENT MEETINGS OF THE CLUB OR ORGANIZATION YOU BELONG TO?: MORE THAN 4 TIMES PER YEAR
HOW OFTEN DO YOU GET TOGETHER WITH FRIENDS OR RELATIVES?: MORE THAN THREE TIMES A WEEK

## 2022-10-12 ASSESSMENT — LIFESTYLE VARIABLES
HOW OFTEN DO YOU HAVE SIX OR MORE DRINKS ON ONE OCCASION: NEVER
SKIP TO QUESTIONS 9-10: 1
HOW OFTEN DO YOU HAVE A DRINK CONTAINING ALCOHOL: 4 OR MORE TIMES A WEEK
HOW MANY STANDARD DRINKS CONTAINING ALCOHOL DO YOU HAVE ON A TYPICAL DAY: 1 OR 2
AUDIT-C TOTAL SCORE: 4

## 2022-10-17 ENCOUNTER — HOSPITAL ENCOUNTER (OUTPATIENT)
Dept: LAB | Facility: MEDICAL CENTER | Age: 67
End: 2022-10-17
Attending: FAMILY MEDICINE
Payer: MEDICARE

## 2022-10-17 ENCOUNTER — OFFICE VISIT (OUTPATIENT)
Dept: MEDICAL GROUP | Facility: MEDICAL CENTER | Age: 67
End: 2022-10-17
Payer: MEDICARE

## 2022-10-17 VITALS
TEMPERATURE: 98 F | WEIGHT: 219.58 LBS | HEART RATE: 56 BPM | BODY MASS INDEX: 30.74 KG/M2 | SYSTOLIC BLOOD PRESSURE: 128 MMHG | DIASTOLIC BLOOD PRESSURE: 74 MMHG | OXYGEN SATURATION: 98 % | HEIGHT: 71 IN | RESPIRATION RATE: 20 BRPM

## 2022-10-17 DIAGNOSIS — I10 HYPERTENSION, UNSPECIFIED TYPE: ICD-10-CM

## 2022-10-17 DIAGNOSIS — Z01.84 IMMUNITY STATUS TESTING: ICD-10-CM

## 2022-10-17 DIAGNOSIS — Z12.5 ENCOUNTER FOR SCREENING FOR MALIGNANT NEOPLASM OF PROSTATE: ICD-10-CM

## 2022-10-17 DIAGNOSIS — F90.2 ATTENTION DEFICIT HYPERACTIVITY DISORDER (ADHD), COMBINED TYPE: ICD-10-CM

## 2022-10-17 DIAGNOSIS — R73.03 PREDIABETES: ICD-10-CM

## 2022-10-17 DIAGNOSIS — Z23 NEED FOR VACCINATION: ICD-10-CM

## 2022-10-17 DIAGNOSIS — J45.909 MILD ASTHMA WITHOUT COMPLICATION, UNSPECIFIED WHETHER PERSISTENT: ICD-10-CM

## 2022-10-17 DIAGNOSIS — Z00.00 MEDICARE ANNUAL WELLNESS VISIT, SUBSEQUENT: ICD-10-CM

## 2022-10-17 DIAGNOSIS — E55.9 VITAMIN D DEFICIENCY: ICD-10-CM

## 2022-10-17 DIAGNOSIS — F42.9 OBSESSIVE-COMPULSIVE DISORDER, UNSPECIFIED TYPE: ICD-10-CM

## 2022-10-17 DIAGNOSIS — G47.33 OBSTRUCTIVE SLEEP APNEA: ICD-10-CM

## 2022-10-17 LAB
25(OH)D3 SERPL-MCNC: 46 NG/ML (ref 30–100)
ALBUMIN SERPL BCP-MCNC: 4.2 G/DL (ref 3.2–4.9)
ALBUMIN/GLOB SERPL: 1.8 G/DL
ALP SERPL-CCNC: 86 U/L (ref 30–99)
ALT SERPL-CCNC: 33 U/L (ref 2–50)
ANION GAP SERPL CALC-SCNC: 9 MMOL/L (ref 7–16)
AST SERPL-CCNC: 24 U/L (ref 12–45)
BASOPHILS # BLD AUTO: 1 % (ref 0–1.8)
BASOPHILS # BLD: 0.05 K/UL (ref 0–0.12)
BILIRUB SERPL-MCNC: 0.5 MG/DL (ref 0.1–1.5)
BUN SERPL-MCNC: 12 MG/DL (ref 8–22)
CALCIUM SERPL-MCNC: 9 MG/DL (ref 8.5–10.5)
CHLORIDE SERPL-SCNC: 104 MMOL/L (ref 96–112)
CHOLEST SERPL-MCNC: 164 MG/DL (ref 100–199)
CO2 SERPL-SCNC: 26 MMOL/L (ref 20–33)
CREAT SERPL-MCNC: 0.83 MG/DL (ref 0.5–1.4)
EOSINOPHIL # BLD AUTO: 0.21 K/UL (ref 0–0.51)
EOSINOPHIL NFR BLD: 4 % (ref 0–6.9)
ERYTHROCYTE [DISTWIDTH] IN BLOOD BY AUTOMATED COUNT: 43.3 FL (ref 35.9–50)
EST. AVERAGE GLUCOSE BLD GHB EST-MCNC: 120 MG/DL
FASTING STATUS PATIENT QL REPORTED: NORMAL
GFR SERPLBLD CREATININE-BSD FMLA CKD-EPI: 96 ML/MIN/1.73 M 2
GLOBULIN SER CALC-MCNC: 2.4 G/DL (ref 1.9–3.5)
GLUCOSE SERPL-MCNC: 104 MG/DL (ref 65–99)
HBA1C MFR BLD: 5.8 % (ref 4–5.6)
HBV SURFACE AB SERPL IA-ACNC: <3.5 MIU/ML (ref 0–10)
HCT VFR BLD AUTO: 43.8 % (ref 42–52)
HDLC SERPL-MCNC: 40 MG/DL
HGB BLD-MCNC: 14.7 G/DL (ref 14–18)
IMM GRANULOCYTES # BLD AUTO: 0.03 K/UL (ref 0–0.11)
IMM GRANULOCYTES NFR BLD AUTO: 0.6 % (ref 0–0.9)
LDLC SERPL CALC-MCNC: 95 MG/DL
LYMPHOCYTES # BLD AUTO: 1.14 K/UL (ref 1–4.8)
LYMPHOCYTES NFR BLD: 21.7 % (ref 22–41)
MCH RBC QN AUTO: 31.1 PG (ref 27–33)
MCHC RBC AUTO-ENTMCNC: 33.6 G/DL (ref 33.7–35.3)
MCV RBC AUTO: 92.6 FL (ref 81.4–97.8)
MONOCYTES # BLD AUTO: 0.56 K/UL (ref 0–0.85)
MONOCYTES NFR BLD AUTO: 10.7 % (ref 0–13.4)
NEUTROPHILS # BLD AUTO: 3.26 K/UL (ref 1.82–7.42)
NEUTROPHILS NFR BLD: 62 % (ref 44–72)
NRBC # BLD AUTO: 0 K/UL
NRBC BLD-RTO: 0 /100 WBC
PLATELET # BLD AUTO: 265 K/UL (ref 164–446)
PMV BLD AUTO: 9.7 FL (ref 9–12.9)
POTASSIUM SERPL-SCNC: 4.7 MMOL/L (ref 3.6–5.5)
PROT SERPL-MCNC: 6.6 G/DL (ref 6–8.2)
PSA SERPL-MCNC: 1.28 NG/ML (ref 0–4)
RBC # BLD AUTO: 4.73 M/UL (ref 4.7–6.1)
SODIUM SERPL-SCNC: 139 MMOL/L (ref 135–145)
TRIGL SERPL-MCNC: 144 MG/DL (ref 0–149)
WBC # BLD AUTO: 5.3 K/UL (ref 4.8–10.8)

## 2022-10-17 PROCEDURE — G0008 ADMIN INFLUENZA VIRUS VAC: HCPCS | Performed by: FAMILY MEDICINE

## 2022-10-17 PROCEDURE — 83036 HEMOGLOBIN GLYCOSYLATED A1C: CPT

## 2022-10-17 PROCEDURE — G0009 ADMIN PNEUMOCOCCAL VACCINE: HCPCS | Performed by: FAMILY MEDICINE

## 2022-10-17 PROCEDURE — 90662 IIV NO PRSV INCREASED AG IM: CPT | Performed by: FAMILY MEDICINE

## 2022-10-17 PROCEDURE — 36415 COLL VENOUS BLD VENIPUNCTURE: CPT

## 2022-10-17 PROCEDURE — 80053 COMPREHEN METABOLIC PANEL: CPT

## 2022-10-17 PROCEDURE — 85025 COMPLETE CBC W/AUTO DIFF WBC: CPT

## 2022-10-17 PROCEDURE — 90677 PCV20 VACCINE IM: CPT | Performed by: FAMILY MEDICINE

## 2022-10-17 PROCEDURE — 86706 HEP B SURFACE ANTIBODY: CPT

## 2022-10-17 PROCEDURE — G0439 PPPS, SUBSEQ VISIT: HCPCS | Performed by: FAMILY MEDICINE

## 2022-10-17 PROCEDURE — 82306 VITAMIN D 25 HYDROXY: CPT

## 2022-10-17 PROCEDURE — 84153 ASSAY OF PSA TOTAL: CPT

## 2022-10-17 PROCEDURE — 80061 LIPID PANEL: CPT

## 2022-10-17 RX ORDER — AMLODIPINE BESYLATE 2.5 MG/1
2.5 TABLET ORAL
Qty: 90 TABLET | Refills: 2 | Status: SHIPPED | OUTPATIENT
Start: 2022-10-17 | End: 2023-01-30 | Stop reason: SDUPTHER

## 2022-10-17 RX ORDER — LISINOPRIL 40 MG/1
40 TABLET ORAL
Qty: 100 TABLET | Refills: 2 | Status: SHIPPED | OUTPATIENT
Start: 2022-10-17 | End: 2023-10-13

## 2022-10-17 RX ORDER — FLUOXETINE HYDROCHLORIDE 40 MG/1
40 CAPSULE ORAL
Qty: 90 CAPSULE | Refills: 2 | Status: SHIPPED | OUTPATIENT
Start: 2022-10-17 | End: 2023-10-02

## 2022-10-17 RX ORDER — ALBUTEROL SULFATE 90 UG/1
2 AEROSOL, METERED RESPIRATORY (INHALATION) EVERY 4 HOURS PRN
Qty: 8.5 G | Refills: 1 | Status: SHIPPED | OUTPATIENT
Start: 2022-10-17 | End: 2023-04-10

## 2022-10-17 ASSESSMENT — FIBROSIS 4 INDEX: FIB4 SCORE: 1.09

## 2022-10-17 ASSESSMENT — ENCOUNTER SYMPTOMS: GENERAL WELL-BEING: GOOD

## 2022-10-17 ASSESSMENT — PATIENT HEALTH QUESTIONNAIRE - PHQ9: CLINICAL INTERPRETATION OF PHQ2 SCORE: 0

## 2022-10-17 ASSESSMENT — ACTIVITIES OF DAILY LIVING (ADL): BATHING_REQUIRES_ASSISTANCE: 0

## 2022-10-17 NOTE — NON-PROVIDER
1. Medicare annual wellness visit, subsequent  HM     2. Need for vaccination  Pt desires vaccination.  Risks and benefits discussed.  No contraindications today.  Vaccine given.  Follow-up precautions discussed.  - Pneumococcal Conjugate Vaccine 20-Valent (19 yrs+)  - INFLUENZA VACCINE, HIGH DOSE (65+ ONLY)     3. Vitamin D deficiency  Chronic, asymptomatic.  Monitoring labs ordered.  Will update supplement based on results.  - VITAMIN D,25 HYDROXY (DEFICIENCY); Future     4. Prediabetes  Chronic, asymptomatic.  Dietary and exercise guidance given.  Labs ordered.  - Comp Metabolic Panel; Future  - Lipid Profile; Future  - CBC WITH DIFFERENTIAL; Future  - HEMOGLOBIN A1C; Future     5. Encounter for screening for malignant neoplasm of prostate  Asymptomatic. Labs ordered.  - PROSTATE SPECIFIC AG SCREENING; Future     6. Hypertension, unspecified type  Chronic, well controlled, asymptomatic.  Dietary and exercise guidance given.  Continue current medications.  Labs ordered. Follow up precautions given.   - Comp Metabolic Panel; Future  - Lipid Profile; Future  - amLODIPine (NORVASC) 2.5 MG Tab; Take 1 Tablet by mouth every day.  Dispense: 90 Tablet; Refill: 2  - lisinopril (PRINIVIL) 40 MG tablet; Take 1 Tablet by mouth every day.  Dispense: 100 Tablet; Refill: 2     7. Immunity status testing  Patient is requesting immunity status testing. Labs ordered. Follow up precautions given.   - HEP B SURFACE AB; Future     8. Attention deficit hyperactivity disorder (ADHD), combined type  Chronic, well controlled with Prozac 40 mg. No negative SE reported. Continue current medication routine. Denies SI/HI, PHQ2-0. Follow up precautions given.  - fluoxetine (PROZAC) 40 MG capsule; Take 1 Capsule by mouth every day.  Dispense: 90 Capsule; Refill: 2     9. Obstructive sleep apnea  Chronic, well controlled with nightly CPAP use. Continue current routine. Follow up precautions given.     10. Mild asthma without complication,  unspecified whether persistent  Chronic, patient not currently using his Albuterol during episodes of mild wheezing while walking that self resolves after a few minutes. He does have an inhaler but leaves it at home while going out. He was encouraged to take the inhaler and use it appropriately during episodes for symptom management. A refill RX was sent to his pharmacy. Follow up precautions given.  - albuterol (VENTOLIN HFA) 108 (90 Base) MCG/ACT Aero Soln inhalation aerosol; Inhale 2 Puffs every four hours as needed for Shortness of Breath.  Dispense: 8.5 g; Refill: 1     11. Obsessive-compulsive disorder, unspecified type  Chronic, well controlled with Prozac. No negative SE reported. Continue current medication routine. Denies SI/HI, PHQ2-0. Follow up precautions given.   - fluoxetine (PROZAC) 40 MG capsule; Take 1 Capsule by mouth every day.  Dispense: 90 Capsule; Refill: 2           Services suggested: No services needed at this time  Health Care Screening recommendations as per orders if indicated.  Referrals offered: PT/OT/Nutrition counseling/Behavioral Health/Smoking cessation as per orders if indicated.     Discussion today about general wellness and lifestyle habits:    Prevent falls and reduce trip hazards; Cautioned about securing or removing rugs.  Have a working fire alarm and carbon monoxide detector;   Engage in regular physical activity and social activities         Follow-up: No follow-ups on file.

## 2022-10-17 NOTE — PROGRESS NOTES
Chief Complaint   Patient presents with    Annual Exam         HPI:  Chucky is a 67 y.o. here for Medicare Annual Wellness Visit    HTN (hypertension)  Home Bps have been 140/70s.  No chest pain, palpitations, SOB, headaches or dizziness.    Obstructive sleep apnea  Uses CPAP as recommended.    Asthma  Chronic issue, not needing to use his albuterol at all but only has mild wheezing with walking up hill quickly.  Symptoms resolve with rest and walking on flat ground.      Patient Active Problem List    Diagnosis Date Noted    Prediabetes 02/22/2022    BMI 30.0-30.9,adult 02/22/2022    Obesity (BMI 30.0-34.9) 02/22/2022    Nonspecific abnormal function study, cardiovascular 02/22/2022    Onychomycosis 02/22/2022    Vitamin D deficiency 10/11/2021    Nevus 04/14/2021    Incomplete right bundle branch block (RBBB) determined by electrocardiography 10/14/2020    Other chest pain 10/14/2020    Other irritable bowel syndrome 07/08/2020    AK (actinic keratosis) 06/30/2020    History of squamous cell carcinoma of skin 06/24/2020    Overweight 06/24/2020    Skin lump of leg, right 06/24/2020    Asthma 02/24/2020    Obstructive sleep apnea 02/06/2019    HTN (hypertension) 01/24/2019    Attention deficit hyperactivity disorder (ADHD), combined type 07/31/2012    Non-toxic multinodular goiter 04/03/2008    Obsessive compulsive disorder 04/03/2008       Current Outpatient Medications   Medication Sig Dispense Refill    albuterol (VENTOLIN HFA) 108 (90 Base) MCG/ACT Aero Soln inhalation aerosol Inhale 2 Puffs every four hours as needed for Shortness of Breath. 8.5 g 1    amLODIPine (NORVASC) 2.5 MG Tab Take 1 Tablet by mouth every day. 90 Tablet 2    fluoxetine (PROZAC) 40 MG capsule Take 1 Capsule by mouth every day. 90 Capsule 2    lisinopril (PRINIVIL) 40 MG tablet Take 1 Tablet by mouth every day. 100 Tablet 2    Loperamide HCl (IMODIUM PO) Take  by mouth.      cetirizine (ZYRTEC) 10 MG Tab Take 10 mg by mouth every day.       aspirin 81 MG tablet Take 81 mg by mouth every day.      Multiple Vitamins-Minerals (MULTIVITAMIN ADULT PO) Take 1 Tab by mouth every day.      Vitamin D, Ergocalciferol, 50 MCG (2000 UT) Cap Take 3 Tabs by mouth every day.      DHA-EPA-Vitamin E (OMEGA-3 COMPLEX PO) Take 1 tablet by mouth every day.      Saw Palmetto, Serenoa repens, (SAW PALMETTO PO) Take 320 mg by mouth every day.       No current facility-administered medications for this visit.        Patient is taking medications as noted in medication list.  Current supplements as per medication list.     Allergies: Patient has no known allergies.    Current social contact/activities: Pt stays in touch with family and friends. Pt engages in community events, festivals and attends [employer]. Pt enjoys traveling with his wife and gathers with family.     Is patient current with immunizations? No, due for FLU and PREVNAR (PCV13) . Patient is interested in receiving FLU and PNEUMOVAX (PPSV23) today.    He  reports that he has never smoked. He has never used smokeless tobacco. He reports current alcohol use. He reports that he does not currently use drugs.  Counseling given: Not Answered      ROS:    Gait: Uses no assistive device   Ostomy: No   Other tubes: No   Amputations: No   Chronic oxygen use No   Last eye exam 1 years   Wears hearing aids: No   : Denies any urinary leakage during the last 6 months        Depression Screening  Little interest or pleasure in doing things?  0 - not at all  Feeling down, depressed, or hopeless? 0 - not at all  Patient Health Questionnaire Score: 0    If depressive symptoms identified deferred to follow up visit unless specifically addressed in assessment and plan.    Interpretation of PHQ-9 Total Score   Score Severity   1-4 No Depression   5-9 Mild Depression   10-14 Moderate Depression   15-19 Moderately Severe Depression   20-27 Severe Depression    Screening for Cognitive Impairment  Three Minute Recall (daughter, heaven,  abhinav)  3/3    Obey clock face with all 12 numbers and set the hands to show 10 past 11.  Yes 5/5  If cognitive concerns identified, deferred for follow up unless specifically addressed in assessment and plan.    Fall Risk Assessment  Has the patient had two or more falls in the last year or any fall with injury in the last year?  No  If fall risk identified, deferred for follow up unless specifically addressed in assessment and plan.    Safety Assessment  Throw rugs on floor.  Yes  Handrails on all stairs.  Yes  Good lighting in all hallways.  Yes  Difficulty hearing.  No  Patient counseled about all safety risks that were identified.    Functional Assessment ADLs  Are there any barriers preventing you from cooking for yourself or meeting nutritional needs?  No.    Are there any barriers preventing you from driving safely or obtaining transportation?  No.    Are there any barriers preventing you from using a telephone or calling for help?  No.    Are there any barriers preventing you from shopping?  No.    Are there any barriers preventing you from taking care of your own finances?  No.    Are there any barriers preventing you from managing your medications?  No.    Are there any barriers preventing you from showering, bathing or dressing yourself?  No.    Are you currently engaging in any exercise or physical activity?  Yes.  Pt. Walks for at least 1 hr a day, pt exercises daily.  Pt takes care of his home.  What is your perception of your health?  Good.    Advance Care Planning  Do you have an Advance Directive, Living Will, Durable Power of , or POLST? Yes.    Health Maintenance Summary            Overdue - IMM HEP B VACCINE (1 of 3 - 3-dose series) Overdue - never done      No completion history exists for this topic.              Ordered - IMM PNEUMOCOCCAL VACCINE: 65+ Years (2 - PCV) Ordered on 10/17/2022      07/01/2020  Imm Admin: Pneumococcal polysaccharide vaccine (PPSV-23)               Overdue - COVID-19 Vaccine (4 - Booster for Pfizer series) Overdue since 3/1/2022      2021  Imm Admin: PFIZER PURPLE CAP SARS-COV-2 VACCINATION (12+)    2021  Imm Admin: PFIZER PURPLE CAP SARS-COV-2 VACCINATION (12+)    2021  Imm Admin: PFIZER PURPLE CAP SARS-COV-2 VACCINATION (12+)              Ordered - IMM INFLUENZA (1) Ordered on 10/17/2022      10/11/2021  Imm Admin: Influenza Vaccine Adult HD    2020  Imm Admin: Influenza Vaccine Quad Inj (Pf)    2019  Imm Admin: Influenza Vaccine Quad Inj (Pf)    10/12/2018  Imm Admin: Influenza Vaccine Quad Inj (Pf)    10/23/2017  Imm Admin: Influenza Seasonal Injectable - Historical Data    Only the first 5 history entries have been loaded, but more history exists.              Annual Wellness Visit (Every 366 Days) Next due on 10/18/2023      10/17/2022  Visit Dx: Medicare annual wellness visit, subsequent    2022  Level of Service: ANNUAL WELLNESS VISIT-INCLUDES PPPS SUBSEQUE*    10/11/2021  Visit Dx: Medicare annual wellness visit, initial              COLORECTAL CANCER SCREENING (COLONOSCOPY - Every 10 Years) Next due on 9/10/2030      09/10/2020  REFERRAL TO GI FOR COLONOSCOPY    09/10/2020  REFERRAL TO GI FOR COLONOSCOPY              IMM DTaP/Tdap/Td Vaccine (3 - Td or Tdap) Next due on 2030  Imm Admin: Tdap Vaccine    2010  Imm Admin: Tdap Vaccine    11/10/2002  Imm Admin: TD Vaccine    10/22/1997  Imm Admin: TD Vaccine              IMM ZOSTER VACCINES (Series Information) Completed      2020  Imm Admin: Zoster Vaccine Recombinant (RZV) (SHINGRIX)    10/16/2019  Imm Admin: Zoster Vaccine Recombinant (RZV) (SHINGRIX)    2013  Imm Admin: Zoster Vaccine Live (ZVL) (Zostavax) - HISTORICAL DATA              HEPATITIS C SCREENING  Completed      2020  HCV Scrn ( [id number] 1xLife)              IMM MENINGOCOCCAL ACWY VACCINE (Series Information) Aged Out      No completion history exists  "for this topic.                    Patient Care Team:  Carolyne Sousa D.O. as PCP - General (Family Medicine)  [facility] as [facility]   Preferred Home Care    Social History     Tobacco Use    Smoking status: Never    Smokeless tobacco: Never   Vaping Use    Vaping Use: Never used   Substance Use Topics    Alcohol use: Yes     Comment: 10 drinks/week    Drug use: Not Currently     Family History   Problem Relation Age of Onset    Cancer Mother         breast cancer    Hypertension Mother     Stroke Father     Cancer Other         prostate cancer    Psychiatric Illness Other         depression    Migraines Other     Diabetes Neg Hx     Hyperlipidemia Neg Hx      He  has a past medical history of Chickenpox, Egyptian measles, Mumps, and Sleep apnea.   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    Hearing excellent.    Alert, oriented in no acute distress.  HEENT: Right ear cerumen impacted, left TM normal  Eye contact is good, speech goal directed, affect calm  CV: Regular rate and rhythm  Lungs: CTABL  Psych Normal affect and mood.  Linear thought process, good insight, well groomed. No SI/HI.  PHQ2 =0.    Assessment and Plan. The following treatment and monitoring plan is recommended:      1. Medicare annual wellness visit, subsequent  HM    2. Need for vaccination  Pt desires vaccination.  Risks and benefits discussed.  No contraindications today.  Vaccine given.  Follow-up precautions discussed.  - Pneumococcal Conjugate Vaccine 20-Valent (19 yrs+)  - INFLUENZA VACCINE, HIGH DOSE (65+ ONLY)    3. Vitamin D deficiency  Chronic, " asymptomatic.  Moniotring labs ordered.  Will update supplement based on results.  - VITAMIN D,25 HYDROXY (DEFICIENCY); Future    4. Prediabetes  Chronic, asymptomatic.  Dietary and exercise guidance given.  Labs ordered.  - Comp Metabolic Panel; Future  - Lipid Profile; Future  - CBC WITH DIFFERENTIAL; Future  - HEMOGLOBIN A1C; Future    5. Encounter for screening for malignant neoplasm of prostate  Asymptomatic.  Labs ordered.  - PROSTATE SPECIFIC AG SCREENING; Future    6. Hypertension, unspecified type  Chronic, well controlled, asymptomatic.  Dietary and exercise guidance given.  Continue current medications.  Labs ordered.  - Comp Metabolic Panel; Future  - Lipid Profile; Future  - amLODIPine (NORVASC) 2.5 MG Tab; Take 1 Tablet by mouth every day.  Dispense: 90 Tablet; Refill: 2  - lisinopril (PRINIVIL) 40 MG tablet; Take 1 Tablet by mouth every day.  Dispense: 100 Tablet; Refill: 2     7. Immunity status testing  Patient is requesting immunity status testing. Labs ordered. Follow up precautions given.   - HEP B SURFACE AB; Future     8. Attention deficit hyperactivity disorder (ADHD), combined type  Chronic, well controlled with Prozac 40 mg. No negative SE reported. Continue current medication routine. Denies SI/HI, PHQ2-0. Follow up precautions given.  - fluoxetine (PROZAC) 40 MG capsule; Take 1 Capsule by mouth every day.  Dispense: 90 Capsule; Refill: 2     9. Obstructive sleep apnea  Chronic, well controlled with nightly CPAP use. Continue current routine. Follow up precautions given.     10. Mild asthma without complication, unspecified whether persistent  Chronic, patient not currently using his Albuterol during episodes of mild wheezing while walking that self resolves after a few minutes. He does have an inhaler but leaves it at home while going out. He was encouraged to take the inhaler and use it appropriately during episodes for symptom management. A refill RX was sent to his pharmacy. Follow up  precautions given.  - albuterol (VENTOLIN HFA) 108 (90 Base) MCG/ACT Aero Soln inhalation aerosol; Inhale 2 Puffs every four hours as needed for Shortness of Breath.  Dispense: 8.5 g; Refill: 1     11. Obsessive-compulsive disorder, unspecified type  Chronic, well controlled with Prozac. No negative SE reported. Continue current medication routine. Denies SI/HI, PHQ2-0. Follow up precautions given.   - fluoxetine       Services suggested: No services needed at this time  Health Care Screening recommendations as per orders if indicated.  Referrals offered: PT/OT/Nutrition counseling/Behavioral Health/Smoking cessation as per orders if indicated.    Discussion today about general wellness and lifestyle habits:    Prevent falls and reduce trip hazards; Cautioned about securing or removing rugs.  Have a working fire alarm and carbon monoxide detector;   Engage in regular physical activity and social activities       Follow-up: Return in about 6 months (around 4/17/2023) for medication review but earlier appt (next available for ear wax removal on right).

## 2022-10-17 NOTE — ASSESSMENT & PLAN NOTE
Chronic issue, not needing to use his albuterol at all but only has mild wheezing with walking up hill quickly.  Symptoms resolve with rest and walking on flat ground.

## 2022-11-02 ENCOUNTER — PATIENT MESSAGE (OUTPATIENT)
Dept: MEDICAL GROUP | Facility: MEDICAL CENTER | Age: 67
End: 2022-11-02
Payer: MEDICARE

## 2022-11-02 DIAGNOSIS — Z01.84 IMMUNITY STATUS TESTING: ICD-10-CM

## 2022-11-04 ENCOUNTER — HOSPITAL ENCOUNTER (OUTPATIENT)
Dept: LAB | Facility: MEDICAL CENTER | Age: 67
End: 2022-11-04
Attending: FAMILY MEDICINE
Payer: MEDICARE

## 2022-11-04 DIAGNOSIS — Z01.84 IMMUNITY STATUS TESTING: ICD-10-CM

## 2022-11-04 PROCEDURE — 36415 COLL VENOUS BLD VENIPUNCTURE: CPT

## 2022-11-04 PROCEDURE — 86709 HEPATITIS A IGM ANTIBODY: CPT

## 2022-11-04 PROCEDURE — 86708 HEPATITIS A ANTIBODY: CPT

## 2022-11-07 LAB
HAV AB SER QL IA: NEGATIVE
HAV IGM SERPL QL IA: NEGATIVE

## 2022-11-21 ENCOUNTER — PHARMACY VISIT (OUTPATIENT)
Dept: PHARMACY | Facility: MEDICAL CENTER | Age: 67
End: 2022-11-21
Payer: COMMERCIAL

## 2022-11-21 PROCEDURE — RXMED WILLOW AMBULATORY MEDICATION CHARGE: Performed by: INTERNAL MEDICINE

## 2022-11-21 RX ORDER — HEPATITIS A AND HEPATITIS B (RECOMBINANT) VACCINE 720; 20 [IU]/ML; UG/ML
INJECTION, SUSPENSION INTRAMUSCULAR
Qty: 0.5 ML | Refills: 0 | Status: SHIPPED | OUTPATIENT
Start: 2022-11-21 | End: 2022-12-19 | Stop reason: SDUPTHER

## 2022-11-22 ENCOUNTER — OFFICE VISIT (OUTPATIENT)
Dept: MEDICAL GROUP | Facility: MEDICAL CENTER | Age: 67
End: 2022-11-22
Payer: MEDICARE

## 2022-11-22 VITALS
RESPIRATION RATE: 12 BRPM | HEART RATE: 55 BPM | BODY MASS INDEX: 31.08 KG/M2 | DIASTOLIC BLOOD PRESSURE: 74 MMHG | SYSTOLIC BLOOD PRESSURE: 130 MMHG | OXYGEN SATURATION: 98 % | TEMPERATURE: 97.7 F | WEIGHT: 222 LBS | HEIGHT: 71 IN

## 2022-11-22 DIAGNOSIS — H61.21 IMPACTED CERUMEN OF RIGHT EAR: ICD-10-CM

## 2022-11-22 PROCEDURE — 99213 OFFICE O/P EST LOW 20 MIN: CPT | Performed by: STUDENT IN AN ORGANIZED HEALTH CARE EDUCATION/TRAINING PROGRAM

## 2022-11-22 ASSESSMENT — ENCOUNTER SYMPTOMS
ABDOMINAL PAIN: 0
NAUSEA: 0
COUGH: 0
FOCAL WEAKNESS: 0
SHORTNESS OF BREATH: 0
FEVER: 0
CHILLS: 0
VOMITING: 0

## 2022-11-22 ASSESSMENT — FIBROSIS 4 INDEX: FIB4 SCORE: 1.04

## 2022-11-22 NOTE — PROGRESS NOTES
"Subjective:     CC: right ear earwax removal    HPI:   Martin presents today with    Problem   Impacted Cerumen of Right Ear    Patient reports right ear wax buildup.  Denies ear pain, discharge, fever, chills         Health Maintenance: Completed    ROS:  Review of Systems   Constitutional:  Negative for chills and fever.   HENT:  Negative for ear discharge, ear pain, hearing loss and tinnitus.    Respiratory:  Negative for cough and shortness of breath.    Cardiovascular:  Negative for chest pain and leg swelling.   Gastrointestinal:  Negative for abdominal pain, nausea and vomiting.   Neurological:  Negative for focal weakness.     Objective:     Exam:  /74 (BP Location: Right arm, Patient Position: Sitting, BP Cuff Size: Adult)   Pulse (!) 55   Temp 36.5 °C (97.7 °F) (Temporal)   Resp 12   Ht 1.803 m (5' 11\")   Wt 101 kg (222 lb)   SpO2 98%   BMI 30.96 kg/m²  Body mass index is 30.96 kg/m².    Physical Exam  Vitals reviewed.   HENT:      Head: Normocephalic.      Mouth/Throat:      Mouth: Mucous membranes are moist.      Pharynx: Oropharynx is clear.   Eyes:      Pupils: Pupils are equal, round, and reactive to light.   Cardiovascular:      Rate and Rhythm: Normal rate and regular rhythm.   Pulmonary:      Effort: Pulmonary effort is normal. No respiratory distress.      Breath sounds: No wheezing or rales.   Abdominal:      General: Abdomen is flat. Bowel sounds are normal. There is no distension.      Tenderness: There is no abdominal tenderness. There is no guarding.   Skin:     General: Skin is warm.   Neurological:      General: No focal deficit present.      Mental Status: He is alert and oriented to person, place, and time.     Assessment & Plan:     67 y.o. male with the following -     1. Impacted cerumen of right ear  Removed.      Return if symptoms worsen or fail to improve. Patient will follow up with Dr. Sousa    Please note that this dictation was created using voice recognition " software. I have made every reasonable attempt to correct obvious errors, but I expect that there are errors of grammar and possibly content that I did not discover before finalizing the note.

## 2022-12-19 ENCOUNTER — PHARMACY VISIT (OUTPATIENT)
Dept: PHARMACY | Facility: MEDICAL CENTER | Age: 67
End: 2022-12-19
Payer: COMMERCIAL

## 2022-12-19 PROCEDURE — RXMED WILLOW AMBULATORY MEDICATION CHARGE: Performed by: INTERNAL MEDICINE

## 2022-12-19 RX ORDER — HEPATITIS A AND HEPATITIS B (RECOMBINANT) VACCINE 720; 20 [IU]/ML; UG/ML
INJECTION, SUSPENSION INTRAMUSCULAR
Qty: 1 ML | Refills: 0 | Status: SHIPPED | OUTPATIENT
Start: 2022-12-19 | End: 2023-02-15

## 2023-01-23 ENCOUNTER — TELEPHONE (OUTPATIENT)
Dept: HEALTH INFORMATION MANAGEMENT | Facility: OTHER | Age: 68
End: 2023-01-23
Payer: MEDICARE

## 2023-01-30 ENCOUNTER — OFFICE VISIT (OUTPATIENT)
Dept: CARDIOLOGY | Facility: MEDICAL CENTER | Age: 68
End: 2023-01-30
Payer: MEDICARE

## 2023-01-30 VITALS
OXYGEN SATURATION: 97 % | HEART RATE: 56 BPM | SYSTOLIC BLOOD PRESSURE: 124 MMHG | WEIGHT: 223 LBS | DIASTOLIC BLOOD PRESSURE: 70 MMHG | HEIGHT: 71 IN | RESPIRATION RATE: 16 BRPM | BODY MASS INDEX: 31.22 KG/M2

## 2023-01-30 DIAGNOSIS — E78.5 DYSLIPIDEMIA: ICD-10-CM

## 2023-01-30 DIAGNOSIS — I10 PRIMARY HYPERTENSION: ICD-10-CM

## 2023-01-30 DIAGNOSIS — M79.604 PAIN IN BOTH LOWER EXTREMITIES: ICD-10-CM

## 2023-01-30 DIAGNOSIS — M79.605 PAIN IN BOTH LOWER EXTREMITIES: ICD-10-CM

## 2023-01-30 DIAGNOSIS — I45.10 INCOMPLETE RBBB: ICD-10-CM

## 2023-01-30 PROCEDURE — 99214 OFFICE O/P EST MOD 30 MIN: CPT | Performed by: INTERNAL MEDICINE

## 2023-01-30 PROCEDURE — 93000 ELECTROCARDIOGRAM COMPLETE: CPT | Performed by: INTERNAL MEDICINE

## 2023-01-30 RX ORDER — AMLODIPINE BESYLATE 5 MG/1
5 TABLET ORAL
Qty: 100 TABLET | Refills: 3 | Status: SHIPPED | OUTPATIENT
Start: 2023-01-30 | End: 2023-05-03

## 2023-01-30 ASSESSMENT — FIBROSIS 4 INDEX: FIB4 SCORE: 1.04

## 2023-01-30 NOTE — PROGRESS NOTES
Cardiology Follow-up Consultation Note    Date of note:    1/30/2023  Primary Care Provider: Carolyne Sousa D.O.    Name:             Martin Chung III   YOB: 1955  MRN:               [id number]    Chief Complaint   Patient presents with    Other     F/v Dx:Incomplete right bundle branch block (RBBB) determined by electrocardiography    Hypertension    Chest Pain       HISTORY OF PRESENT ILLNESS  Mr. Martin Chung III is a 67 y.o. male who returns to see us for follow-up of iRBBB and HTN    Pertinent history:  Incomplete RBBB, incidental finding on ECG  FLO on CPAP  Essential hypertension    Last clinic visit: 10/26/2020    Interim History:  Since his last visit, has been doing well from a cardiovascular perspective.  Reports well-controlled blood pressure.  Has noticed mild leg aching with exertion.  Is trying to be physically active to reduce risk of future cardiovascular events.      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 100 Tablet 3    albuterol (VENTOLIN HFA) 108 (90 Base) MCG/ACT Aero Soln inhalation aerosol Inhale 2 Puffs every four hours as needed for Shortness of Breath. 8.5 g 1    fluoxetine (PROZAC) 40 MG capsule Take 1 Capsule by mouth every day. 90 Capsule 2    lisinopril (PRINIVIL) 40 MG tablet Take 1 Tablet by mouth every day. 100 Tablet 2    Loperamide HCl (IMODIUM PO) Take  by mouth.      cetirizine (ZYRTEC) 10 MG Tab Take 10 mg by mouth every day.      aspirin 81 MG tablet Take 81 mg by mouth every day.      Multiple Vitamins-Minerals (MULTIVITAMIN ADULT PO) Take 1 Tab by mouth every day.      Vitamin D, Ergocalciferol, 50 MCG (2000 UT) Cap  Take 3 Tabs by mouth every day.      DHA-EPA-Vitamin E (OMEGA-3 COMPLEX PO) Take 1 tablet by mouth every day.      Saw Palmetto, Serenoa repens, (SAW PALMETTO PO) Take 320 mg by mouth every day.      hepatitis A-hepatitis B (TWINRIX) 720-20 ELU-MCG/ML injection Inject  into the shoulder, thigh, or buttocks. (Patient not taking: Reported on 1/30/2023) 1 mL 0     No current facility-administered medications for this visit.         No Known Allergies      Family History   Problem Relation Age of Onset    Cancer Mother         breast cancer    Hypertension Mother     Stroke Father     Cancer Other         prostate cancer    Psychiatric Illness Other         depression    Migraines Other     Diabetes Neg Hx     Hyperlipidemia Neg Hx          Social History     Socioeconomic History    Marital status:      Spouse name: Not on file    Number of children: Not on file    Years of education: Not on file    Highest education level: Bachelor's degree (e.g., BA, AB, BS)   Occupational History    Not on file   Tobacco Use    Smoking status: Never    Smokeless tobacco: Never   Vaping Use    Vaping Use: Never used   Substance and Sexual Activity    Alcohol use: Yes     Comment: Under 10 drinks/week    Drug use: Not Currently    Sexual activity: Yes     Partners: Female   Other Topics Concern    Not on file   Social History Narrative    Not on file     Social Determinants of Health     Financial Resource Strain: Low Risk     Difficulty of Paying Living Expenses: Not hard at all   Food Insecurity: No Food Insecurity    Worried About Running Out of Food in the Last Year: Never true    Ran Out of Food in the Last Year: Never true   Transportation Needs: No Transportation Needs    Lack of Transportation (Medical): No    Lack of Transportation (Non-Medical): No   Physical Activity: Sufficiently Active    Days of Exercise per Week: 7 days    Minutes of Exercise per Session: 60 min   Stress: No Stress Concern Present    Feeling of  "Stress : Only a little   Social Connections: Socially Integrated    Frequency of Communication with Friends and Family: More than three times a week    Frequency of Social Gatherings with Friends and Family: More than three times a week    Attends [employer] Services: More than 4 times per year    Active Member of Clubs or Organizations: Yes    Attends Club or Organization Meetings: More than 4 times per year    Marital Status:    Intimate Partner Violence: Not on file   Housing Stability: Low Risk     Unable to Pay for Housing in the Last Year: No    Number of Places Lived in the Last Year: 1    Unstable Housing in the Last Year: No         Physical Exam:  Ambulatory Vitals  /70 (BP Location: Left arm, Patient Position: Sitting, BP Cuff Size: Adult)   Pulse (!) 56   Resp 16   Ht 1.803 m (5' 11\")   Wt 101 kg (223 lb)   SpO2 97%    Oxygen Therapy:     BP Readings from Last 4 Encounters:   01/30/23 124/70   11/22/22 130/74   10/17/22 128/74   02/22/22 142/84       Weight/BMI: Body mass index is 31.1 kg/m².  Wt Readings from Last 4 Encounters:   01/30/23 101 kg (223 lb)   11/22/22 101 kg (222 lb)   10/17/22 99.6 kg (219 lb 9.3 oz)   02/22/22 98 kg (216 lb)       GEN: Well developed, well nourished and in no acute distress.  HEART: no significant JVD, regular rate and rhythm, normal S1 and S2, no murmurs, no third heart sounds, normal cardiac palpation  LUNG: clear to auscultation bilaterally, no wheezing, no crackles, normal respiratory effort on room air  ABDOMEN: soft, non-tender, non-distended, normal bowel sounds throughout  EXTREMITIES: no peripheral edema noted  VASCULAR: no significantly elevated jugular venous pressure, no carotid bruits noted, radial pulses 2+ and equal      Lab Data Review:  Lab Results   Component Value Date/Time    CHOLSTRLTOT 164 10/17/2022 09:34 AM    LDL 95 10/17/2022 09:34 AM    HDL 40 10/17/2022 09:34 AM    TRIGLYCERIDE 144 10/17/2022 09:34 AM       Lab Results "   Component Value Date/Time    SODIUM 139 10/17/2022 09:34 AM    POTASSIUM 4.7 10/17/2022 09:34 AM    CHLORIDE 104 10/17/2022 09:34 AM    CO2 26 10/17/2022 09:34 AM    GLUCOSE 104 (H) 10/17/2022 09:34 AM    BUN 12 10/17/2022 09:34 AM    CREATININE 0.83 10/17/2022 09:34 AM     Lab Results   Component Value Date/Time    ALKPHOSPHAT 86 10/17/2022 09:34 AM    ASTSGOT 24 10/17/2022 09:34 AM    ALTSGPT 33 10/17/2022 09:34 AM    TBILIRUBIN 0.5 10/17/2022 09:34 AM      Lab Results   Component Value Date/Time    WBC 5.3 10/17/2022 09:34 AM     Lab Results   Component Value Date/Time    HBA1C 5.8 (H) 10/17/2022 09:34 AM    HBA1C 5.4 10/11/2021 10:51 AM       Cardiac Imaging and Procedures Review:    EKG dated 1/30/2023: My personal interpretation is sinus rhythm, incomplete RBBB,  ms    EKG dated 10/14/2020: My personal interpretation is sinus rhythm, incomplete RBBB    Echo dated 10/19/2020:   Normal LV size and function, EF 65%  No regional wall motion abnormality      Radiology test Review:  Bilateral LE US vein study (10/27/2020):    CONCLUSIONS   Right lower extremity -   No evidence of superficial or deep venous thrombosis.   Deep venous reflux demonstrated in the common femoral vein.   Superficial venous reflux demonstrated in the proximal thigh greater    saphenous vein.      Left lower extremity -   No evidence of superficial or deep venous thrombosis.   Deep venous reflux demonstrated in the common femoral vein.   Superficial venous reflux demonstrated in the greater saphenous vein    origin.      Assessment & Plan     1. Primary hypertension  EKG    amLODIPine (NORVASC) 5 MG Tab      2. Incomplete RBBB        3. Pain in both lower extremities  US-EXTREMITY ARTERY LOWER BILAT W/ZAY (COMBO)      4. Dyslipidemia  CT-CARDIAC SCORING          Blood pressure well controlled.  Continue amlodipine 5 mg and lisinopril 40 mg daily.    Obtain bilateral lower extremity ZAY to rule out peripheral arterial disease given  ongoing symptoms of lower extremity pain with exertion.    Patient's current 10-year ASCVD risk score is 14.9%, obtain coronary calcium score for cardiac risk assessment. If calcium score is elevated, will initiate statin medication which has been discussed with the patient who voices understanding and is in agreement with.    I have independently interpreted test results and discussed results and management with the patient.    All of patient's excellent questions were answered to the best of my knowledge and to his satisfaction.  It was a pleasure seeing Mr. Martin Chung III in my clinic today. Return in about 1 year (around 1/30/2024). Patient is aware to call the cardiology clinic with any questions or concerns.      Alex Rodas MD  [facility]  [facility], [facility]  [address] [address]  Phone: [phone]  Fax: [phone]    Please note that this dictation was created using voice recognition software. I have made every reasonable attempt to correct obvious errors, but it is possible there are errors of grammar and possibly content that I did not discover before finalizing the note.

## 2023-01-31 LAB — EKG IMPRESSION: NORMAL

## 2023-02-02 ENCOUNTER — HOSPITAL ENCOUNTER (OUTPATIENT)
Dept: RADIOLOGY | Facility: MEDICAL CENTER | Age: 68
End: 2023-02-02
Attending: INTERNAL MEDICINE
Payer: COMMERCIAL

## 2023-02-02 DIAGNOSIS — E78.5 DYSLIPIDEMIA: ICD-10-CM

## 2023-02-02 PROCEDURE — 4410556 CT-CARDIAC SCORING (SELF PAY ONLY)

## 2023-02-03 ENCOUNTER — PATIENT MESSAGE (OUTPATIENT)
Dept: CARDIOLOGY | Facility: MEDICAL CENTER | Age: 68
End: 2023-02-03
Payer: MEDICARE

## 2023-02-06 ENCOUNTER — HOSPITAL ENCOUNTER (OUTPATIENT)
Dept: RADIOLOGY | Facility: MEDICAL CENTER | Age: 68
End: 2023-02-06
Attending: INTERNAL MEDICINE
Payer: MEDICARE

## 2023-02-06 DIAGNOSIS — M79.605 PAIN IN BOTH LOWER EXTREMITIES: ICD-10-CM

## 2023-02-06 DIAGNOSIS — M79.604 PAIN IN BOTH LOWER EXTREMITIES: ICD-10-CM

## 2023-02-06 PROCEDURE — 93925 LOWER EXTREMITY STUDY: CPT

## 2023-02-06 PROCEDURE — 93922 UPR/L XTREMITY ART 2 LEVELS: CPT

## 2023-02-06 PROCEDURE — 93925 LOWER EXTREMITY STUDY: CPT | Mod: 26 | Performed by: INTERNAL MEDICINE

## 2023-02-07 ENCOUNTER — PATIENT MESSAGE (OUTPATIENT)
Dept: CARDIOLOGY | Facility: MEDICAL CENTER | Age: 68
End: 2023-02-07
Payer: MEDICARE

## 2023-02-07 NOTE — TELEPHONE ENCOUNTER
Called and spoke with the patient regarding results.  Can you please send a prescription for Crestor 5 mg every evening to his pharmacy?  Thanks.

## 2023-02-07 NOTE — PATIENT COMMUNICATION
To DA: pt awaiting response from you regarding both tests on 2/2 and 2/6 and your advice. Thank you!

## 2023-02-08 ENCOUNTER — PATIENT MESSAGE (OUTPATIENT)
Dept: CARDIOLOGY | Facility: MEDICAL CENTER | Age: 68
End: 2023-02-08
Payer: MEDICARE

## 2023-02-08 DIAGNOSIS — E78.5 DYSLIPIDEMIA: ICD-10-CM

## 2023-02-10 RX ORDER — ROSUVASTATIN CALCIUM 5 MG/1
5 TABLET, COATED ORAL EVERY EVENING
Qty: 90 TABLET | Refills: 3 | Status: SHIPPED | OUTPATIENT
Start: 2023-02-10 | End: 2023-12-05 | Stop reason: SDUPTHER

## 2023-02-10 NOTE — PATIENT COMMUNICATION
------------------------------------  New Rx sent to pharmacy    rosuvastatin (CRESTOR) 5 MG Tab 90 Tablet 3/3 2/10/2023     Sig - Route: Take 1 Tablet by mouth every evening. - Oral    Sent to pharmacy as: Rosuvastatin Calcium 5 MG Oral Tablet (CRESTOR)    Cosign for Ordering: Required by Alex Rodas M.D.    E-Prescribing Status: Receipt confirmed by pharmacy (2/10/2023  1:07 PM PST)      Pharmacy    [id number] - [place], [place] - [phone]

## 2023-02-14 NOTE — PROGRESS NOTES
"Subjective:     CC: medication and lab review    HPI:   Martin presents today with     Hep A and B not immune- patient is due for his next twinrix on 5/21/23    Hyperglycemia- A1c was 5.8 on 10/17/22.  He is eating a low glycemic diet overall.    HLD- LDL was less than 100 on 10/17/22.  He started rosuvastatin 3 days ago due to elevated CT cardiac score.  No negative Ses to this medication.  He is cutting back on alcohol with goal of no more than 3 drinks per week.      Chest pain- onset a few months ago.  He has had a couple episodes of clamminess, SOB and \"heart burn sensation with chest discomfort\" while walking despite 30degree weather.  No tingling.   Last episode was 12/2022.  He would call his wife who would pick him up.  He does not have a follow-up with Dr. Rodas but plan for follow-up in about 1 year.  He is taking aspirin 81mg daily.  Other than these episodes, do GERD symptoms.  He states that his GERD was triggered by hiatal hernia in the past but improved with weight and HTN and FLO treatment.    Diarrhea- improved with Imodium he has been taking 1-3 daily.    Past Medical History:   Diagnosis Date    Chickenpox     [place] measles     Mumps     Sleep apnea        Social History     Tobacco Use    Smoking status: Never    Smokeless tobacco: Never   Vaping Use    Vaping Use: Never used   Substance Use Topics    Alcohol use: Yes     Comment: Under 10 drinks/week    Drug use: Not Currently       Current Outpatient Medications Ordered in Epic   Medication Sig Dispense Refill    omeprazole (PRILOSEC) 20 MG delayed-release capsule Take 1 Capsule by mouth every day. 30 Capsule 1    rosuvastatin (CRESTOR) 5 MG Tab Take 1 Tablet by mouth every evening. 90 Tablet 3    amLODIPine (NORVASC) 5 MG Tab Take 1 Tablet by mouth every day. 100 Tablet 3    albuterol (VENTOLIN HFA) 108 (90 Base) MCG/ACT Aero Soln inhalation aerosol Inhale 2 Puffs every four hours as needed for Shortness of Breath. 8.5 g 1    fluoxetine " "(PROZAC) 40 MG capsule Take 1 Capsule by mouth every day. 90 Capsule 2    lisinopril (PRINIVIL) 40 MG tablet Take 1 Tablet by mouth every day. 100 Tablet 2    Loperamide HCl (IMODIUM PO) Take  by mouth.      cetirizine (ZYRTEC) 10 MG Tab Take 10 mg by mouth every day.      aspirin 81 MG tablet Take 81 mg by mouth every day.      Multiple Vitamins-Minerals (MULTIVITAMIN ADULT PO) Take 1 Tab by mouth every day.      Vitamin D, Ergocalciferol, 50 MCG (2000 UT) Cap Take 3 Tabs by mouth every day.      Saw Palmetto, Serenoa repens, (SAW PALMETTO PO) Take 320 mg by mouth every day.       No current [facility]-ordered facility-administered medications on file.       Allergies:  Patient has no known allergies.    Health Maintenance: pt to get hep A/B vaccine when due    ROS:  Gen: no fevers/chills, no changes in weight  Pulm: no sob, no cough  CV: no palpitations  GI: no nausea/vomiting, no diarrhea  : no dysuria        Objective:       Exam:  /68 (BP Location: Left arm, Patient Position: Sitting, BP Cuff Size: Adult long)   Pulse 63   Temp 36.9 °C (98.4 °F) (Temporal)   Resp 20   Ht 1.803 m (5' 11\")   Wt 101 kg (222 lb 12.4 oz)   SpO2 96%   BMI 31.07 kg/m²  Body mass index is 31.07 kg/m².    Gen: Alert and oriented, No apparent distress.  Neck: Neck is supple without lymphadenopathy.  Lungs: Normal effort, CTA bilaterally, no wheezes, rhonchi, or rales  CV: Regular rate and rhythm. No murmurs, rubs, or gallops.  Abd Soft, nontender, nondistended. Normoactive bowel sounds  Ext: No clubbing, cyanosis, edema.    Labs: 10/17/22 labs reviewed with patient    Assessment & Plan:     67 y.o. male with the following -     1. Prediabetes  Chronic, asymptomatic.  Dietary and exercise guidance given  - HEMOGLOBIN A1C; Future    2. Hyperlipidemia, unspecified hyperlipidemia type  Chronic issue for which she had recent CT cardiac score which was elevated.  He was started on rosuvastatin which he is tolerating well.  Encouraged " low-cholesterol diet, alcohol avoidance and follow-up labs ordered.  - Lipid Profile; Future  - HEPATIC FUNCTION PANEL; Future    3. Chest pain, unspecified type  Newly reported issue.  He had recent EKG 1/31/23 showed sinus bradycardia and incomplete right bundle branch block and LAFB.  He is established with cardiology.  Given his CT cardiac score showing elevation of the LAD and his symptoms, recommended a treadmill stress test.  Follow-up and ER precautions given.  I will also notify his cardiologist of his symptoms.  - NM-CARDIAC TREADMILL ONLY-NO IMAGING; Future    4. Primary hypertension  Chronic, well controlled.  Continue current medications    5. Gastroesophageal reflux disease without esophagitis  Chronic issue with likely worsening since last visit.  Given his chest pain while walking feeling similar but not completely consistent with his baseline GERD, recommended omeprazole 20 mg daily.  Recommended GERD prevention diet and alcohol cessation.  Follow-up precautions given    HCC Gap for updated today    Return in about 6 weeks (around 3/29/2023) for GERD follow-up and lab review.    Please note that this dictation was created using voice recognition software. I have made every reasonable attempt to correct obvious errors, but I expect that there are errors of grammar and possibly content that I did not discover before finalizing the note.

## 2023-02-15 ENCOUNTER — PATIENT MESSAGE (OUTPATIENT)
Dept: MEDICAL GROUP | Facility: MEDICAL CENTER | Age: 68
End: 2023-02-15

## 2023-02-15 ENCOUNTER — OFFICE VISIT (OUTPATIENT)
Dept: MEDICAL GROUP | Facility: MEDICAL CENTER | Age: 68
End: 2023-02-15
Payer: MEDICARE

## 2023-02-15 VITALS
OXYGEN SATURATION: 96 % | SYSTOLIC BLOOD PRESSURE: 124 MMHG | HEIGHT: 71 IN | HEART RATE: 63 BPM | BODY MASS INDEX: 31.19 KG/M2 | DIASTOLIC BLOOD PRESSURE: 68 MMHG | TEMPERATURE: 98.4 F | WEIGHT: 222.78 LBS | RESPIRATION RATE: 20 BRPM

## 2023-02-15 DIAGNOSIS — K21.9 GASTROESOPHAGEAL REFLUX DISEASE WITHOUT ESOPHAGITIS: ICD-10-CM

## 2023-02-15 DIAGNOSIS — E78.5 HYPERLIPIDEMIA, UNSPECIFIED HYPERLIPIDEMIA TYPE: ICD-10-CM

## 2023-02-15 DIAGNOSIS — R73.03 PREDIABETES: ICD-10-CM

## 2023-02-15 DIAGNOSIS — R07.9 CHEST PAIN, UNSPECIFIED TYPE: ICD-10-CM

## 2023-02-15 DIAGNOSIS — I10 PRIMARY HYPERTENSION: ICD-10-CM

## 2023-02-15 PROCEDURE — 99214 OFFICE O/P EST MOD 30 MIN: CPT | Performed by: FAMILY MEDICINE

## 2023-02-15 RX ORDER — OMEPRAZOLE 20 MG/1
20 CAPSULE, DELAYED RELEASE ORAL DAILY
Qty: 30 CAPSULE | Refills: 1 | Status: SHIPPED | OUTPATIENT
Start: 2023-02-15 | End: 2023-03-10

## 2023-02-15 ASSESSMENT — PATIENT HEALTH QUESTIONNAIRE - PHQ9: CLINICAL INTERPRETATION OF PHQ2 SCORE: 0

## 2023-02-15 ASSESSMENT — FIBROSIS 4 INDEX: FIB4 SCORE: 1.04

## 2023-02-16 ENCOUNTER — HOSPITAL ENCOUNTER (OUTPATIENT)
Dept: RADIOLOGY | Facility: MEDICAL CENTER | Age: 68
End: 2023-02-16
Attending: FAMILY MEDICINE
Payer: MEDICARE

## 2023-02-16 DIAGNOSIS — R07.9 CHEST PAIN, UNSPECIFIED TYPE: ICD-10-CM

## 2023-02-16 PROCEDURE — 93018 CV STRESS TEST I&R ONLY: CPT | Performed by: INTERNAL MEDICINE

## 2023-02-16 PROCEDURE — 93017 CV STRESS TEST TRACING ONLY: CPT

## 2023-03-08 ENCOUNTER — OFFICE VISIT (OUTPATIENT)
Dept: CARDIOLOGY | Facility: MEDICAL CENTER | Age: 68
End: 2023-03-08
Payer: MEDICARE

## 2023-03-08 VITALS
HEIGHT: 71 IN | BODY MASS INDEX: 30.24 KG/M2 | HEART RATE: 60 BPM | DIASTOLIC BLOOD PRESSURE: 68 MMHG | SYSTOLIC BLOOD PRESSURE: 116 MMHG | WEIGHT: 216 LBS | RESPIRATION RATE: 16 BRPM | OXYGEN SATURATION: 98 %

## 2023-03-08 DIAGNOSIS — E78.5 DYSLIPIDEMIA: ICD-10-CM

## 2023-03-08 DIAGNOSIS — R93.1 ELEVATED CORONARY ARTERY CALCIUM SCORE: ICD-10-CM

## 2023-03-08 DIAGNOSIS — I10 ESSENTIAL HYPERTENSION: ICD-10-CM

## 2023-03-08 PROCEDURE — 99214 OFFICE O/P EST MOD 30 MIN: CPT | Performed by: INTERNAL MEDICINE

## 2023-03-08 ASSESSMENT — FIBROSIS 4 INDEX: FIB4 SCORE: 1.04

## 2023-03-08 NOTE — PROGRESS NOTES
Cardiology Follow-up Consultation Note    Date of note:    3/8/2023  Primary Care Provider: Carolyne Sousa D.O.    Name:             Martin Chung III   YOB: 1955  MRN:               [id number]    Chief Complaint   Patient presents with    Hypertension    Chest Pain       HISTORY OF PRESENT ILLNESS  Mr. Martin Chung III is a 67 y.o. male who returns to see us for follow-up of iRBBB and HTN.  Is here with his wife, Funmilayo.    Pertinent history:  Incomplete RBBB, incidental finding on ECG  FLO on CPAP  Essential hypertension    Last clinic visit: 1/30/2023    Interim History:  Since his last visit, we started him on Crestor 5 mg daily for elevated calcium score.  Has been tolerating the medication without any side effects.  Underwent treadmill stress test which he found difficult to achieve target heart rate due to significant shortness of breath but no chest pain or pressure.       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 30 Capsule 1    rosuvastatin (CRESTOR) 5 MG Tab Take 1 Tablet by mouth every evening. 90 Tablet 3    amLODIPine (NORVASC) 5 MG Tab Take 1 Tablet by mouth every day. 100 Tablet 3    albuterol (VENTOLIN HFA) 108 (90 Base) MCG/ACT Aero Soln inhalation aerosol Inhale 2 Puffs every four hours as needed for Shortness of Breath. 8.5 g 1    fluoxetine (PROZAC) 40 MG capsule Take 1 Capsule by mouth every day. 90 Capsule 2    lisinopril (PRINIVIL) 40 MG tablet Take 1 Tablet by mouth every day. 100 Tablet 2    Loperamide HCl (IMODIUM PO) Take  by mouth.      cetirizine (ZYRTEC) 10 MG Tab Take 10 mg by mouth every day.      aspirin  81 MG tablet Take 81 mg by mouth every day.      Multiple Vitamins-Minerals (MULTIVITAMIN ADULT PO) Take 1 Tab by mouth every day.      Vitamin D, Ergocalciferol, 50 MCG (2000 UT) Cap Take 3 Tabs by mouth every day.      Saw Palmetto, Serenoa repens, (SAW PALMETTO PO) Take 320 mg by mouth every day.       No current facility-administered medications for this visit.         No Known Allergies      Family History   Problem Relation Age of Onset    Cancer Mother         breast cancer    Hypertension Mother     Stroke Father     Cancer Other         prostate cancer    Psychiatric Illness Other         depression    Migraines Other     Diabetes Neg Hx     Hyperlipidemia Neg Hx          Social History     Socioeconomic History    Marital status:      Spouse name: Not on file    Number of children: Not on file    Years of education: Not on file    Highest education level: Bachelor's degree (e.g., BA, AB, BS)   Occupational History    Not on file   Tobacco Use    Smoking status: Never    Smokeless tobacco: Never   Vaping Use    Vaping Use: Never used   Substance and Sexual Activity    Alcohol use: Not Currently     Comment: Under 10 drinks/week    Drug use: Not Currently    Sexual activity: Yes     Partners: Female   Other Topics Concern    Not on file   Social History Narrative    Not on file     Social Determinants of Health     Financial Resource Strain: Low Risk     Difficulty of Paying Living Expenses: Not hard at all   Food Insecurity: No Food Insecurity    Worried About Running Out of Food in the Last Year: Never true    Ran Out of Food in the Last Year: Never true   Transportation Needs: No Transportation Needs    Lack of Transportation (Medical): No    Lack of Transportation (Non-Medical): No   Physical Activity: Sufficiently Active    Days of Exercise per Week: 7 days    Minutes of Exercise per Session: 60 min   Stress: No Stress Concern Present    Feeling of Stress : Only a little   Social Connections:  "Socially Integrated    Frequency of Communication with Friends and Family: More than three times a week    Frequency of Social Gatherings with Friends and Family: More than three times a week    Attends Church Services: More than 4 times per year    Active Member of Clubs or Organizations: Yes    Attends Club or Organization Meetings: More than 4 times per year    Marital Status:    Intimate Partner Violence: Not on file   Housing Stability: Low Risk     Unable to Pay for Housing in the Last Year: No    Number of Places Lived in the Last Year: 1    Unstable Housing in the Last Year: No         Physical Exam:  Ambulatory Vitals  /68   Pulse 60   Resp 16   Ht 1.803 m (5' 11\")   Wt 98 kg (216 lb)   SpO2 98%    Oxygen Therapy:  Pulse Oximetry: 98 %  BP Readings from Last 4 Encounters:   03/08/23 116/68   02/15/23 124/68   01/30/23 124/70   11/22/22 130/74       Weight/BMI: Body mass index is 30.13 kg/m².  Wt Readings from Last 4 Encounters:   03/08/23 98 kg (216 lb)   02/15/23 101 kg (222 lb 12.4 oz)   01/30/23 101 kg (223 lb)   11/22/22 101 kg (222 lb)       GEN: Well developed, well nourished and in no acute distress.  HEART: no significant JVD, regular rate and rhythm, normal S1 and S2, no murmurs, no third heart sounds, normal cardiac palpation  LUNG: clear to auscultation bilaterally, no wheezing, no crackles, normal respiratory effort on room air  ABDOMEN: soft, non-tender, non-distended, normal bowel sounds throughout  EXTREMITIES: no peripheral edema noted  VASCULAR: no significantly elevated jugular venous pressure, no carotid bruits noted, radial pulses 2+ and equal      Lab Data Review:  Lab Results   Component Value Date/Time    CHOLSTRLTOT 164 10/17/2022 09:34 AM    LDL 95 10/17/2022 09:34 AM    HDL 40 10/17/2022 09:34 AM    TRIGLYCERIDE 144 10/17/2022 09:34 AM       Lab Results   Component Value Date/Time    SODIUM 139 10/17/2022 09:34 AM    POTASSIUM 4.7 10/17/2022 09:34 AM    " CHLORIDE 104 10/17/2022 09:34 AM    CO2 26 10/17/2022 09:34 AM    GLUCOSE 104 (H) 10/17/2022 09:34 AM    BUN 12 10/17/2022 09:34 AM    CREATININE 0.83 10/17/2022 09:34 AM     Lab Results   Component Value Date/Time    ALKPHOSPHAT 86 10/17/2022 09:34 AM    ASTSGOT 24 10/17/2022 09:34 AM    ALTSGPT 33 10/17/2022 09:34 AM    TBILIRUBIN 0.5 10/17/2022 09:34 AM      Lab Results   Component Value Date/Time    WBC 5.3 10/17/2022 09:34 AM     Lab Results   Component Value Date/Time    HBA1C 5.8 (H) 10/17/2022 09:34 AM    HBA1C 5.4 10/11/2021 10:51 AM       Cardiac Imaging and Procedures Review:    EKG dated 1/30/2023: My personal interpretation is sinus rhythm, incomplete RBBB,  ms    EKG dated 10/14/2020: My personal interpretation is sinus rhythm, incomplete RBBB    ETT 2/16/2023:  Negative stress ECG for ischemia  Fair exercise tolerance at 7.4 METS    Echo dated 10/19/2020:   Normal LV size and function, EF 65%  No regional wall motion abnormality      Radiology test Review:  CCS 2/2/2023:  Coronary calcification:  LMA - 0.0  LCX - 0.0  LAD - 58.4  RCA - 0.0  PDA - 0.0     Total Calcium Score: 58.4  Percentile: Calcium score is above the 25th percentile for the patient's age and sex.    Bilateral US ZAY 2/6/2023:   CONCLUSIONS   No prior examination.   Bilaterally, diffuse plaque and multiphasic flow throughout the common    femoral, superficial femoral, and popliteal arteries with no    hemodynamically significant stenosis.     Bilateral LE US vein study (10/27/2020):    CONCLUSIONS   Right lower extremity -   No evidence of superficial or deep venous thrombosis.   Deep venous reflux demonstrated in the common femoral vein.   Superficial venous reflux demonstrated in the proximal thigh greater    saphenous vein.      Left lower extremity -   No evidence of superficial or deep venous thrombosis.   Deep venous reflux demonstrated in the common femoral vein.   Superficial venous reflux demonstrated in the greater  saphenous vein    origin.      Assessment & Plan     1. Essential hypertension        2. Dyslipidemia        3. Elevated coronary artery calcium score            Doing well from a cardiovascular perspective.  We reviewed exercise treadmill stress test which shows no ischemic changes which is reassuring.  Had difficulty achieving target heart rate likely from cardiac deconditioning.  Discussed ACC/AHA guidelines of cardio focused exercise with reaching at least 85% of your maximum predicted heart rate for a minimum of 150 minutes/week to maintain healthy weight and reduce cardiovascular risk factors for atherosclerotic heart disease.    Blood pressure well controlled.  Continue amlodipine 5 mg and lisinopril 40 mg daily.     For elevated coronary calcium score and dyslipidemia, discussed LDL goal less than 70.  Continue Crestor 5 mg daily for now.  Has follow-up lipid panel.  Recommend increasing Crestor to 10 mg if repeat LDL is above goal of 70.      I have independently interpreted test results and discussed results and management with the patient.    All of patient and his wife's excellent questions were answered to the best of my knowledge and to their satisfaction.  It was a pleasure seeing Mr. Martin Chung III in my clinic today. Return in about 6 months (around 9/8/2023). Patient is aware to call the cardiology clinic with any questions or concerns.      Alex Rodas MD  [facility]  [place] [facility], [facility].  [address] [address]  Phone: [phone]  Fax: [phone]    Please note that this dictation was created using voice recognition software. I have made every reasonable attempt to correct obvious errors, but it is possible there are errors of grammar and possibly content that I did not discover before finalizing the note.

## 2023-03-10 RX ORDER — OMEPRAZOLE 20 MG/1
20 CAPSULE, DELAYED RELEASE ORAL DAILY
Qty: 30 CAPSULE | Refills: 1 | Status: SHIPPED | OUTPATIENT
Start: 2023-03-10 | End: 2023-04-03

## 2023-03-10 NOTE — TELEPHONE ENCOUNTER
Received request via: Pharmacy    Was the patient seen in the last year in this department? Yes    Does the patient have an active prescription (recently filled or refills available) for medication(s) requested? No    Does the patient have long term Plus and need 100 day supply (blood pressure, diabetes and cholesterol meds only)? Patient does not have SCP    Future Appointments         Provider Department [place]    4/3/2023 2:00 PM (Arrive by 1:45 PM) Carolyne Sousa D.O. [facility]    4/13/2023 9:30 AM Funmilayo Hatfield M.D. [facility]    5/30/2023 8:20 AM (Arrive by 8:05 AM) Vicente Quintero M.D. [facility]    6/19/2023 2:15 PM (Arrive by 2:00 PM) BASSEM LORD [facility] [address]    9/25/2023 7:45 AM Alex Rodas M.D. [facility] [place] [facility]

## 2023-03-31 ENCOUNTER — HOSPITAL ENCOUNTER (OUTPATIENT)
Dept: LAB | Facility: MEDICAL CENTER | Age: 68
End: 2023-03-31
Attending: FAMILY MEDICINE
Payer: MEDICARE

## 2023-03-31 DIAGNOSIS — E78.5 HYPERLIPIDEMIA, UNSPECIFIED HYPERLIPIDEMIA TYPE: ICD-10-CM

## 2023-03-31 DIAGNOSIS — R73.03 PREDIABETES: ICD-10-CM

## 2023-03-31 LAB
ALBUMIN SERPL BCP-MCNC: 4.2 G/DL (ref 3.2–4.9)
ALP SERPL-CCNC: 79 U/L (ref 30–99)
ALT SERPL-CCNC: 23 U/L (ref 2–50)
AST SERPL-CCNC: 17 U/L (ref 12–45)
BILIRUB CONJ SERPL-MCNC: <0.2 MG/DL (ref 0.1–0.5)
BILIRUB INDIRECT SERPL-MCNC: NORMAL MG/DL (ref 0–1)
BILIRUB SERPL-MCNC: 0.7 MG/DL (ref 0.1–1.5)
CHOLEST SERPL-MCNC: 99 MG/DL (ref 100–199)
EST. AVERAGE GLUCOSE BLD GHB EST-MCNC: 114 MG/DL
FASTING STATUS PATIENT QL REPORTED: NORMAL
HBA1C MFR BLD: 5.6 % (ref 4–5.6)
HDLC SERPL-MCNC: 43 MG/DL
LDLC SERPL CALC-MCNC: 46 MG/DL
PROT SERPL-MCNC: 6.6 G/DL (ref 6–8.2)
TRIGL SERPL-MCNC: 51 MG/DL (ref 0–149)

## 2023-03-31 PROCEDURE — 83036 HEMOGLOBIN GLYCOSYLATED A1C: CPT

## 2023-03-31 PROCEDURE — 36415 COLL VENOUS BLD VENIPUNCTURE: CPT

## 2023-03-31 PROCEDURE — 80061 LIPID PANEL: CPT

## 2023-03-31 PROCEDURE — 80076 HEPATIC FUNCTION PANEL: CPT

## 2023-04-03 RX ORDER — OMEPRAZOLE 20 MG/1
20 CAPSULE, DELAYED RELEASE ORAL DAILY
Qty: 30 CAPSULE | Refills: 1 | Status: SHIPPED | OUTPATIENT
Start: 2023-04-03 | End: 2023-05-01

## 2023-04-03 NOTE — TELEPHONE ENCOUNTER
Received request via: Pharmacy    Was the patient seen in the last year in this department? Yes    Does the patient have an active prescription (recently filled or refills available) for medication(s) requested? No    Does the patient have custodial Plus and need 100 day supply (blood pressure, diabetes and cholesterol meds only)? Medication is not for cholesterol, blood pressure or diabetes    Future Appointments         Provider Department [facility]    4/13/2023 9:30 AM Funmilayo Hatfield M.D. [facility]    5/3/2023 10:40 AM (Arrive by 10:25 AM) Massiel Youssef M.D. [facility]    5/30/2023 8:20 AM (Arrive by 8:05 AM) Vicente Quintero M.D. [facility]    6/19/2023 2:15 PM (Arrive by 2:00 PM) BAS[facility] LORD [facility] [address]    9/25/2023 7:45 AM Alex Rodas M.D. [facility] [place] [facility]

## 2023-04-04 ENCOUNTER — TELEPHONE (OUTPATIENT)
Dept: MEDICAL GROUP | Facility: MEDICAL CENTER | Age: 68
End: 2023-04-04
Payer: MEDICARE

## 2023-04-04 NOTE — TELEPHONE ENCOUNTER
----- Message from Carolyne Sousa D.O. sent at 4/4/2023  8:57 AM PDT -----  Regarding: mychart message not read  Pt has not read lab result mychart message from 3/31/23.  Please call and inform them of the message and if they do not answer, then send a letter. -Dr. Sousa    ----- Message -----  From: Carolyne Sousa D.O.  Sent: 4/3/2023   6:39 AM PDT  To: Carolyne Sousa D.O.    Mycsung message sent

## 2023-04-09 DIAGNOSIS — J45.909 MILD ASTHMA WITHOUT COMPLICATION, UNSPECIFIED WHETHER PERSISTENT: ICD-10-CM

## 2023-04-10 ENCOUNTER — PATIENT MESSAGE (OUTPATIENT)
Dept: MEDICAL GROUP | Facility: MEDICAL CENTER | Age: 68
End: 2023-04-10
Payer: MEDICARE

## 2023-04-10 RX ORDER — ALBUTEROL SULFATE 90 UG/1
2 AEROSOL, METERED RESPIRATORY (INHALATION) EVERY 4 HOURS PRN
Qty: 8.5 EACH | Refills: 1 | Status: SHIPPED | OUTPATIENT
Start: 2023-04-10 | End: 2025-04-09

## 2023-04-13 ENCOUNTER — OFFICE VISIT (OUTPATIENT)
Dept: DERMATOLOGY | Facility: IMAGING CENTER | Age: 68
End: 2023-04-13
Payer: MEDICARE

## 2023-04-13 DIAGNOSIS — L82.1 SEBORRHEIC KERATOSIS: ICD-10-CM

## 2023-04-13 DIAGNOSIS — L81.4 LENTIGO: ICD-10-CM

## 2023-04-13 DIAGNOSIS — Z12.83 SKIN CANCER SCREENING: ICD-10-CM

## 2023-04-13 DIAGNOSIS — D23.9 DERMATOFIBROMA: ICD-10-CM

## 2023-04-13 DIAGNOSIS — L57.0 AK (ACTINIC KERATOSIS): ICD-10-CM

## 2023-04-13 DIAGNOSIS — D22.9 NEVUS: ICD-10-CM

## 2023-04-13 DIAGNOSIS — Z85.89 HISTORY OF SQUAMOUS CELL CARCINOMA: ICD-10-CM

## 2023-04-13 DIAGNOSIS — L57.0 ACTINIC KERATOSIS: ICD-10-CM

## 2023-04-13 DIAGNOSIS — L90.8 SKIN AGING: ICD-10-CM

## 2023-04-13 PROCEDURE — 17003 DESTRUCT PREMALG LES 2-14: CPT | Performed by: DERMATOLOGY

## 2023-04-13 PROCEDURE — 17000 DESTRUCT PREMALG LESION: CPT | Performed by: DERMATOLOGY

## 2023-04-13 PROCEDURE — 99213 OFFICE O/P EST LOW 20 MIN: CPT | Mod: 25 | Performed by: DERMATOLOGY

## 2023-04-13 RX ORDER — AMLODIPINE BESYLATE 2.5 MG/1
TABLET ORAL
COMMUNITY
Start: 2023-04-12 | End: 2023-09-12
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
## 2023-04-27 NOTE — TELEPHONE ENCOUNTER
Received request via: Pharmacy    Was the patient seen in the last year in this department? Yes    Does the patient have an active prescription (recently filled or refills available) for medication(s) requested? No    Does the patient have FPC Plus and need 100 day supply (blood pressure, diabetes and cholesterol meds only)? Patient does not have SCP    Future Appointments         Provider Department [place]    5/3/2023 10:40 AM (Arrive by 10:25 AM) Massiel Youssef M.D. [facility]    5/30/2023 8:20 AM (Arrive by 8:05 AM) Vicente Quintero M.D. [facility]    6/19/2023 2:15 PM (Arrive by 2:00 PM) [facility] [address]    9/25/2023 7:45 AM Alex Rodas M.D. [facility] [place] [facility]     10/13/2023 8:30 AM Funmilayo Hatfield M.D. [facility]at[facility], [facility]

## 2023-05-01 RX ORDER — OMEPRAZOLE 20 MG/1
20 CAPSULE, DELAYED RELEASE ORAL DAILY
Qty: 30 CAPSULE | Refills: 1 | Status: SHIPPED | OUTPATIENT
Start: 2023-05-01

## 2023-05-03 ENCOUNTER — OFFICE VISIT (OUTPATIENT)
Dept: MEDICAL GROUP | Facility: PHYSICIAN GROUP | Age: 68
End: 2023-05-03
Payer: MEDICARE

## 2023-05-03 VITALS
BODY MASS INDEX: 29.29 KG/M2 | OXYGEN SATURATION: 97 % | WEIGHT: 209.2 LBS | TEMPERATURE: 97.3 F | RESPIRATION RATE: 18 BRPM | HEIGHT: 71 IN | DIASTOLIC BLOOD PRESSURE: 66 MMHG | SYSTOLIC BLOOD PRESSURE: 122 MMHG | HEART RATE: 61 BPM

## 2023-05-03 DIAGNOSIS — Z85.828 HISTORY OF SQUAMOUS CELL CARCINOMA OF SKIN: ICD-10-CM

## 2023-05-03 DIAGNOSIS — J45.20 MILD INTERMITTENT REACTIVE AIRWAY DISEASE WITHOUT COMPLICATION: ICD-10-CM

## 2023-05-03 DIAGNOSIS — Z12.5 SCREENING FOR PROSTATE CANCER: ICD-10-CM

## 2023-05-03 DIAGNOSIS — E04.2 NON-TOXIC MULTINODULAR GOITER: ICD-10-CM

## 2023-05-03 DIAGNOSIS — K21.9 GASTROESOPHAGEAL REFLUX DISEASE WITHOUT ESOPHAGITIS: ICD-10-CM

## 2023-05-03 DIAGNOSIS — E78.5 HYPERLIPIDEMIA, UNSPECIFIED HYPERLIPIDEMIA TYPE: ICD-10-CM

## 2023-05-03 DIAGNOSIS — I10 PRIMARY HYPERTENSION: ICD-10-CM

## 2023-05-03 DIAGNOSIS — E55.9 VITAMIN D DEFICIENCY: ICD-10-CM

## 2023-05-03 DIAGNOSIS — G47.33 OBSTRUCTIVE SLEEP APNEA: ICD-10-CM

## 2023-05-03 DIAGNOSIS — R73.03 PREDIABETES: ICD-10-CM

## 2023-05-03 PROBLEM — H61.21 IMPACTED CERUMEN OF RIGHT EAR: Status: RESOLVED | Noted: 2022-11-22 | Resolved: 2023-05-03

## 2023-05-03 PROBLEM — J45.909 REACTIVE AIRWAY DISEASE WITHOUT COMPLICATION: Status: ACTIVE | Noted: 2023-05-03

## 2023-05-03 PROCEDURE — 99214 OFFICE O/P EST MOD 30 MIN: CPT | Performed by: FAMILY MEDICINE

## 2023-05-03 ASSESSMENT — FIBROSIS 4 INDEX: FIB4 SCORE: 0.88

## 2023-05-03 NOTE — PROGRESS NOTES
Subjective:     CC:   Chief Complaint   Patient presents with    Establish Care       HPI:   Martin presents today to establish care.  Patient has been working on losing weight and has lost weight.  Patient does have a history of sleep apnea for the last 6 years and has been using his CPAP machine.  Patient does have a diagnosis of attention deficit patient is on Prozac for this and feels like it is helped him a lot and he is doing well.    Past Medical History:   Diagnosis Date    Chickenpox     [place] measles     Mumps     Sleep apnea        Social History     Tobacco Use    Smoking status: Never    Smokeless tobacco: Never   Vaping Use    Vaping Use: Never used   Substance Use Topics    Alcohol use: Not Currently     Comment: Under 10 drinks/week    Drug use: Not Currently       Current Outpatient Medications Ordered in Epic   Medication Sig Dispense Refill    omeprazole (PRILOSEC) 20 MG delayed-release capsule TAKE 1 CAPSULE BY MOUTH EVERY DAY 30 Capsule 1    amLODIPine (NORVASC) 2.5 MG Tab       albuterol 108 (90 Base) MCG/ACT Aero Soln inhalation aerosol INHALE 2 PUFFS EVERY FOUR HOURS AS NEEDED FOR SHORTNESS OF BREATH. 8.5 Each 1    rosuvastatin (CRESTOR) 5 MG Tab Take 1 Tablet by mouth every evening. 90 Tablet 3    fluoxetine (PROZAC) 40 MG capsule Take 1 Capsule by mouth every day. 90 Capsule 2    lisinopril (PRINIVIL) 40 MG tablet Take 1 Tablet by mouth every day. 100 Tablet 2    Loperamide HCl (IMODIUM PO) Take  by mouth.      cetirizine (ZYRTEC) 10 MG Tab Take 10 mg by mouth every day.      aspirin 81 MG tablet Take 81 mg by mouth every day.      Multiple Vitamins-Minerals (MULTIVITAMIN ADULT PO) Take 1 Tab by mouth every day.      Vitamin D, Ergocalciferol, 50 MCG (2000 UT) Cap Take 3 Tabs by mouth every day.      Saw Palmetto, Serenoa repens, (SAW PALMETTO PO) Take 320 mg by mouth every day.      amLODIPine (NORVASC) 5 MG Tab Take 1 Tablet by mouth every day. 100 Tablet 3     No current Epic-ordered  "facility-administered medications on file.       Allergies:  Patient has no known allergies.    Health Maintenance: Completed    ROS:  Gen: no fevers/chills, no changes in weight  Eyes: no changes in vision  ENT: no sore throat, no hearing loss, no bloody nose  Pulm: no sob, no cough  CV: no chest pain, no palpitations  GI: no nausea/vomiting, no diarrhea  : no dysuria  Neuro: no headaches, no numbness/tingling  Heme/Lymph: no easy bruising    Objective:     Exam:  /66 (BP Location: Right arm, Patient Position: Sitting, BP Cuff Size: Adult)   Pulse 61   Temp 36.3 °C (97.3 °F) (Temporal)   Resp 18   Ht 1.803 m (5' 11\")   Wt 94.9 kg (209 lb 3.2 oz)   SpO2 97%   BMI 29.18 kg/m²  Body mass index is 29.18 kg/m².    Gen: Alert and oriented, No apparent distress.  Skin: Warm and dry.  No obvious lesions.  Eyes: Sclera wnl Pupils normal in size  Lungs: Normal effort, CTA bilaterally, no wheezes, rhonchi, or rales  CV: Regular rate and rhythm. No murmurs, rubs, or gallops.  Musculoskeletal: Normal gait. No extremity cyanosis, clubbing, or edema.  Neuro: Oriented to person, place and time  Psych: Mood is wnl       Assessment & Plan:     67 y.o. male with the following -     1. Primary hypertension  Patient's blood pressure is at goal we will continue present medication we will repeat lab work in 6 months  - CBC WITH DIFFERENTIAL; Future  - Comp Metabolic Panel; Future    2. Non-toxic multinodular goiter  I recommend repeating the thyroid check in 6 months may need to consider ultrasound due to the fact I do not see a record of a thyroid ultrasound being done.  - TSH; Future    3. Obstructive sleep apnea  Referral was written to sleep clinic  - Referral to Pulmonary and Sleep Medicine    4. History of squamous cell carcinoma of skin  Patient regularly sees dermatology for his skin checks    5. Vitamin D deficiency  I will repeat his vitamin D level in 6 months  - VITAMIN D,25 HYDROXY (DEFICIENCY); Future    6. " Prediabetes  Patient's last hemoglobin A1c looks good we will continue to monitor    7. Gastroesophageal reflux disease without esophagitis  Patient is wondering if he can decrease the omeprazole would go with every other day for 2 weeks if he is not having problems he should stop it completely.  Patient thought that the heartburn was related to prior using his CPAP machine but now he does not seem to have any problems.    8. Mild intermittent reactive airway disease without complication  Patient rarely has to use his albuterol    9. Hyperlipidemia, unspecified hyperlipidemia type  I will check his lipid panel in 6 months  - Lipid Profile; Future    10. Screening for prostate cancer  - PROSTATE SPECIFIC AG SCREENING; Future       Return in about 6 months (around 11/3/2023), or if symptoms worsen or fail to improve.    Please note that this dictation was created using voice recognition software. I have made every reasonable attempt to correct obvious errors, but I expect that there are errors of grammar and possibly content that I did not discover before finalizing the note.

## 2023-06-19 ENCOUNTER — PHARMACY VISIT (OUTPATIENT)
Dept: PHARMACY | Facility: MEDICAL CENTER | Age: 68
End: 2023-06-19
Payer: COMMERCIAL

## 2023-06-19 PROCEDURE — RXMED WILLOW AMBULATORY MEDICATION CHARGE: Performed by: INTERNAL MEDICINE

## 2023-06-19 RX ORDER — HEPATITIS A AND HEPATITIS B (RECOMBINANT) VACCINE 720; 20 [IU]/ML; UG/ML
INJECTION, SUSPENSION INTRAMUSCULAR
Qty: 1 ML | Refills: 0 | Status: SHIPPED | OUTPATIENT
Start: 2023-06-19 | End: 2023-10-30

## 2023-06-29 ENCOUNTER — TELEPHONE (OUTPATIENT)
Dept: SLEEP MEDICINE | Facility: MEDICAL CENTER | Age: 68
End: 2023-06-29

## 2023-06-29 ENCOUNTER — OFFICE VISIT (OUTPATIENT)
Dept: SLEEP MEDICINE | Facility: MEDICAL CENTER | Age: 68
End: 2023-06-29
Attending: PHYSICIAN ASSISTANT
Payer: MEDICARE

## 2023-06-29 VITALS
SYSTOLIC BLOOD PRESSURE: 114 MMHG | HEART RATE: 59 BPM | BODY MASS INDEX: 27.36 KG/M2 | HEIGHT: 72 IN | RESPIRATION RATE: 16 BRPM | WEIGHT: 202 LBS | OXYGEN SATURATION: 97 % | DIASTOLIC BLOOD PRESSURE: 68 MMHG

## 2023-06-29 DIAGNOSIS — G47.33 OSA ON CPAP: ICD-10-CM

## 2023-06-29 PROBLEM — K44.9 HIATAL HERNIA: Status: ACTIVE | Noted: 2023-06-29

## 2023-06-29 PROCEDURE — 3074F SYST BP LT 130 MM HG: CPT | Performed by: PHYSICIAN ASSISTANT

## 2023-06-29 PROCEDURE — 99213 OFFICE O/P EST LOW 20 MIN: CPT | Performed by: PHYSICIAN ASSISTANT

## 2023-06-29 PROCEDURE — 3078F DIAST BP <80 MM HG: CPT | Performed by: PHYSICIAN ASSISTANT

## 2023-06-29 ASSESSMENT — ENCOUNTER SYMPTOMS
SHORTNESS OF BREATH: 1
HEARTBURN: 1
ROS GI COMMENTS: NO DENTURES, NO MISSING TEETH, NO DIFFICULTY SWALLOWING
CHILLS: 0
COUGH: 0
INSOMNIA: 0
SPUTUM PRODUCTION: 0
PALPITATIONS: 0
FEVER: 0
ORTHOPNEA: 0
SORE THROAT: 0
WHEEZING: 0
DIZZINESS: 0
TREMORS: 0
WEIGHT LOSS: 0
SINUS PAIN: 0
HEADACHES: 0

## 2023-06-29 ASSESSMENT — FIBROSIS 4 INDEX: FIB4 SCORE: 0.88

## 2023-06-29 NOTE — TELEPHONE ENCOUNTER
"The patient came in for an office visit today and he asked if I would tell you \"hi\" and to let you know what a great doctor you are!  I promised him I would let you know, paolo.  Thank you.  "

## 2023-06-29 NOTE — PROGRESS NOTES
Chief Complaint   Patient presents with    Apnea       HPI:  Martin Chung III is a 67 y.o. year old male here today for follow-up on obstructive sleep apnea.  Patient last seen via telemedicine 12/2/2021 by Dr. Amanuel Souza.  Patient is on CPAP therapy since 2016, originally diagnosed at [facility] with reported severe FLO with AHI of 37/h.  He is a never smoker.    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    Vitals:  /68 (BP Location: Left arm, Patient Position: Sitting, BP Cuff Size: Adult)   Pulse (!) 59   Resp 16   Ht 1.829 m (6')   Wt 91.6 kg (202 lb)   SpO2 97% on room air.  Patient reports his weight is down approximately 20 pounds with BMI 27.4 kg/m².    Recent Imaging: Including echocardiogram obtained 10/19/2020 demonstrating normal ventricular chamber size, wall thickness, systolic and diastolic function.  LVEF estimated at 65%.  Normal right ventricular size and function.  Mild mitral regurgitation, mild aortic insufficiency, unable to estimate pulmonary artery pressures due to inadequate tricuspid regurgitant jet, mild pulmonic insufficiency.    Currently using  Resmed CPAP @ 11 cm H20 pressure; compliance reviewed for 5/30/2023 through 6/28/2023, days used 30/30, average daily usage 8 hours 13 minutes, 100% of days greater than or equal to 4 hours, mask leak at 7.4 LPM at 95th percentile, AHI 0.8 per hour.    Device obtained [facility] through [facility]  DME provider preferred  Mask interface fullface mask F20    Polysomnogram originally obtained at [facility] in 2016.  Reported AHI of 37 consistent with severe FLO.  Patient remains therapeutic on CPAP of 11 cm H2O pressure.      Sleep schedule goes to bed 930-10 PM, wakens 630-7 AM , and gets up during the night  occasionally   Symptoms denies daytime somnolence, morning headache         Review of Systems   Constitutional:  Negative for chills, fever, malaise/fatigue and weight loss.   HENT:  Negative for congestion, hearing loss, nosebleeds, sinus pain, sore throat and tinnitus.    Respiratory:  Positive for shortness of breath (exercise induced breathing). Negative for cough, sputum production and wheezing.    Cardiovascular:  Negative for chest pain, palpitations, orthopnea and leg swelling.   Gastrointestinal:  Positive for heartburn.        No dentures, no missing teeth, no difficulty swallowing    Neurological:  Negative for dizziness, tremors and headaches.   Psychiatric/Behavioral:  The patient does not have insomnia.        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       Family History   Problem Relation Age of Onset    Diabetes Mother     Cancer Mother         breast cancer    Hypertension Mother     Stroke Father     Cancer Other         prostate cancer    Psychiatric Illness Other         depression    Migraines Other     Hyperlipidemia Neg Hx        Social History     Socioeconomic History    Marital status:      Spouse name: Not on file    Number of children: Not on file    Years of education: Not on file    Highest education level: Bachelor's degree (e.g., BA, AB, BS)   Occupational History    Not on file   Tobacco Use    Smoking status: Never    Smokeless tobacco: Never   Vaping Use    Vaping Use: Never used   Substance and Sexual Activity    Alcohol use: Not Currently     Comment: Under 10 drinks/week    Drug use: Not Currently    Sexual activity: Yes     Partners: Female   Other Topics Concern    Not on file   Social History Narrative    Not on file     Social  Determinants of Health     Financial Resource Strain: Low Risk  (10/12/2022)    Overall Financial Resource Strain (CARDIA)     Difficulty of Paying Living Expenses: Not hard at all   Food Insecurity: No Food Insecurity (10/12/2022)    Hunger Vital Sign     Worried About Running Out of Food in the Last Year: Never true     Ran Out of Food in the Last Year: Never true   Transportation Needs: No Transportation Needs (10/12/2022)    PRAPARE - Transportation     Lack of Transportation (Medical): No     Lack of Transportation (Non-Medical): No   Physical Activity: Sufficiently Active (10/12/2022)    Exercise Vital Sign     Days of Exercise per Week: 7 days     Minutes of Exercise per Session: 60 min   Stress: No Stress Concern Present (10/12/2022)    [place] [employer] - Occupational Stress Questionnaire     Feeling of Stress : Only a little   Social Connections: Socially Integrated (10/12/2022)    Social Connection and Isolation Panel [NHANES]     Frequency of Communication with Friends and Family: More than three times a week     Frequency of Social Gatherings with Friends and Family: More than three times a week     Attends [employer]ism Services: More than 4 times per year     Active Member of Clubs or Organizations: Yes     Attends Club or Organization Meetings: More than 4 times per year     Marital Status:    Intimate Partner Violence: Not on file   Housing Stability: Low Risk  (10/12/2022)    Housing Stability Vital Sign     Unable to Pay for Housing in the Last Year: No     Number of Places Lived in the Last Year: 1     Unstable Housing in the Last Year: No       Allergies as of 06/29/2023    (No Known Allergies)          Current medications as of today   Current Outpatient Medications   Medication Sig Dispense Refill    hepatitis A-hepatitis B (TWINRIX) 720-20 ELU-MCG/ML injection Inject  into the shoulder, thigh, or buttocks. 1 mL 0    omeprazole (PRILOSEC) 20 MG delayed-release capsule  TAKE 1 CAPSULE BY MOUTH EVERY DAY 30 Capsule 1    amLODIPine (NORVASC) 2.5 MG Tab       albuterol 108 (90 Base) MCG/ACT Aero Soln inhalation aerosol INHALE 2 PUFFS EVERY FOUR HOURS AS NEEDED FOR SHORTNESS OF BREATH. 8.5 Each 1    rosuvastatin (CRESTOR) 5 MG Tab Take 1 Tablet by mouth every evening. 90 Tablet 3    fluoxetine (PROZAC) 40 MG capsule Take 1 Capsule by mouth every day. 90 Capsule 2    lisinopril (PRINIVIL) 40 MG tablet Take 1 Tablet by mouth every day. 100 Tablet 2    Loperamide HCl (IMODIUM PO) Take  by mouth.      cetirizine (ZYRTEC) 10 MG Tab Take 10 mg by mouth every day.      aspirin 81 MG tablet Take 81 mg by mouth every day.      Multiple Vitamins-Minerals (MULTIVITAMIN ADULT PO) Take 1 Tab by mouth every day.      Vitamin D, Ergocalciferol, 50 MCG (2000 UT) Cap Take 3 Tabs by mouth every day.      Saw Palmetto, Serenoa repens, (SAW PALMETTO PO) Take 320 mg by mouth every day.       No current facility-administered medications for this visit.         Physical Exam:   Gen:           Alert and oriented, No apparent distress. Mood and affect appropriate, normal interaction with examiner.   Hearing:     Grossly intact.  Nose:          Normal, no lesions or deformities.  Dentition:    Good dentition.   Oropharynx:   Tongue normal, posterior pharynx without erythema or exudate.  Mallampati Classification: III  Neck:        Supple, trachea midline, no masses.  Respiratory Effort: No intercostal retractions or use of accessory muscles.   Gait and Station: Normal.  Digits and Nails: No clubbing, cyanosis, petechiae, or nodes.   Skin:        No rashes, lesions or ulcers noted.               Ext:           No cyanosis or edema.      Immunizations:  Flu: 10/17/2022  Pneumovax 23: 7/1/2020  PCV 20: 10/17/2020  Pfizer SARS CoV2 Vaccine: 11/1/2021, 4/29/2021, 4/7/2021    Assessment / Plan:  1. FLO on CPAP  - DME Mask and Supplies    Reviewed equipment cleaning recommendations, reminded to use only distilled  water in humidifier chamber.  Reviewed recommended replacement supplies schedule.  We will send new order for mask and supplies to preferred.  Patient demonstrating excellent compliance and control of his sleep apnea with stated benefit.      Follow-up:   Return in about 1 year (around 6/29/2024) for Return with Agustina Manning PA-C.    Please note that this dictation was created using voice recognition software. I have made every reasonable attempt to correct obvious errors, but it is possible there are errors of grammar and possibly content that I did not discover before finalizing the note.

## 2023-06-29 NOTE — PATIENT INSTRUCTIONS
1-As a reminder use distilled water only in humidifier chamber.    2-Today we reviewed equipment cleaning  once weekly minimum  mask, tubing and water chamber  use dedicated container  use mild soap and water  SoClean or other ozone  are not recommended  white vinegar and water solution is no longer recommended  hang tubing to dry  mask sanitizing wipes are an option for use   3-Equipment replacement schedule : Mask cushion every month, Mask every 6 months, Head gear every 6 months, Tubing every 3 months, Ultra-fine filters 2 times per month, Humidifier chamber every 6 months  4-order to [facility] for mask and supplies  5-excellent compliance and controlled sleep apnea  5-follow up in one year

## 2023-07-06 ENCOUNTER — OFFICE VISIT (OUTPATIENT)
Dept: DERMATOLOGY | Facility: IMAGING CENTER | Age: 68
End: 2023-07-06
Payer: MEDICARE

## 2023-07-06 DIAGNOSIS — Z85.89 HISTORY OF SQUAMOUS CELL CARCINOMA: ICD-10-CM

## 2023-07-06 DIAGNOSIS — S30.1XXA TRAUMATIC ECCHYMOSIS OF ABDOMINAL WALL, INITIAL ENCOUNTER: ICD-10-CM

## 2023-07-06 DIAGNOSIS — H61.001 CHONDRODERMATITIS NODULARIS CHRONICA HELICIS, RIGHT: ICD-10-CM

## 2023-07-06 PROCEDURE — 99213 OFFICE O/P EST LOW 20 MIN: CPT | Performed by: DERMATOLOGY

## 2023-07-06 NOTE — PROGRESS NOTES
CC: CONSTANCE      Subjective: Prev seen patient here for f/u right ear. Pt is here today for crusty rt helix. Pt also would like to check a bruise on left torso, pt was playing pickle ball     Ear spot - has CPAP that rubs on/near site. Sidesleeper and rotates right/left    History of skin cancer: Yes, Details: SCC in-situ, Lt cheek- 2019  History of precancers/actinic keratoses: Yes, Details: leg and arm cryo by pcp  History of biopsies:Yes, Details: 2019- AK and SCC, Lt cheek, cyst- 2019 neck, abd 2011, 08/2022 Lt arm Dermofibroma   History of blistering/severe sunburns:Yes, Details: 30 years   Family history of skin cancer:Yes, Details: mom- non-melanoma  Family history of atypical moles:No    ROS: no fevers/chills. No itch.  No cough    Relevant PMH: mult med comorbidities  Social: NS    PE: Gen:WDWN male in NAD. Skin: focal exam  -1-2mm dome-shaped papule on r superior helix, NTTP no notable telangiectasias  -large patch of ecchymoses on left lateral waistband/abdomen.  NTTP      A/P:   Ecchymoses, abdomen:   -consistent with described pickleball injury  -anticipate resolution with time  -reviewed indications for imaging/further eval    Hx of skin cancer: NER  -cont'd sunprotection and skin cancer surveillance  -Q 6mo-annual exam recommended; f/u suspicious lesions PRN  -reports f/u Oct 2023    CHN, likely right helix:  -reviewed dx/tx  -to trial donut pillow between now and next visit  -declined bx today  -To RTC if grows/worsens/changes notably    I have reviewed medications relevant to my specialty.

## 2023-07-07 ENCOUNTER — DOCUMENTATION (OUTPATIENT)
Dept: HEALTH INFORMATION MANAGEMENT | Facility: OTHER | Age: 68
End: 2023-07-07
Payer: MEDICARE

## 2023-07-31 ENCOUNTER — DOCUMENTATION (OUTPATIENT)
Dept: HEALTH INFORMATION MANAGEMENT | Facility: OTHER | Age: 68
End: 2023-07-31
Payer: MEDICARE

## 2023-09-12 RX ORDER — AMLODIPINE BESYLATE 2.5 MG/1
2.5 TABLET ORAL
Qty: 90 TABLET | Refills: 0 | Status: SHIPPED | OUTPATIENT
Start: 2023-09-12 | End: 2023-12-05 | Stop reason: SDUPTHER

## 2023-09-25 DIAGNOSIS — F42.9 OBSESSIVE-COMPULSIVE DISORDER, UNSPECIFIED TYPE: ICD-10-CM

## 2023-09-25 DIAGNOSIS — F90.2 ATTENTION DEFICIT HYPERACTIVITY DISORDER (ADHD), COMBINED TYPE: ICD-10-CM

## 2023-09-25 RX ORDER — FLUOXETINE HYDROCHLORIDE 40 MG/1
40 CAPSULE ORAL
Qty: 90 CAPSULE | Refills: 2 | OUTPATIENT
Start: 2023-09-25

## 2023-09-25 NOTE — PROGRESS NOTES
Chief Complaint   Patient presents with    Hypertension     F/V Dx: Essential hypertension          Subjective:   Martin Chung III is a 68 y.o. male who presents today for follow-up.    Patient of Dr. Rodas.  Current medical problems include coronary artery calcification on statin therapy.    Other medical history:   Incomplete RBBB, incidental finding on ECG  FLO on CPAP  Essential hypertension    Chucky has been doing well. He continues to exercise, walking 10K-12K steps per day without any exertional symptoms. He is keeping his weight to 200lbs (lost 20lbs this past year intentionally).     He is tolerating the blood pressure medication and cholesterol medication well.     Likes to be proactive about his health.       Past Medical History:   Diagnosis Date    Chickenpox     Citizen of [place] measles     Mumps     Sleep apnea          Family History   Problem Relation Age of Onset    Diabetes Mother     Cancer Mother         breast cancer    Hypertension Mother     Stroke Father     Cancer Other         prostate cancer    Psychiatric Illness Other         depression    Migraines Other     Hyperlipidemia Neg Hx          Social History     Tobacco Use    Smoking status: Never    Smokeless tobacco: Never   Vaping Use    Vaping Use: Never used   Substance Use Topics    Alcohol use: Not Currently     Comment: Under 10 drinks/week    Drug use: Not Currently         No Known Allergies      Current Outpatient Medications   Medication Sig    amLODIPine (NORVASC) 2.5 MG Tab TAKE 1 TABLET BY MOUTH EVERY DAY    hepatitis A-hepatitis B (TWINRIX) 720-20 ELU-MCG/ML injection Inject  into the shoulder, thigh, or buttocks.    omeprazole (PRILOSEC) 20 MG delayed-release capsule TAKE 1 CAPSULE BY MOUTH EVERY DAY    albuterol 108 (90 Base) MCG/ACT Aero Soln inhalation aerosol INHALE 2 PUFFS EVERY FOUR HOURS AS NEEDED FOR SHORTNESS OF BREATH.    rosuvastatin (CRESTOR) 5 MG Tab Take 1 Tablet by mouth every evening.    fluoxetine  (PROZAC) 40 MG capsule Take 1 Capsule by mouth every day.    lisinopril (PRINIVIL) 40 MG tablet Take 1 Tablet by mouth every day.    Loperamide HCl (IMODIUM PO) Take  by mouth.    cetirizine (ZYRTEC) 10 MG Tab Take 10 mg by mouth every day.    aspirin 81 MG tablet Take 81 mg by mouth every day.    Multiple Vitamins-Minerals (MULTIVITAMIN ADULT PO) Take 1 Tab by mouth every day.    Vitamin D, Ergocalciferol, 50 MCG (2000 UT) Cap Take 3 Tabs by mouth every day.    Saw Palmetto, Serenoa repens, (SAW PALMETTO PO) Take 320 mg by mouth every day.         Review of Systems   Respiratory:  Negative for cough and shortness of breath.    Cardiovascular:  Negative for chest pain, palpitations, orthopnea, leg swelling and PND.   Gastrointestinal:  Negative for nausea.   Neurological:  Negative for dizziness and loss of consciousness.          Objective:   /72 (BP Location: Left arm, Patient Position: Sitting, BP Cuff Size: Adult)   Pulse (!) 53   Resp 14   Ht 1.829 m (6')   Wt 91.6 kg (202 lb)   SpO2 98%  Body mass index is 27.4 kg/m².         Physical Exam  Vitals reviewed.   HENT:      Head: Normocephalic and atraumatic.   Cardiovascular:      Rate and Rhythm: Normal rate and regular rhythm.      Heart sounds: No murmur heard.  Pulmonary:      Effort: Pulmonary effort is normal. No respiratory distress.      Breath sounds: No rales.   Abdominal:      General: There is no distension.   Musculoskeletal:      Right lower leg: No edema.      Left lower leg: No edema.   Skin:     General: Skin is warm.   Neurological:      General: No focal deficit present.      Mental Status: He is alert.      Gait: Gait normal.   Psychiatric:         Judgment: Judgment normal.       ETT 2/16/2023:  Negative stress ECG for ischemia  Fair exercise tolerance at 7.4 METS     Echo dated 10/19/2020:   Normal LV size and function, EF 65%  No regional wall motion abnormality        Radiology test Review:  CCS 2/2/2023:  Coronary  calcification:  LMA - 0.0  LCX - 0.0  LAD - 58.4  RCA - 0.0  PDA - 0.0     Total Calcium Score: 58.4  Percentile: Calcium score is above the 25th percentile for the patient's age and sex.     Assessment:     1. Essential hypertension        2. Dyslipidemia        3. Elevated coronary artery calcium score             Medical Decision Making:  Today's Assessment / Plan:   Primary Prevention  Coronary Artery Calcification  - continue rosuvastatin at 5mg, his LDL is well controlled.   - continue his antihypertensive regimen, lisinopril 40mg and amlodipine  - continue healthy lifestyle with at least 150min of moderate intensity exercise per week    The estimated 10-year risk of a CHD event for a person with this risk factor profile including coronary calcium is 6.5%. The estimated 10-year risk of a CHD event for a person with this risk factor profile if we did not factor in their coronary calcium score would be 6.9%.     Return in about 1 year (around 9/26/2024) for follow up with me.

## 2023-09-26 ENCOUNTER — OFFICE VISIT (OUTPATIENT)
Dept: CARDIOLOGY | Facility: MEDICAL CENTER | Age: 68
End: 2023-09-26
Attending: PHYSICIAN ASSISTANT
Payer: MEDICARE

## 2023-09-26 VITALS
RESPIRATION RATE: 14 BRPM | OXYGEN SATURATION: 98 % | HEIGHT: 72 IN | HEART RATE: 53 BPM | DIASTOLIC BLOOD PRESSURE: 72 MMHG | WEIGHT: 202 LBS | BODY MASS INDEX: 27.36 KG/M2 | SYSTOLIC BLOOD PRESSURE: 110 MMHG

## 2023-09-26 DIAGNOSIS — I10 ESSENTIAL HYPERTENSION: ICD-10-CM

## 2023-09-26 DIAGNOSIS — E78.5 DYSLIPIDEMIA: ICD-10-CM

## 2023-09-26 DIAGNOSIS — R93.1 ELEVATED CORONARY ARTERY CALCIUM SCORE: ICD-10-CM

## 2023-09-26 PROCEDURE — 99213 OFFICE O/P EST LOW 20 MIN: CPT | Performed by: PHYSICIAN ASSISTANT

## 2023-09-26 PROCEDURE — 99212 OFFICE O/P EST SF 10 MIN: CPT | Performed by: PHYSICIAN ASSISTANT

## 2023-09-26 PROCEDURE — 3078F DIAST BP <80 MM HG: CPT | Performed by: PHYSICIAN ASSISTANT

## 2023-09-26 PROCEDURE — 3074F SYST BP LT 130 MM HG: CPT | Performed by: PHYSICIAN ASSISTANT

## 2023-09-26 ASSESSMENT — ENCOUNTER SYMPTOMS
SHORTNESS OF BREATH: 0
COUGH: 0
DIZZINESS: 0
ORTHOPNEA: 0
LOSS OF CONSCIOUSNESS: 0
PALPITATIONS: 0
NAUSEA: 0
PND: 0

## 2023-09-26 ASSESSMENT — FIBROSIS 4 INDEX: FIB4 SCORE: 0.9

## 2023-09-27 ENCOUNTER — HOSPITAL ENCOUNTER (OUTPATIENT)
Dept: LAB | Facility: MEDICAL CENTER | Age: 68
End: 2023-09-27
Attending: FAMILY MEDICINE
Payer: MEDICARE

## 2023-09-27 DIAGNOSIS — E55.9 VITAMIN D DEFICIENCY: ICD-10-CM

## 2023-09-27 DIAGNOSIS — Z12.5 SCREENING FOR PROSTATE CANCER: ICD-10-CM

## 2023-09-27 DIAGNOSIS — R73.03 PREDIABETES: ICD-10-CM

## 2023-09-27 DIAGNOSIS — I10 PRIMARY HYPERTENSION: ICD-10-CM

## 2023-09-27 DIAGNOSIS — E78.5 HYPERLIPIDEMIA, UNSPECIFIED HYPERLIPIDEMIA TYPE: ICD-10-CM

## 2023-09-27 DIAGNOSIS — E04.2 NON-TOXIC MULTINODULAR GOITER: ICD-10-CM

## 2023-09-27 LAB
25(OH)D3 SERPL-MCNC: 54 NG/ML (ref 30–100)
ALBUMIN SERPL BCP-MCNC: 4.2 G/DL (ref 3.2–4.9)
ALBUMIN/GLOB SERPL: 1.7 G/DL
ALP SERPL-CCNC: 85 U/L (ref 30–99)
ALT SERPL-CCNC: 25 U/L (ref 2–50)
ANION GAP SERPL CALC-SCNC: 8 MMOL/L (ref 7–16)
AST SERPL-CCNC: 19 U/L (ref 12–45)
BASOPHILS # BLD AUTO: 0.6 % (ref 0–1.8)
BASOPHILS # BLD: 0.03 K/UL (ref 0–0.12)
BILIRUB SERPL-MCNC: 0.6 MG/DL (ref 0.1–1.5)
BUN SERPL-MCNC: 16 MG/DL (ref 8–22)
CALCIUM ALBUM COR SERPL-MCNC: 8.6 MG/DL (ref 8.5–10.5)
CALCIUM SERPL-MCNC: 8.8 MG/DL (ref 8.5–10.5)
CHLORIDE SERPL-SCNC: 104 MMOL/L (ref 96–112)
CHOLEST SERPL-MCNC: 101 MG/DL (ref 100–199)
CO2 SERPL-SCNC: 27 MMOL/L (ref 20–33)
CREAT SERPL-MCNC: 0.9 MG/DL (ref 0.5–1.4)
EOSINOPHIL # BLD AUTO: 0.14 K/UL (ref 0–0.51)
EOSINOPHIL NFR BLD: 2.9 % (ref 0–6.9)
ERYTHROCYTE [DISTWIDTH] IN BLOOD BY AUTOMATED COUNT: 40.2 FL (ref 35.9–50)
EST. AVERAGE GLUCOSE BLD GHB EST-MCNC: 108 MG/DL
FASTING STATUS PATIENT QL REPORTED: NORMAL
GFR SERPLBLD CREATININE-BSD FMLA CKD-EPI: 94 ML/MIN/1.73 M 2
GLOBULIN SER CALC-MCNC: 2.5 G/DL (ref 1.9–3.5)
GLUCOSE SERPL-MCNC: 93 MG/DL (ref 65–99)
HBA1C MFR BLD: 5.4 % (ref 4–5.6)
HCT VFR BLD AUTO: 41.1 % (ref 42–52)
HDLC SERPL-MCNC: 50 MG/DL
HGB BLD-MCNC: 13.9 G/DL (ref 14–18)
IMM GRANULOCYTES # BLD AUTO: 0.02 K/UL (ref 0–0.11)
IMM GRANULOCYTES NFR BLD AUTO: 0.4 % (ref 0–0.9)
LDLC SERPL CALC-MCNC: 40 MG/DL
LYMPHOCYTES # BLD AUTO: 0.99 K/UL (ref 1–4.8)
LYMPHOCYTES NFR BLD: 20.3 % (ref 22–41)
MCH RBC QN AUTO: 30.3 PG (ref 27–33)
MCHC RBC AUTO-ENTMCNC: 33.8 G/DL (ref 32.3–36.5)
MCV RBC AUTO: 89.5 FL (ref 81.4–97.8)
MONOCYTES # BLD AUTO: 0.54 K/UL (ref 0–0.85)
MONOCYTES NFR BLD AUTO: 11.1 % (ref 0–13.4)
NEUTROPHILS # BLD AUTO: 3.16 K/UL (ref 1.82–7.42)
NEUTROPHILS NFR BLD: 64.7 % (ref 44–72)
NRBC # BLD AUTO: 0 K/UL
NRBC BLD-RTO: 0 /100 WBC (ref 0–0.2)
PLATELET # BLD AUTO: 255 K/UL (ref 164–446)
PMV BLD AUTO: 9.5 FL (ref 9–12.9)
POTASSIUM SERPL-SCNC: 4.5 MMOL/L (ref 3.6–5.5)
PROT SERPL-MCNC: 6.7 G/DL (ref 6–8.2)
PSA SERPL-MCNC: 1.63 NG/ML (ref 0–4)
RBC # BLD AUTO: 4.59 M/UL (ref 4.7–6.1)
SODIUM SERPL-SCNC: 139 MMOL/L (ref 135–145)
TRIGL SERPL-MCNC: 56 MG/DL (ref 0–149)
TSH SERPL DL<=0.005 MIU/L-ACNC: 2.07 UIU/ML (ref 0.38–5.33)
WBC # BLD AUTO: 4.9 K/UL (ref 4.8–10.8)

## 2023-09-27 PROCEDURE — 85025 COMPLETE CBC W/AUTO DIFF WBC: CPT

## 2023-09-27 PROCEDURE — 84443 ASSAY THYROID STIM HORMONE: CPT

## 2023-09-27 PROCEDURE — 83036 HEMOGLOBIN GLYCOSYLATED A1C: CPT

## 2023-09-27 PROCEDURE — 80061 LIPID PANEL: CPT

## 2023-09-27 PROCEDURE — 84153 ASSAY OF PSA TOTAL: CPT

## 2023-09-27 PROCEDURE — 82306 VITAMIN D 25 HYDROXY: CPT

## 2023-09-27 PROCEDURE — 36415 COLL VENOUS BLD VENIPUNCTURE: CPT

## 2023-09-27 PROCEDURE — 80053 COMPREHEN METABOLIC PANEL: CPT

## 2023-10-13 ENCOUNTER — OFFICE VISIT (OUTPATIENT)
Dept: DERMATOLOGY | Facility: IMAGING CENTER | Age: 68
End: 2023-10-13
Payer: MEDICARE

## 2023-10-13 DIAGNOSIS — L90.8 SKIN AGING: ICD-10-CM

## 2023-10-13 DIAGNOSIS — L82.1 SEBORRHEIC KERATOSIS: ICD-10-CM

## 2023-10-13 DIAGNOSIS — D22.9 NEVUS: ICD-10-CM

## 2023-10-13 DIAGNOSIS — I10 HYPERTENSION, UNSPECIFIED TYPE: ICD-10-CM

## 2023-10-13 DIAGNOSIS — D23.9 DERMATOFIBROMA: ICD-10-CM

## 2023-10-13 DIAGNOSIS — Z85.89 HISTORY OF SQUAMOUS CELL CARCINOMA: ICD-10-CM

## 2023-10-13 DIAGNOSIS — Z12.83 SKIN CANCER SCREENING: ICD-10-CM

## 2023-10-13 DIAGNOSIS — L81.4 LENTIGO: ICD-10-CM

## 2023-10-13 PROCEDURE — 99212 OFFICE O/P EST SF 10 MIN: CPT | Performed by: DERMATOLOGY

## 2023-10-13 RX ORDER — LISINOPRIL 40 MG/1
40 TABLET ORAL
Qty: 100 TABLET | Refills: 0 | Status: SHIPPED | OUTPATIENT
Start: 2023-10-13 | End: 2023-12-05 | Stop reason: SDUPTHER

## 2023-10-13 NOTE — PROGRESS NOTES
CC:  6 month CONSTANCE      Subjective: prev seen patient here for skin check. 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 No itch.  No cough  Relevant PMH: mult med comorbidities  Social: NS    PE: Gen:WDWN male in NAD. Skin:Scalp/face/eyes/lips/neck/chest/back/arms/legs/hands/feet/buttocks- without suspicious lesions noted. Genitals exam declined.    -dome shaped papules on left mid thorax and left arm, appearing benign  -scattered hyperpigmented macules/papules, appearing benign on torso and extremities  -dome shaped papule on right lower leg, appearing benign      A/P:   DF/Nevi: benign appearing:  -RTC PRN growth/changes/concerning features  -to observe sites on left mid thorax and left arm for changes and RTC PRN suspicious features     Lentigos/SKs: benign  -reassurance  -RTC PRN growth/changes/concerning features     Hx of skin cancer: NER  -cont'd sunprotection and skin cancer surveillance  -Q 6mo-annual exam recommended; f/u suspicious lesions PRN       I have reviewed medications relevant to my specialty.

## 2023-10-30 ENCOUNTER — OFFICE VISIT (OUTPATIENT)
Dept: MEDICAL GROUP | Facility: PHYSICIAN GROUP | Age: 68
End: 2023-10-30
Payer: MEDICARE

## 2023-10-30 VITALS
RESPIRATION RATE: 18 BRPM | DIASTOLIC BLOOD PRESSURE: 74 MMHG | HEIGHT: 72 IN | BODY MASS INDEX: 27.37 KG/M2 | HEART RATE: 58 BPM | SYSTOLIC BLOOD PRESSURE: 126 MMHG | WEIGHT: 202.1 LBS | TEMPERATURE: 98.1 F | OXYGEN SATURATION: 98 %

## 2023-10-30 DIAGNOSIS — D64.9 LOW HEMOGLOBIN: ICD-10-CM

## 2023-10-30 DIAGNOSIS — E55.9 VITAMIN D DEFICIENCY: ICD-10-CM

## 2023-10-30 DIAGNOSIS — I10 PRIMARY HYPERTENSION: ICD-10-CM

## 2023-10-30 DIAGNOSIS — Z23 NEED FOR VACCINATION: ICD-10-CM

## 2023-10-30 DIAGNOSIS — N52.8 OTHER MALE ERECTILE DYSFUNCTION: ICD-10-CM

## 2023-10-30 DIAGNOSIS — E78.5 DYSLIPIDEMIA: ICD-10-CM

## 2023-10-30 DIAGNOSIS — K21.9 GASTROESOPHAGEAL REFLUX DISEASE WITHOUT ESOPHAGITIS: ICD-10-CM

## 2023-10-30 DIAGNOSIS — Z00.00 WELLNESS EXAMINATION: ICD-10-CM

## 2023-10-30 DIAGNOSIS — E04.2 NON-TOXIC MULTINODULAR GOITER: ICD-10-CM

## 2023-10-30 PROCEDURE — 3074F SYST BP LT 130 MM HG: CPT | Performed by: FAMILY MEDICINE

## 2023-10-30 PROCEDURE — 3078F DIAST BP <80 MM HG: CPT | Performed by: FAMILY MEDICINE

## 2023-10-30 PROCEDURE — 90662 IIV NO PRSV INCREASED AG IM: CPT | Performed by: FAMILY MEDICINE

## 2023-10-30 PROCEDURE — G0008 ADMIN INFLUENZA VIRUS VAC: HCPCS | Performed by: FAMILY MEDICINE

## 2023-10-30 PROCEDURE — 99214 OFFICE O/P EST MOD 30 MIN: CPT | Mod: 25 | Performed by: FAMILY MEDICINE

## 2023-10-30 ASSESSMENT — FIBROSIS 4 INDEX: FIB4 SCORE: 1

## 2023-10-30 NOTE — PROGRESS NOTES
Subjective:     CC:   Chief Complaint   Patient presents with    Follow-Up     Lab results        HPI:   Martin presents today for follow-up of his labs.  Patient did see sleep clinic and his CPAP machine is working well.  Patient did completely stop the omeprazole and is not having any further issues with heartburn.  Patient is having some issues with erectile dysfunction is wondering about getting a testosterone checked.    Past Medical History:   Diagnosis Date    Chickenpox     [place] measles     Mumps     Sleep apnea        Social History     Tobacco Use    Smoking status: Never    Smokeless tobacco: Never   Vaping Use    Vaping Use: Never used   Substance Use Topics    Alcohol use: Not Currently     Comment: states none 10/30/2023; Under 10 drinks/week    Drug use: Not Currently       Current Outpatient Medications Ordered in Epic   Medication Sig Dispense Refill    lisinopril (PRINIVIL) 40 MG tablet TAKE 1 TABLET BY MOUTH EVERY  Tablet 0    fluoxetine (PROZAC) 40 MG capsule TAKE 1 CAPSULE BY MOUTH EVERY DAY 90 Capsule 0    amLODIPine (NORVASC) 2.5 MG Tab TAKE 1 TABLET BY MOUTH EVERY DAY 90 Tablet 0    omeprazole (PRILOSEC) 20 MG delayed-release capsule TAKE 1 CAPSULE BY MOUTH EVERY DAY 30 Capsule 1    albuterol 108 (90 Base) MCG/ACT Aero Soln inhalation aerosol INHALE 2 PUFFS EVERY FOUR HOURS AS NEEDED FOR SHORTNESS OF BREATH. 8.5 Each 1    rosuvastatin (CRESTOR) 5 MG Tab Take 1 Tablet by mouth every evening. 90 Tablet 3    Loperamide HCl (IMODIUM PO) Take  by mouth.      cetirizine (ZYRTEC) 10 MG Tab Take 10 mg by mouth every day.      aspirin 81 MG tablet Take 81 mg by mouth every day.      Multiple Vitamins-Minerals (MULTIVITAMIN ADULT PO) Take 1 Tab by mouth every day.      Vitamin D, Ergocalciferol, 50 MCG (2000 UT) Cap Take 3 Tabs by mouth every day.      Saw Palmetto, Serenoa repens, (SAW PALMETTO PO) Take 320 mg by mouth every day.      hepatitis A-hepatitis B (TWINRIX) 720-20 ELU-MCG/ML  injection Inject  into the shoulder, thigh, or buttocks. (Patient not taking: Reported on 10/30/2023) 1 mL 0     No current Epic-ordered facility-administered medications on file.       Allergies:  Patient has no known allergies.    Health Maintenance: Completed    ROS:  Gen: no fevers/chills, no changes in weight  Eyes: no changes in vision  ENT: no sore throat, no hearing loss, no bloody nose  Pulm: no sob, no cough  CV: no chest pain, no palpitations  GI: no nausea/vomiting, no diarrhea  : no dysuria  Neuro: no headaches, no numbness/tingling  Heme/Lymph: no easy bruising    Objective:     Exam:  /74 (BP Location: Left arm, Patient Position: Sitting, BP Cuff Size: Adult)   Pulse (!) 58   Temp 36.7 °C (98.1 °F) (Temporal)   Resp 18   Ht 1.829 m (6')   Wt 91.7 kg (202 lb 1.6 oz)   SpO2 98%   BMI 27.41 kg/m²  Body mass index is 27.41 kg/m².    Gen: Alert and oriented, No apparent distress.  Skin: Warm and dry.  No obvious lesions.  Eyes: Sclera wnl Pupils normal in size  Lungs: Normal effort, CTA bilaterally, no wheezes, rhonchi, or rales  CV: Regular rate and rhythm. No murmurs, rubs, or gallops.  Musculoskeletal: Normal gait. No extremity cyanosis, clubbing, or edema.  Neuro: Oriented to person, place and time  Psych: Mood is wnl     Labs: Nonfasting labs were ordered    Assessment & Plan:     68 y.o. male with the following -     1. Low hemoglobin  Patient's hemoglobin is just slightly low but I 0.1 we will recheck this this could be lab error patient denies any black or bloody stools or any issues.  Patient states he is feeling well and not feeling tired.  - CBC WITH DIFFERENTIAL; Future    2. Other male erectile dysfunction  Patient would like to go ahead and get his testosterone checked we will go ahead and order this.  - TESTOSTERONE SERUM; Future    3. Primary hypertension  Patient's blood pressure looks in good range continue present medication  - CBC WITH DIFFERENTIAL; Future    4. Need for  vaccination  - Influenza Vaccine, High Dose (65+ Only)    5. Dyslipidemia  Patient's lipid panel is in good range continue present medication    6. Non-toxic multinodular goiter  Patient's TSH is within normal limits we will go ahead and order a thyroid ultrasound with his history of thyroid nodules in the past.  - US-THYROID; Future    7. Vitamin D deficiency  Patient's vitamin D is in good range    8. Gastroesophageal reflux disease without esophagitis  Patient has stopped taking omeprazole and feels like he is doing great we will continue to monitor this.    9. Wellness examination  - URINALYSIS,CULTURE IF INDICATED; Future       Return in about 4 weeks (around 11/27/2023), or if symptoms worsen or fail to improve.    Please note that this dictation was created using voice recognition software. I have made every reasonable attempt to correct obvious errors, but I expect that there are errors of grammar and possibly content that I did not discover before finalizing the note.

## 2023-11-01 ENCOUNTER — HOSPITAL ENCOUNTER (OUTPATIENT)
Dept: RADIOLOGY | Facility: MEDICAL CENTER | Age: 68
End: 2023-11-01
Attending: FAMILY MEDICINE
Payer: MEDICARE

## 2023-11-01 DIAGNOSIS — E04.2 NON-TOXIC MULTINODULAR GOITER: ICD-10-CM

## 2023-11-01 PROCEDURE — 76536 US EXAM OF HEAD AND NECK: CPT

## 2023-11-10 ENCOUNTER — APPOINTMENT (OUTPATIENT)
Dept: MEDICAL GROUP | Facility: PHYSICIAN GROUP | Age: 68
End: 2023-11-10
Payer: MEDICARE

## 2023-12-05 ENCOUNTER — OFFICE VISIT (OUTPATIENT)
Dept: MEDICAL GROUP | Facility: PHYSICIAN GROUP | Age: 68
End: 2023-12-05
Payer: MEDICARE

## 2023-12-05 VITALS
SYSTOLIC BLOOD PRESSURE: 108 MMHG | TEMPERATURE: 97.8 F | HEIGHT: 72 IN | OXYGEN SATURATION: 98 % | BODY MASS INDEX: 27.25 KG/M2 | RESPIRATION RATE: 18 BRPM | DIASTOLIC BLOOD PRESSURE: 64 MMHG | HEART RATE: 63 BPM | WEIGHT: 201.2 LBS

## 2023-12-05 DIAGNOSIS — I10 HYPERTENSION, UNSPECIFIED TYPE: ICD-10-CM

## 2023-12-05 DIAGNOSIS — D69.2 OTHER NONTHROMBOCYTOPENIC PURPURA (HCC): ICD-10-CM

## 2023-12-05 DIAGNOSIS — F42.9 OBSESSIVE-COMPULSIVE DISORDER, UNSPECIFIED TYPE: ICD-10-CM

## 2023-12-05 DIAGNOSIS — E78.5 DYSLIPIDEMIA: ICD-10-CM

## 2023-12-05 DIAGNOSIS — E04.2 NON-TOXIC MULTINODULAR GOITER: ICD-10-CM

## 2023-12-05 DIAGNOSIS — D64.9 LOW HEMOGLOBIN: ICD-10-CM

## 2023-12-05 DIAGNOSIS — N52.8 OTHER MALE ERECTILE DYSFUNCTION: ICD-10-CM

## 2023-12-05 PROCEDURE — 99214 OFFICE O/P EST MOD 30 MIN: CPT | Performed by: FAMILY MEDICINE

## 2023-12-05 PROCEDURE — 3074F SYST BP LT 130 MM HG: CPT | Performed by: FAMILY MEDICINE

## 2023-12-05 PROCEDURE — 3078F DIAST BP <80 MM HG: CPT | Performed by: FAMILY MEDICINE

## 2023-12-05 RX ORDER — AMLODIPINE BESYLATE 2.5 MG/1
2.5 TABLET ORAL
Qty: 90 TABLET | Refills: 1 | Status: SHIPPED | OUTPATIENT
Start: 2023-12-05 | End: 2024-03-13 | Stop reason: SDUPTHER

## 2023-12-05 RX ORDER — FLUOXETINE HYDROCHLORIDE 40 MG/1
40 CAPSULE ORAL
Qty: 90 CAPSULE | Refills: 1 | Status: SHIPPED | OUTPATIENT
Start: 2023-12-05

## 2023-12-05 RX ORDER — LISINOPRIL 40 MG/1
40 TABLET ORAL
Qty: 100 TABLET | Refills: 2 | Status: SHIPPED | OUTPATIENT
Start: 2023-12-05 | End: 2024-03-13 | Stop reason: SDUPTHER

## 2023-12-05 RX ORDER — ROSUVASTATIN CALCIUM 5 MG/1
5 TABLET, COATED ORAL EVERY EVENING
Qty: 90 TABLET | Refills: 3 | Status: SHIPPED | OUTPATIENT
Start: 2023-12-05 | End: 2024-03-13 | Stop reason: SDUPTHER

## 2023-12-05 ASSESSMENT — FIBROSIS 4 INDEX: FIB4 SCORE: 1

## 2023-12-05 NOTE — PROGRESS NOTES
Subjective:     CC:   Chief Complaint   Patient presents with    Follow-Up       HPI:   Martin presents today for follow-up of his thyroid ultrasound unfortunately patient did not get his labs done but states he will go ahead and get those done also.  Patient is feeling well and has no complaints.  Patient wanted me to also look at his ears to fact sometimes he gets a lot of earwax in them.    Past Medical History:   Diagnosis Date    Chickenpox     [place] measles     Mumps     Sleep apnea        Social History     Tobacco Use    Smoking status: Never    Smokeless tobacco: Never   Vaping Use    Vaping Use: Never used   Substance Use Topics    Alcohol use: Not Currently     Comment: states none 10/30/2023; Under 10 drinks/week    Drug use: Not Currently       Current Outpatient Medications Ordered in Epic   Medication Sig Dispense Refill    lisinopril (PRINIVIL) 40 MG tablet Take 1 Tablet by mouth every day. 100 Tablet 2    rosuvastatin (CRESTOR) 5 MG Tab Take 1 Tablet by mouth every evening. 90 Tablet 3    amLODIPine (NORVASC) 2.5 MG Tab Take 1 Tablet by mouth every day. 90 Tablet 1    fluoxetine (PROZAC) 40 MG capsule Take 1 Capsule by mouth every day. 90 Capsule 1    omeprazole (PRILOSEC) 20 MG delayed-release capsule TAKE 1 CAPSULE BY MOUTH EVERY DAY 30 Capsule 1    albuterol 108 (90 Base) MCG/ACT Aero Soln inhalation aerosol INHALE 2 PUFFS EVERY FOUR HOURS AS NEEDED FOR SHORTNESS OF BREATH. 8.5 Each 1    Loperamide HCl (IMODIUM PO) Take  by mouth.      cetirizine (ZYRTEC) 10 MG Tab Take 10 mg by mouth every day.      aspirin 81 MG tablet Take 81 mg by mouth every day.      Multiple Vitamins-Minerals (MULTIVITAMIN ADULT PO) Take 1 Tab by mouth every day.      Vitamin D, Ergocalciferol, 50 MCG (2000 UT) Cap Take 3 Tabs by mouth every day.      Saw Palmetto, Serenoa repens, (SAW PALMETTO PO) Take 320 mg by mouth every day.       No current [facility]-ordered facility-administered medications on file.        Allergies:  Furman    Health Maintenance: Completed    ROS:  Gen: no fevers/chills, no changes in weight  Eyes: no changes in vision  ENT: no sore throat, no hearing loss, no bloody nose  Pulm: no sob, no cough  CV: no chest pain, no palpitations  GI: no nausea/vomiting, no diarrhea  : no dysuria  Neuro: no headaches, no numbness/tingling  Heme/Lymph: no easy bruising    Objective:     Exam:  /64 (BP Location: Left arm, Patient Position: Sitting, BP Cuff Size: Adult)   Pulse 63   Temp 36.6 °C (97.8 °F) (Temporal)   Resp 18   Ht 1.829 m (6')   Wt 91.3 kg (201 lb 3.2 oz)   SpO2 98%   BMI 27.29 kg/m²  Body mass index is 27.29 kg/m².    Gen: Alert and oriented, No apparent distress.  Skin: Warm and dry.  No obvious lesions.  Eyes: Sclera wnl Pupils normal in size  ENT: Canals wnl and TM are not red, patient just has a small amount of earwax able to see both TMs with no problems  Lungs: Normal effort, CTA bilaterally, no wheezes, rhonchi, or rales  CV: Regular rate and rhythm. No murmurs, rubs, or gallops.  Musculoskeletal: Normal gait. No extremity cyanosis, clubbing, or edema.  Neuro: Oriented to person, place and time  Psych: Mood is wnl       Assessment & Plan:     68 y.o. male with the following -     1. Non-toxic multinodular goiter  Patient has 3 thyroid nodules 1 is mildly suspicious radiologist recommends repeat knee ultrasound in 1 year then 3 years and then 5 years I did inform patient of this he will put it under his calendar to follow-up with me prior to November 1, 2024    2. Low hemoglobin  Patient states he will go ahead get his lab work done and at this time patient would like me just to contact him via [email or web address] on the results.    3. Other nonthrombocytopenic purpura (HCC)  Patient is seeing dermatology for this    4. Other male erectile dysfunction  I will go ahead and check his testosterone which has to be done in the morning    5. Hypertension, unspecified type  Blood pressure is  at goal continue present medication  - lisinopril (PRINIVIL) 40 MG tablet; Take 1 Tablet by mouth every day.  Dispense: 100 Tablet; Refill: 2    6. Dyslipidemia  - rosuvastatin (CRESTOR) 5 MG Tab; Take 1 Tablet by mouth every evening.  Dispense: 90 Tablet; Refill: 3    7. Obsessive-compulsive disorder, unspecified type  - fluoxetine (PROZAC) 40 MG capsule; Take 1 Capsule by mouth every day.  Dispense: 90 Capsule; Refill: 1    Other orders  - amLODIPine (NORVASC) 2.5 MG Tab; Take 1 Tablet by mouth every day.  Dispense: 90 Tablet; Refill: 1     HCC Gap Form    Diagnosis to address: D69.2 - Other nonthrombocytopenic purpura (HCC)  Assessment and plan: Chronic, stable. Continue with current defined treatment plan: Patient is seeing dermatology regularly. Follow-up at least annually.  Last edited 12/05/23 15:22 PST by Massiel Youssef M.D.         Return in about 10 months (around 10/5/2024), or if symptoms worsen or fail to improve.    Please note that this dictation was created using voice recognition software. I have made every reasonable attempt to correct obvious errors, but I expect that there are errors of grammar and possibly content that I did not discover before finalizing the note.

## 2023-12-07 ENCOUNTER — HOSPITAL ENCOUNTER (OUTPATIENT)
Dept: LAB | Facility: MEDICAL CENTER | Age: 68
End: 2023-12-07
Attending: FAMILY MEDICINE
Payer: MEDICARE

## 2023-12-07 DIAGNOSIS — I10 PRIMARY HYPERTENSION: ICD-10-CM

## 2023-12-07 DIAGNOSIS — N52.8 OTHER MALE ERECTILE DYSFUNCTION: ICD-10-CM

## 2023-12-07 DIAGNOSIS — Z00.00 WELLNESS EXAMINATION: ICD-10-CM

## 2023-12-07 DIAGNOSIS — D64.9 LOW HEMOGLOBIN: ICD-10-CM

## 2023-12-07 LAB
APPEARANCE UR: CLEAR
BASOPHILS # BLD AUTO: 0.3 % (ref 0–1.8)
BASOPHILS # BLD: 0.02 K/UL (ref 0–0.12)
BILIRUB UR QL STRIP.AUTO: NEGATIVE
COLOR UR: YELLOW
EOSINOPHIL # BLD AUTO: 0.2 K/UL (ref 0–0.51)
EOSINOPHIL NFR BLD: 2.8 % (ref 0–6.9)
ERYTHROCYTE [DISTWIDTH] IN BLOOD BY AUTOMATED COUNT: 41.7 FL (ref 35.9–50)
GLUCOSE UR STRIP.AUTO-MCNC: NEGATIVE MG/DL
HCT VFR BLD AUTO: 43.2 % (ref 42–52)
HGB BLD-MCNC: 14.6 G/DL (ref 14–18)
IMM GRANULOCYTES # BLD AUTO: 0.03 K/UL (ref 0–0.11)
IMM GRANULOCYTES NFR BLD AUTO: 0.4 % (ref 0–0.9)
KETONES UR STRIP.AUTO-MCNC: NEGATIVE MG/DL
LEUKOCYTE ESTERASE UR QL STRIP.AUTO: NEGATIVE
LYMPHOCYTES # BLD AUTO: 0.81 K/UL (ref 1–4.8)
LYMPHOCYTES NFR BLD: 11.5 % (ref 22–41)
MCH RBC QN AUTO: 31 PG (ref 27–33)
MCHC RBC AUTO-ENTMCNC: 33.8 G/DL (ref 32.3–36.5)
MCV RBC AUTO: 91.7 FL (ref 81.4–97.8)
MICRO URNS: NORMAL
MONOCYTES # BLD AUTO: 0.72 K/UL (ref 0–0.85)
MONOCYTES NFR BLD AUTO: 10.2 % (ref 0–13.4)
NEUTROPHILS # BLD AUTO: 5.25 K/UL (ref 1.82–7.42)
NEUTROPHILS NFR BLD: 74.8 % (ref 44–72)
NITRITE UR QL STRIP.AUTO: NEGATIVE
NRBC # BLD AUTO: 0 K/UL
NRBC BLD-RTO: 0 /100 WBC (ref 0–0.2)
PH UR STRIP.AUTO: 6 [PH] (ref 5–8)
PLATELET # BLD AUTO: 245 K/UL (ref 164–446)
PMV BLD AUTO: 9.6 FL (ref 9–12.9)
PROT UR QL STRIP: NEGATIVE MG/DL
RBC # BLD AUTO: 4.71 M/UL (ref 4.7–6.1)
RBC UR QL AUTO: NEGATIVE
SP GR UR STRIP.AUTO: 1.01
TESTOST SERPL-MCNC: 292 NG/DL (ref 175–781)
UROBILINOGEN UR STRIP.AUTO-MCNC: 0.2 MG/DL
WBC # BLD AUTO: 7 K/UL (ref 4.8–10.8)

## 2023-12-07 PROCEDURE — 81003 URINALYSIS AUTO W/O SCOPE: CPT

## 2023-12-07 PROCEDURE — 36415 COLL VENOUS BLD VENIPUNCTURE: CPT

## 2023-12-07 PROCEDURE — 84403 ASSAY OF TOTAL TESTOSTERONE: CPT

## 2023-12-07 PROCEDURE — 85025 COMPLETE CBC W/AUTO DIFF WBC: CPT

## 2024-02-07 ENCOUNTER — OFFICE VISIT (OUTPATIENT)
Dept: CARDIOLOGY | Facility: MEDICAL CENTER | Age: 69
End: 2024-02-07
Attending: INTERNAL MEDICINE
Payer: MEDICARE

## 2024-02-07 ENCOUNTER — TELEPHONE (OUTPATIENT)
Dept: CARDIOLOGY | Facility: MEDICAL CENTER | Age: 69
End: 2024-02-07
Payer: MEDICARE

## 2024-02-07 VITALS
SYSTOLIC BLOOD PRESSURE: 136 MMHG | BODY MASS INDEX: 27.09 KG/M2 | HEIGHT: 72 IN | OXYGEN SATURATION: 98 % | DIASTOLIC BLOOD PRESSURE: 80 MMHG | RESPIRATION RATE: 18 BRPM | WEIGHT: 200 LBS | HEART RATE: 46 BPM

## 2024-02-07 DIAGNOSIS — I45.10 INCOMPLETE RIGHT BUNDLE BRANCH BLOCK (RBBB) DETERMINED BY ELECTROCARDIOGRAPHY: ICD-10-CM

## 2024-02-07 DIAGNOSIS — I10 ESSENTIAL HYPERTENSION: ICD-10-CM

## 2024-02-07 DIAGNOSIS — I20.0 UNSTABLE ANGINA (HCC): ICD-10-CM

## 2024-02-07 PROCEDURE — 99213 OFFICE O/P EST LOW 20 MIN: CPT | Performed by: INTERNAL MEDICINE

## 2024-02-07 PROCEDURE — 3079F DIAST BP 80-89 MM HG: CPT | Performed by: INTERNAL MEDICINE

## 2024-02-07 PROCEDURE — 99215 OFFICE O/P EST HI 40 MIN: CPT | Performed by: INTERNAL MEDICINE

## 2024-02-07 PROCEDURE — 3075F SYST BP GE 130 - 139MM HG: CPT | Performed by: INTERNAL MEDICINE

## 2024-02-07 RX ORDER — METOPROLOL SUCCINATE 50 MG/1
50 TABLET, EXTENDED RELEASE ORAL DAILY
Qty: 90 TABLET | Refills: 3 | Status: SHIPPED | OUTPATIENT
Start: 2024-02-07 | End: 2024-03-13 | Stop reason: SDUPTHER

## 2024-02-07 ASSESSMENT — FIBROSIS 4 INDEX: FIB4 SCORE: 1.04

## 2024-02-07 NOTE — PATIENT INSTRUCTIONS
A - Antiplatelet - Clopidogrel (PLAVIX) reduces your risk of cardiac event by 27% compared to Aspirin 81 mg daily (HOST EXAM study 2021), prasrugrel (EFFIENT), ticagrelor (BRILINTA)) may be used for the first year.  Aspirin 81 mg daily is associated with a 20% less use of heart event and is used the first year after a cardiac event, stent or CABG.  B - Blood Pressure Control - reduces your risk or heart attack and stroke, the goal is <130/80.  C - Cholesterol Management - statins dramatically reduce your risk; for those that are intolerant to statins, there are alternatives.  D - Diet - MEDITERRANEAN DIET or Cardiac rehab diets, [email or web address].  E - Exercise - at least 2.5 hours of moderate exercise weekly  (typical brisk walking or similar activity).  F - Fats - VASCEPA, or EPA Fish oil (if Vascepa too expensive) for elevated triglycerides (REDUCE IT trial showed reduction from 22% 5 year MACE to 17%).  G - Good Vibes - meditation, exercise, yoga, Pilates, mindfulness, Braxton-Chi, stress reduction.  H - Heart Failure - betablockers, sucubatril (ENTRESTO) 16% less risk of dying over 3 years, spironolactone, empagliflozin (JARDIANCE) 17% less risk of dying over 2 years, CRT +/- ICD.  I - Inflammation - Colchicine in the LoDoCo2 study in 2020 reduced the risk of heart attack by 30% in 2.5 year follow up.  R - Rehab - Cardiac Rehab reduces risk of dying by 13-24% and need to go to the hospital by 30% within the first year. Compared to regular Cardiac Rehab, Intensive Cardiac Rehab ([facility]) was shown to reduce the risk of major events 17 to 11% and hospitalization for CHF from 8% to 2%. (Nutrients [id number](97):2936)  S - Smoking - never smoke, if you do smoke ask for help to quit for good. Patients who quit smoking after heart attack have 36% less likely risk of dying.  Resources are 1-800-QUIT-NOW [employer] in addition to Chantix, bupropion (Zyban) or nicotine replacement  T - Type II Diabetes  - pills empagliflozin (JARDIANCE) 38% less risk of dying over 4 years, and/or weekly injections: tirzepatide (Mounjaro), semaglutide (Ozempic), liraglutide (Victoza), dulaglutide (Trulicity) ~26% less risk of MACE in 2 years.  V - Vaccines - Annual flu shot and COVID vaccine reduces the risk of serious cardiovascular complications from these deadly infections.  W - Weight - maintain a healthy weight. Semaglutide (WEGOVY) weekly injection was shown to reduce weight by 10% and heart events by 20% for patients with CAD and BMI > 27 in the SELECT trial (6.5% vs 8% in 48 month follow up [facility] 12/2023).      Work on at least 2.5 - 5 hours a week of moderate exercise    Please look into the following diets and incorporate them into your diet  LOW SALT DIET   KEEP YOUR SODIUM EQUAL TO CALORIES AND NO MORE THAN DOUBLE THE CALORIES FOR A LOW SALT DIET    Cardiosmart.org - great resource for American College of Cardiology on heart disease prevention and treatment    FOR TREATMENT OR PREVENTION OF CORONARY ARTERY DISEASE  These three programs are approved by Medicare/Insurers for those with heart disease  [place] [facility]  Dr. Rojas's Program for Reversing Heart Disease - [place] Siegel's Cardiologist vegetarian-based  [facility] - [place]-based mind-body Program    Mediterranean Diet has been shown to be a hearty healthy diet.    This is a commonly referenced Program  Dr Concepcion - [place] [facility] [place] (book and documentary) - vegetarian-based    FOR TREATMENT OF BLOOD PRESSURE  DASH DIET - American Heart Association for treatment of HYPERTENSION    FOR TREATMENT OF BAD CHOLESTEROL/FATS  REDUCE PROCESSED SUGAR AS MUCH AS POSSIBLE  INCREASE WHOLE GRAINS/VEGETABLES  INCREASE FIBER    Lowering total cholesterol and LDL (bad) cholesterol:  - Eat leaner cuts of meat, or eliminate altogether if possible red meat, and frequently substitute fish or chicken.  - Limit saturated  fat to no more than 7-10% of total calories no more than 10 g per day is recommended. Some sources of saturated fat include butter, animal fats, hydrogenated vegetable fats and oils, many desserts, whole milk dairy products.  - Replaced saturated fats with polyunsaturated fats and monounsaturated fats. Foods high in monounsaturated fat include nuts, canola oil, avocados, and olives.  - Limit trans fat (processed foods) and replaced with fresh fruits and vegetables  - Recommend nonfat dairy products  - Increase substantially the amount of soluble fiber intake (legumes such as beans, fruit, whole grains).  - Consider nutritional supplements: plant sterile spreads such as Benecol, fish oil,  flaxseed oil, omega-3 acids EPA capsules 2000 mg twice a day, or viscous fiber such as Metamucil  - Attain ideal weight and regular exercise (at least 30 minutes per day of moderate exercise)  ASK ABOUT STATIN OR NON STATIN MEDICATION TO REDUCE YOUR LDL AND HEART RISK    Lowering triglycerides:  - Reduce intake of simple sugar: Desserts, candy, pastries, honey, sodas, sugared cereals, yogurt, Gatorade, sports bars, canned fruit, smoothies, fruit juice, coffee drinks  - Reduced intake of refined starches: Refined Pasta, most bread  - Reduce or abstain from alcohol  - Increase omega-3 fatty acids: [place], [place]out, Mackerel, [place], [place] tuna and supplements  - Attain ideal weight and regular exercise (at least 30 minutes per day of moderate exercise)  ASK ABOUT PURIFIED OMEGA 3 EPA or FISH OIL TO REDUCE YOUR TG AND HEART RISK    Elevating HDL (good) cholesterol:  - Increase physical activity  - Increase omega-3 fatty acids and supplements as listed above  - Incorporating appropriate amounts of monounsaturated fats such as nuts, olive oil, canola oil, avocados, olives  - Stop smoking  - Attain ideal weight and regular exercise (at least 30 minutes per day of moderate exercise)

## 2024-02-07 NOTE — TELEPHONE ENCOUNTER
Patient is scheduled on 2-15-24 for a C w/poss with . No meds to stop and patient to check in at 10:30 for a 12:30 procedure. H&P was done on 2-7-24 by Dr. Khanna. Pre admit to call patient. Instruction sheet was emailed to patient per his request.

## 2024-02-07 NOTE — PROGRESS NOTES
Chief Complaint   Patient presents with    Hypertension    Other     RBBB       Subjective     Chucky Sky Chung III is a 68 y.o. male who presents today with CAC and HTN who is here to establish care with me previously seen Dr. 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     Family History   Problem Relation Age of Onset    Diabetes Mother     Cancer Mother         breast cancer    Hypertension Mother     Stroke Father     Cancer Other         prostate cancer    Psychiatric Illness Other         depression    Migraines Other     Hyperlipidemia Neg Hx      Social History     Socioeconomic History    Marital status:      Spouse name: Not on file    Number of children: Not on file    Years of education: Not on file    Highest education level: Bachelor's degree (e.g., BA, AB, BS)   Occupational History    Not on file   Tobacco Use    Smoking status: Never    Smokeless tobacco: Never   Vaping Use    Vaping Use: Never used   Substance and Sexual Activity    Alcohol use: Not Currently     Comment: states none 10/30/2023; Under 10 drinks/week    Drug use: Not Currently    Sexual activity: Yes     Partners: Female   Other Topics Concern    Not on file   Social History Narrative    Not on file     Social Determinants of Health     Financial Resource Strain: Low Risk  (10/12/2022)    Overall Financial Resource Strain (CARDIA)     Difficulty of Paying Living  Expenses: Not hard at all   Food Insecurity: No Food Insecurity (10/12/2022)    Hunger Vital Sign     Worried About Running Out of Food in the Last Year: Never true     Ran Out of Food in the Last Year: Never true   Transportation Needs: No Transportation Needs (10/12/2022)    PRAPARE - Transportation     Lack of Transportation (Medical): No     Lack of Transportation (Non-Medical): No   Physical Activity: Sufficiently Active (10/12/2022)    Exercise Vital Sign     Days of Exercise per Week: 7 days     Minutes of Exercise per Session: 60 min   Stress: No Stress Concern Present (10/12/2022)    [place] [employer] - Occupational Stress Questionnaire     Feeling of Stress : Only a little   Social Connections: Socially Integrated (10/12/2022)    Social Connection and Isolation Panel [NHANES]     Frequency of Communication with Friends and Family: More than three times a week     Frequency of Social Gatherings with Friends and Family: More than three times a week     Attends Zoroastrianism Services: More than 4 times per year     Active Member of Clubs or Organizations: Yes     Attends Club or Organization Meetings: More than 4 times per year     Marital Status:    Intimate Partner Violence: Not on file   Housing Stability: Low Risk  (10/12/2022)    Housing Stability Vital Sign     Unable to Pay for Housing in the Last Year: No     Number of Places Lived in the Last Year: 1     Unstable Housing in the Last Year: No     Allergies   Allergen Reactions    Thiells Unspecified     Outpatient Encounter Medications as of 2/7/2024   Medication Sig Dispense Refill    metoprolol SR (TOPROL XL) 50 MG TABLET SR 24 HR Take 1 Tablet by mouth every day. 90 Tablet 3    lisinopril (PRINIVIL) 40 MG tablet Take 1 Tablet by mouth every day. 100 Tablet 2    rosuvastatin (CRESTOR) 5 MG Tab Take 1 Tablet by mouth every evening. 90 Tablet 3    amLODIPine (NORVASC) 2.5 MG Tab Take 1 Tablet by mouth every day. 90 Tablet 1     fluoxetine (PROZAC) 40 MG capsule Take 1 Capsule by mouth every day. 90 Capsule 1    omeprazole (PRILOSEC) 20 MG delayed-release capsule TAKE 1 CAPSULE BY MOUTH EVERY DAY 30 Capsule 1    albuterol 108 (90 Base) MCG/ACT Aero Soln inhalation aerosol INHALE 2 PUFFS EVERY FOUR HOURS AS NEEDED FOR SHORTNESS OF BREATH. 8.5 Each 1    Loperamide HCl (IMODIUM PO) Take  by mouth.      cetirizine (ZYRTEC) 10 MG Tab Take 10 mg by mouth every day.      aspirin 81 MG tablet Take 81 mg by mouth every day.      Multiple Vitamins-Minerals (MULTIVITAMIN ADULT PO) Take 1 Tab by mouth every day.      Vitamin D, Ergocalciferol, 50 MCG (2000 UT) Cap Take 3 Tabs by mouth every day.      Saw Palmetto, Serenoa repens, (SAW PALMETTO PO) Take 320 mg by mouth every day.       No facility-administered encounter medications on file as of 2/7/2024.     ROS           Objective     /80 (BP Location: Left arm, Patient Position: Sitting, BP Cuff Size: Adult)   Pulse (!) 46   Resp 18   Ht 1.829 m (6')   Wt 90.7 kg (200 lb)   SpO2 98%   BMI 27.12 kg/m²     Physical Exam  Constitutional:       General: He is not in acute distress.     Appearance: He is not diaphoretic.   Eyes:      General: No scleral icterus.  Neck:      Vascular: No JVD.   Cardiovascular:      Rate and Rhythm: Normal rate.      Heart sounds: Normal heart sounds. No murmur heard.     No friction rub. No gallop.   Pulmonary:      Effort: No respiratory distress.      Breath sounds: No wheezing or rales.   Abdominal:      General: Bowel sounds are normal.      Palpations: Abdomen is soft.   Musculoskeletal:      Right lower leg: No edema.      Left lower leg: No edema.   Skin:     Findings: No rash.   Neurological:      Mental Status: He is alert. Mental status is at baseline.   Psychiatric:         Mood and Affect: Mood normal.            We reviewed in person the most recent labs  Recent Results (from the past 5040 hour(s))   HEMOGLOBIN A1C    Collection Time: 09/27/23   8:16 AM   Result Value Ref Range    Glycohemoglobin 5.4 4.0 - 5.6 %    Est Avg Glucose 108 mg/dL   PROSTATE SPECIFIC AG DIAGNOSTIC    Collection Time: 09/27/23  8:16 AM   Result Value Ref Range    Prostatic Specific Antigen Tot 1.63 0.00 - 4.00 ng/mL   VITAMIN D,25 HYDROXY (DEFICIENCY)    Collection Time: 09/27/23  8:16 AM   Result Value Ref Range    25-Hydroxy   Vitamin D 25 54 30 - 100 ng/mL   TSH    Collection Time: 09/27/23  8:16 AM   Result Value Ref Range    TSH 2.070 0.380 - 5.330 uIU/mL   Lipid Profile    Collection Time: 09/27/23  8:16 AM   Result Value Ref Range    Cholesterol,Tot 101 100 - 199 mg/dL    Triglycerides 56 0 - 149 mg/dL    HDL 50 >=40 mg/dL    LDL 40 <100 mg/dL   FASTING STATUS    Collection Time: 09/27/23  8:16 AM   Result Value Ref Range    Fasting Status Fasting    Comp Metabolic Panel    Collection Time: 09/27/23  8:16 AM   Result Value Ref Range    Sodium 139 135 - 145 mmol/L    Potassium 4.5 3.6 - 5.5 mmol/L    Chloride 104 96 - 112 mmol/L    Co2 27 20 - 33 mmol/L    Anion Gap 8.0 7.0 - 16.0    Glucose 93 65 - 99 mg/dL    Bun 16 8 - 22 mg/dL    Creatinine 0.90 0.50 - 1.40 mg/dL    Calcium 8.8 8.5 - 10.5 mg/dL    Correct Calcium 8.6 8.5 - 10.5 mg/dL    AST(SGOT) 19 12 - 45 U/L    ALT(SGPT) 25 2 - 50 U/L    Alkaline Phosphatase 85 30 - 99 U/L    Total Bilirubin 0.6 0.1 - 1.5 mg/dL    Albumin 4.2 3.2 - 4.9 g/dL    Total Protein 6.7 6.0 - 8.2 g/dL    Globulin 2.5 1.9 - 3.5 g/dL    A-G Ratio 1.7 g/dL   CBC WITH DIFFERENTIAL    Collection Time: 09/27/23  8:16 AM   Result Value Ref Range    WBC 4.9 4.8 - 10.8 K/uL    RBC 4.59 (L) 4.70 - 6.10 M/uL    Hemoglobin 13.9 (L) 14.0 - 18.0 g/dL    Hematocrit 41.1 (L) 42.0 - 52.0 %    MCV 89.5 81.4 - 97.8 fL    MCH 30.3 27.0 - 33.0 pg    MCHC 33.8 32.3 - 36.5 g/dL    RDW 40.2 35.9 - 50.0 fL    Platelet Count 255 164 - 446 K/uL    MPV 9.5 9.0 - 12.9 fL    Neutrophils-Polys 64.70 44.00 - 72.00 %    Lymphocytes 20.30 (L) 22.00 - 41.00 %    Monocytes 11.10  0.00 - 13.40 %    Eosinophils 2.90 0.00 - 6.90 %    Basophils 0.60 0.00 - 1.80 %    Immature Granulocytes 0.40 0.00 - 0.90 %    Nucleated RBC 0.00 0.00 - 0.20 /100 WBC    Neutrophils (Absolute) 3.16 1.82 - 7.42 K/uL    Lymphs (Absolute) 0.99 (L) 1.00 - 4.80 K/uL    Monos (Absolute) 0.54 0.00 - 0.85 K/uL    Eos (Absolute) 0.14 0.00 - 0.51 K/uL    Baso (Absolute) 0.03 0.00 - 0.12 K/uL    Immature Granulocytes (abs) 0.02 0.00 - 0.11 K/uL    NRBC (Absolute) 0.00 K/uL   ESTIMATED GFR    Collection Time: 09/27/23  8:16 AM   Result Value Ref Range    GFR (CKD-EPI) 94 >60 mL/min/1.73 m 2   TESTOSTERONE SERUM    Collection Time: 12/07/23  8:21 AM   Result Value Ref Range    Testosterone,Total 292 175 - 781 ng/dL   CBC WITH DIFFERENTIAL    Collection Time: 12/07/23  8:21 AM   Result Value Ref Range    WBC 7.0 4.8 - 10.8 K/uL    RBC 4.71 4.70 - 6.10 M/uL    Hemoglobin 14.6 14.0 - 18.0 g/dL    Hematocrit 43.2 42.0 - 52.0 %    MCV 91.7 81.4 - 97.8 fL    MCH 31.0 27.0 - 33.0 pg    MCHC 33.8 32.3 - 36.5 g/dL    RDW 41.7 35.9 - 50.0 fL    Platelet Count 245 164 - 446 K/uL    MPV 9.6 9.0 - 12.9 fL    Neutrophils-Polys 74.80 (H) 44.00 - 72.00 %    Lymphocytes 11.50 (L) 22.00 - 41.00 %    Monocytes 10.20 0.00 - 13.40 %    Eosinophils 2.80 0.00 - 6.90 %    Basophils 0.30 0.00 - 1.80 %    Immature Granulocytes 0.40 0.00 - 0.90 %    Nucleated RBC 0.00 0.00 - 0.20 /100 WBC    Neutrophils (Absolute) 5.25 1.82 - 7.42 K/uL    Lymphs (Absolute) 0.81 (L) 1.00 - 4.80 K/uL    Monos (Absolute) 0.72 0.00 - 0.85 K/uL    Eos (Absolute) 0.20 0.00 - 0.51 K/uL    Baso (Absolute) 0.02 0.00 - 0.12 K/uL    Immature Granulocytes (abs) 0.03 0.00 - 0.11 K/uL    NRBC (Absolute) 0.00 K/uL   URINALYSIS,CULTURE IF INDICATED    Collection Time: 12/07/23  8:21 AM    Specimen: Urine   Result Value Ref Range    Color Yellow     Character Clear     Specific Gravity 1.013 <1.035    Ph 6.0 5.0 - 8.0    Glucose Negative Negative mg/dL    Ketones Negative Negative mg/dL     Protein Negative Negative mg/dL    Bilirubin Negative Negative    Urobilinogen, Urine 0.2 Negative    Nitrite Negative Negative    Leukocyte Esterase Negative Negative    Occult Blood Negative Negative    Micro Urine Req see below          Assessment & Plan     1. Essential hypertension  metoprolol SR (TOPROL XL) 50 MG TABLET SR 24 HR      2. Unstable angina (HCC)  CL-LEFT HEART CATHETERIZATION WITH POSSIBLE INTERVENTION      3. Incomplete right bundle branch block (RBBB) determined by electrocardiography            Medical Decision Making: Today's Assessment/Status/Plan:        It was my pleasure to meet with Mr. Sheldon WISE.    We addressed the management of hypertension at today's visit. Blood pressure is well controlled.  We specifically assessed the labs on hypertension treatment    We addressed the management of dyslipidemia and atherosclerosis at today's visit. He is on appropriate statin.    We addressed the management of atherosclerotic artery disease.  He is on proper antiplatelet, cholesterol management and beta-blockers as appropriate.  We addressed the potential side effects and laboratory follow-up for these medications.    His symptoms are concerning for very typical for angina and given he has been on at least 2 antianginal agents this is quite worrisome he did have a low calcium score but he could have soft plaque that has progressed to cause the current symptoms we discussed the merits of doing an angiogram with possible coronary intervention and he agrees to proceed in the interim I added metoprolol and I encouraged him to limit activity to not cause recurrence of angina and if he develops symptoms at rest to go to the emergency room for expedited evaluation    I will see Mr. Sheldon WISE back in 1 month time and encouraged him to follow up with us over the phone or electronically using my MyChart as issues arise.    It is my pleasure to participate in the care of Mr. Sheldon WISE.  Please do not  hesitate to contact me with questions or concerns.    Elliot Khanna MD PhD [facility]  Cardiologist [facility]    Please note that this dictation was created using voice recognition software. There may be errors I did not discover before finalizing the note.       Mr. Sheldon WISE's care is highly complex due to high risk diagnosis with either severe exacerbation, progression, or side effects of treatment. We specifically discussed the need for high risk medication requiring at least quarterly testing and/or made a decision on elective/emergent major surgery with identified patient or procedure risk factors specific to Mr. Sheldon WISE. I have personally spent extra time in discussion about these facts with Mr. Sheldon WISE and reviewed and or ordered at least 3 tests, documents or other physician/JEREMY reports available including labs, imaging and EKGs as appropriate separate from today's encounter.  I have reviewed images with Mr. Sheldon WISE and personally interpreted on this encounter day the referenced EKG, echocardiogram, stress tests, CT scan, cardiac catheterization or other cardiac imaging and my personal interpretation is what is specifically stated in this note.

## 2024-02-08 ENCOUNTER — APPOINTMENT (OUTPATIENT)
Dept: ADMISSIONS | Facility: MEDICAL CENTER | Age: 69
End: 2024-02-08
Attending: INTERNAL MEDICINE
Payer: MEDICARE

## 2024-02-13 ENCOUNTER — APPOINTMENT (OUTPATIENT)
Dept: ADMISSIONS | Facility: MEDICAL CENTER | Age: 69
End: 2024-02-13
Attending: INTERNAL MEDICINE
Payer: MEDICARE

## 2024-02-13 DIAGNOSIS — Z01.812 PRE-OPERATIVE LABORATORY EXAMINATION: ICD-10-CM

## 2024-02-13 DIAGNOSIS — Z01.810 PRE-OPERATIVE CARDIOVASCULAR EXAMINATION: ICD-10-CM

## 2024-02-13 LAB
ALBUMIN SERPL BCP-MCNC: 4.2 G/DL (ref 3.2–4.9)
ALBUMIN/GLOB SERPL: 1.8 G/DL
ALP SERPL-CCNC: 80 U/L (ref 30–99)
ALT SERPL-CCNC: 47 U/L (ref 2–50)
ANION GAP SERPL CALC-SCNC: 8 MMOL/L (ref 7–16)
APTT PPP: 28.9 SEC (ref 24.7–36)
AST SERPL-CCNC: 26 U/L (ref 12–45)
BILIRUB SERPL-MCNC: 0.4 MG/DL (ref 0.1–1.5)
BUN SERPL-MCNC: 17 MG/DL (ref 8–22)
CALCIUM ALBUM COR SERPL-MCNC: 8.7 MG/DL (ref 8.5–10.5)
CALCIUM SERPL-MCNC: 8.9 MG/DL (ref 8.5–10.5)
CHLORIDE SERPL-SCNC: 102 MMOL/L (ref 96–112)
CO2 SERPL-SCNC: 29 MMOL/L (ref 20–33)
CREAT SERPL-MCNC: 0.93 MG/DL (ref 0.5–1.4)
EKG IMPRESSION: NORMAL
ERYTHROCYTE [DISTWIDTH] IN BLOOD BY AUTOMATED COUNT: 40.2 FL (ref 35.9–50)
GFR SERPLBLD CREATININE-BSD FMLA CKD-EPI: 90 ML/MIN/1.73 M 2
GLOBULIN SER CALC-MCNC: 2.4 G/DL (ref 1.9–3.5)
GLUCOSE SERPL-MCNC: 91 MG/DL (ref 65–99)
HCT VFR BLD AUTO: 41.5 % (ref 42–52)
HGB BLD-MCNC: 14.3 G/DL (ref 14–18)
INR PPP: 0.99 (ref 0.87–1.13)
MCH RBC QN AUTO: 30.8 PG (ref 27–33)
MCHC RBC AUTO-ENTMCNC: 34.5 G/DL (ref 32.3–36.5)
MCV RBC AUTO: 89.2 FL (ref 81.4–97.8)
PLATELET # BLD AUTO: 242 K/UL (ref 164–446)
PMV BLD AUTO: 9.5 FL (ref 9–12.9)
POTASSIUM SERPL-SCNC: 4.6 MMOL/L (ref 3.6–5.5)
PROT SERPL-MCNC: 6.6 G/DL (ref 6–8.2)
PROTHROMBIN TIME: 13.2 SEC (ref 12–14.6)
RBC # BLD AUTO: 4.65 M/UL (ref 4.7–6.1)
SODIUM SERPL-SCNC: 139 MMOL/L (ref 135–145)
WBC # BLD AUTO: 5.6 K/UL (ref 4.8–10.8)

## 2024-02-13 PROCEDURE — 85730 THROMBOPLASTIN TIME PARTIAL: CPT

## 2024-02-13 PROCEDURE — 80053 COMPREHEN METABOLIC PANEL: CPT

## 2024-02-13 PROCEDURE — 85027 COMPLETE CBC AUTOMATED: CPT

## 2024-02-13 PROCEDURE — 36415 COLL VENOUS BLD VENIPUNCTURE: CPT

## 2024-02-13 PROCEDURE — 93010 ELECTROCARDIOGRAM REPORT: CPT | Performed by: INTERNAL MEDICINE

## 2024-02-13 PROCEDURE — 85610 PROTHROMBIN TIME: CPT

## 2024-02-13 PROCEDURE — 93005 ELECTROCARDIOGRAM TRACING: CPT

## 2024-02-15 ENCOUNTER — HOSPITAL ENCOUNTER (OUTPATIENT)
Facility: MEDICAL CENTER | Age: 69
End: 2024-02-15
Attending: INTERNAL MEDICINE | Admitting: INTERNAL MEDICINE
Payer: MEDICARE

## 2024-02-15 ENCOUNTER — APPOINTMENT (OUTPATIENT)
Dept: CARDIOLOGY | Facility: MEDICAL CENTER | Age: 69
End: 2024-02-15
Attending: INTERNAL MEDICINE
Payer: MEDICARE

## 2024-02-15 VITALS
OXYGEN SATURATION: 97 % | RESPIRATION RATE: 16 BRPM | WEIGHT: 203.26 LBS | HEART RATE: 41 BPM | BODY MASS INDEX: 28.46 KG/M2 | SYSTOLIC BLOOD PRESSURE: 151 MMHG | HEIGHT: 71 IN | TEMPERATURE: 98.5 F | DIASTOLIC BLOOD PRESSURE: 74 MMHG

## 2024-02-15 DIAGNOSIS — I20.0 UNSTABLE ANGINA (HCC): ICD-10-CM

## 2024-02-15 LAB
ACT BLD: 266 SEC (ref 74–137)
EKG IMPRESSION: NORMAL

## 2024-02-15 PROCEDURE — 160035 HCHG PACU - 1ST 60 MINS PHASE I

## 2024-02-15 PROCEDURE — 99152 MOD SED SAME PHYS/QHP 5/>YRS: CPT | Performed by: INTERNAL MEDICINE

## 2024-02-15 PROCEDURE — 700111 HCHG RX REV CODE 636 W/ 250 OVERRIDE (IP)

## 2024-02-15 PROCEDURE — 93458 L HRT ARTERY/VENTRICLE ANGIO: CPT | Mod: 26,59 | Performed by: INTERNAL MEDICINE

## 2024-02-15 PROCEDURE — A9270 NON-COVERED ITEM OR SERVICE: HCPCS

## 2024-02-15 PROCEDURE — 160002 HCHG RECOVERY MINUTES (STAT)

## 2024-02-15 PROCEDURE — 700117 HCHG RX CONTRAST REV CODE 255: Performed by: INTERNAL MEDICINE

## 2024-02-15 PROCEDURE — 85347 COAGULATION TIME ACTIVATED: CPT

## 2024-02-15 PROCEDURE — 160046 HCHG PACU - 1ST 60 MINS PHASE II

## 2024-02-15 PROCEDURE — 700101 HCHG RX REV CODE 250

## 2024-02-15 PROCEDURE — 99153 MOD SED SAME PHYS/QHP EA: CPT

## 2024-02-15 PROCEDURE — 93010 ELECTROCARDIOGRAM REPORT: CPT | Mod: 59 | Performed by: INTERNAL MEDICINE

## 2024-02-15 PROCEDURE — 160047 HCHG PACU  - EA ADDL 30 MINS PHASE II

## 2024-02-15 PROCEDURE — 93005 ELECTROCARDIOGRAM TRACING: CPT | Performed by: INTERNAL MEDICINE

## 2024-02-15 PROCEDURE — 700102 HCHG RX REV CODE 250 W/ 637 OVERRIDE(OP)

## 2024-02-15 PROCEDURE — 92928 PRQ TCAT PLMT NTRAC ST 1 LES: CPT | Mod: LD | Performed by: INTERNAL MEDICINE

## 2024-02-15 RX ORDER — HEPARIN SODIUM 200 [USP'U]/100ML
INJECTION, SOLUTION INTRAVENOUS
Status: COMPLETED
Start: 2024-02-15 | End: 2024-02-15

## 2024-02-15 RX ORDER — VERAPAMIL HYDROCHLORIDE 2.5 MG/ML
INJECTION, SOLUTION INTRAVENOUS
Status: COMPLETED
Start: 2024-02-15 | End: 2024-02-15

## 2024-02-15 RX ORDER — CLOPIDOGREL 300 MG/1
TABLET, FILM COATED ORAL
Status: COMPLETED
Start: 2024-02-15 | End: 2024-02-15

## 2024-02-15 RX ORDER — CLOPIDOGREL BISULFATE 75 MG/1
75 TABLET ORAL DAILY
Status: DISCONTINUED | OUTPATIENT
Start: 2024-02-16 | End: 2024-02-15 | Stop reason: HOSPADM

## 2024-02-15 RX ORDER — HEPARIN SODIUM 1000 [USP'U]/ML
INJECTION, SOLUTION INTRAVENOUS; SUBCUTANEOUS
Status: COMPLETED
Start: 2024-02-15 | End: 2024-02-15

## 2024-02-15 RX ORDER — MIDAZOLAM HYDROCHLORIDE 1 MG/ML
INJECTION INTRAMUSCULAR; INTRAVENOUS
Status: COMPLETED
Start: 2024-02-15 | End: 2024-02-15

## 2024-02-15 RX ORDER — LIDOCAINE HYDROCHLORIDE 20 MG/ML
INJECTION, SOLUTION INFILTRATION; PERINEURAL
Status: COMPLETED
Start: 2024-02-15 | End: 2024-02-15

## 2024-02-15 RX ORDER — SODIUM CHLORIDE 9 MG/ML
INJECTION, SOLUTION INTRAVENOUS ONCE
Status: DISCONTINUED | OUTPATIENT
Start: 2024-02-15 | End: 2024-02-15 | Stop reason: HOSPADM

## 2024-02-15 RX ORDER — CLOPIDOGREL 300 MG/1
600 TABLET, FILM COATED ORAL ONCE
Status: DISCONTINUED | OUTPATIENT
Start: 2024-02-15 | End: 2024-02-15

## 2024-02-15 RX ORDER — CLOPIDOGREL BISULFATE 75 MG/1
75 TABLET ORAL DAILY
Qty: 90 TABLET | Refills: 1 | Status: SHIPPED | OUTPATIENT
Start: 2024-02-15 | End: 2024-03-13 | Stop reason: SDUPTHER

## 2024-02-15 RX ORDER — SODIUM CHLORIDE 9 MG/ML
3 INJECTION, SOLUTION INTRAVENOUS CONTINUOUS
Status: DISCONTINUED | OUTPATIENT
Start: 2024-02-15 | End: 2024-02-15 | Stop reason: HOSPADM

## 2024-02-15 RX ORDER — ASPIRIN 81 MG/1
81 TABLET ORAL DAILY
Status: DISCONTINUED | OUTPATIENT
Start: 2024-02-15 | End: 2024-02-15 | Stop reason: HOSPADM

## 2024-02-15 RX ADMIN — NITROGLYCERIN 10 ML: 20 INJECTION INTRAVENOUS at 14:09

## 2024-02-15 RX ADMIN — FENTANYL CITRATE 100 MCG: 50 INJECTION, SOLUTION INTRAMUSCULAR; INTRAVENOUS at 14:10

## 2024-02-15 RX ADMIN — LIDOCAINE HYDROCHLORIDE: 20 INJECTION, SOLUTION INFILTRATION; PERINEURAL at 14:08

## 2024-02-15 RX ADMIN — HEPARIN SODIUM 2000 UNITS: 200 INJECTION, SOLUTION INTRAVENOUS at 14:09

## 2024-02-15 RX ADMIN — VERAPAMIL HYDROCHLORIDE 2.5 MG: 2.5 INJECTION, SOLUTION INTRAVENOUS at 14:09

## 2024-02-15 RX ADMIN — IOHEXOL 64 ML: 350 INJECTION, SOLUTION INTRAVENOUS at 14:17

## 2024-02-15 RX ADMIN — HEPARIN SODIUM: 1000 INJECTION, SOLUTION INTRAVENOUS; SUBCUTANEOUS at 14:08

## 2024-02-15 RX ADMIN — CLOPIDOGREL BISULFATE 600 MG: 300 TABLET, FILM COATED ORAL at 14:26

## 2024-02-15 RX ADMIN — MIDAZOLAM HYDROCHLORIDE 2 MG: 1 INJECTION, SOLUTION INTRAMUSCULAR; INTRAVENOUS at 14:09

## 2024-02-15 RX ADMIN — HEPARIN SODIUM: 1000 INJECTION, SOLUTION INTRAVENOUS; SUBCUTANEOUS at 14:09

## 2024-02-15 ASSESSMENT — FIBROSIS 4 INDEX: FIB4 SCORE: 1.05

## 2024-02-15 NOTE — PROCEDURES
Cardiac Catheterization and Percutaneous Intervention Procedure Report    2/15/2024    Referring MD:     Primary Care Provider: Massiel Youssef M.D.    Indication for procedure: Unstable angina    Procedures:  Right and left coronary arteriograms  Left heart catheterization  Angioplasty and placement of a 2.5 by 8mm Synergy drug-eluting stent in mid  diagonal coronary artery.      Recommendations: Guideline directed medical therapy and risk factor management      Coronary arteriograms:  Left main: normal  Left anterior descending: luminal irregularities.  Large diagonal branch has eccentric 70% hazy stenosis better seen in the caudal views.  Left circumflex: Large dominant vessel, luminal irregularities no significant disease  Right coronary: Small nondominant artery.    Left Heart Catheterization:  Left Ventriculogram: Not performed  Left Ventricular EDP: 18 mm Hg   Aortic Valve Gradient: No significant AV gradient noted    Procedure details:  Martin Chung III was brought to the cardiac catheterization lab where the right wrist was prepped and draped in the usual manner for cardiac catheterization.  Timeout was performed.  The area was anesthetized with lidocaine and a 5/6 FR sheath was inserted into the right radial artery without difficulty. A #3.5 left Tremaine catheter was advanced to the ostia of the Left coronary artery and arteriograms were recorded.   A #4 right Tremaine catheter was advanced to the ostia of the right coronary artery and arteriograms were recorded. Aortic valve was crossed using #4 right Tremaine catheter left heart catheterization was performed.  Patient underwent percutaneous coronary revascularization as outlined below.  At the completion of the case the sheath was removed and hemostasis achieved utilizing a radial compression band .  Patient was pain-free and hemodynamically stable at the completion of the case.  There were no apparent complications.    Interventional  "Procedure:   His diagonal lesion is not critical but it was hazy, about 70% stenosis in caudal views.  Patient has escalating angina.  He is already taking 2 antianginal medications at home amlodipine, metoprolol.  Given hazy appearance of this lesion, worsening symptoms, I felt this is likely  culprit.  So, we proceeded with PCI.    Indication for PCI:  Unstable angina    Pre: 70 %, 5 mm length, MEREDITH 3 flow  Post: 0%, MEREDITH 3 flow    Lesion complexity  Non-High  Severe calcification No  Bifurcation  No    Guide catheter: EBU 3.75 was advanced to the ostia of the left coronary artery.    Guide wire: A 0.014\" mm  Runthrough was advanced into the artery and crossed the lesion.    Stent: A 2.5 by 8 mm Synergy drug-eluting  stent was deployed in mid  diagonal coronary artery at 16 ANGELIC.      Anticoagulant: Heparin  Antiplatelet: clopidogrel  EBL <25 cc  Complications: none  Specimens: none  Contrast: 64 cc  Fluorotime : see cath lab flowsheet      Sedation: I supervised moderate sedation over a trained independent observer.    Sedation start time: 13:46  End time: 14:17      Electronically signed by   Brandon Alvarenga M.D., RAFAEL  Interventional cardiologist  2/15/2024  2:27 PM          "

## 2024-02-15 NOTE — DISCHARGE INSTRUCTIONS
POST ANGIOGRAM  General Care Instructions  Maintain a bandage over the incision site for 24 hours.  It's normal to find a small bruise or dime-sized lump at the insertion site. This should disappear within a few weeks.  Do not apply lotions or powders to the site.  Do not immerse the catheter insertion site in water (bathtub/swimming) for five days. It is ok to shower 24 hours after the procedure.  You may resume your normal diet immediately; on the day of your procedure, drink 6-10 glasses of water to help flush the contrast liquid out of your system.  If the doctor inserted the catheter in through your groin:  Walking short distances on a flat surface is OK. Limit going up/down stairs for the first 2 days.  DO NOT do yard work, drive, squat, lift heavy objects, or play sports for 2 days; or until your health care provider tells you it is OK.  If the doctor inserted the catheter in your arm:  For 24 hours, DO NOT lift anything heavier than 10 pounds (approximately a gallon of milk). DO NOT do any heavy pushing, pulling, or twisting.    Medications  If your current medications need to be changed, you will be provided with an updated list of your medications prior to discharge.  If you take warfarin (Coumadin), resume taking your usual dose the evening after the procedure.  DO NOT STOP taking prescribed blood thinning (anti-platelet) medications unless instructed by your cardiologist.  These medications include:  Aspirin, Clopidogrel (Plavix), Ticagrelor (Brilinta), or Prasugrel (Effient)   If you take one of the following anticoagulants, RESUME 24 HOURS after your procedure:  Apixiban (Eliquis), Rivaroxaban (Xarelto), Dabigatran (Pradaxa), Edoxaban (Savaysa)  If you take metformin (Glucophage), RESUME 48 HOURS after your procedure.    When to call your healthcare provider  Call your cardiologist right away at [phone] if you have any of the following:   Problems/Concerns taking any of your prescribed heart  medicines.   The insertion site has increasing pain, swelling, redness, bleeding, or drainage.   Your arm or leg below where the insertion site changes color, is cool, or is numb.   You have chest pain or shortness of breath that does not go away with rest.   Your pulse feels irregular -- very slow (less than 60 beats/minute) or very fast (over 100 beats/minute).   You have dizziness, fainting, or you are very tired.   You are coughing up blood or yellow or green mucus.   You have chills or a fever over 101°F (38.3°C).    If there is bleeding at the catheter insertion site, apply pressure for 10 minutes.  If bleeding persists, call 911, and continue to hold pressure until advanced medical support arrives.        Exercising Safely After Percutaneous Coronary Intervention (PCI)  After percutaneous coronary intervention (PCI), which involves angioplasty and often stenting, it's important to focus on your heart health. Exercise can help strengthen your heart. It can also help you feel good and improve your overall health. Talk with your health care provider or cardiac rehab team member about good options for you.  Start slowly. Work up to more vigorous exercise as you get stronger. Aim for at least 150 minutes of exercise each week.  Include aerobic activities. These make the heart beat faster. They work the heart and lungs, and improve the body's ability to use oxygen. Good choices include walking, swimming, and biking .  Always follow your doctor's recommendation for exercise.   You have been referred to cardiac rehabilitation, which is important for your recovery.  You may contact [facility] at [phone] to learn more and schedule a visit.        Lifestyle Management After Percutaneous Coronary Intervention (PCI)  Percutaneous coronary intervention (PCI)  involves angioplasty and often stenting. This procedure can open arteries and relieve symptoms. But, it doesn't cure coronary artery  disease. New blockages can still form. You need to take steps to prevent this by managing risk factors. Doing so will help make your heart and arteries healthier. Your doctor may prescribe cardiac rehabilitation to help with this lifelong process.  Understanding risk factors  Some risk factors for coronary artery disease can be controlled. These include smoking, high blood pressure, cholesterol, diabetes, and obesity. They can be managed with medication, diet, and exercise. Support and counseling can also play a role. The effort will pay off! Managing risk factors can help you be more active, feel better, and reduce the risk of heart attack.    If you smoke, quit!  If your doctor has been urging you to quit smoking, it's for good reasons. Smoking damages your heart, blood vessels, and lungs. The good news is that quitting can halt or even reverse the damage of smoking. To quit now:  Get medical help. Ask your doctor for advice on stop-smoking programs. Also ask about medications or nicotine replacement therapy products that may help you quit smoking.  Get support. Join a support group. Ask for help from your family and friends.  Don't give up. It often takes several tries to succeed in quitting smoking.  Avoid secondhand smoke. Ask family and friends not to smoke around you.

## 2024-02-16 ENCOUNTER — TELEPHONE (OUTPATIENT)
Dept: CARDIOLOGY | Facility: MEDICAL CENTER | Age: 69
End: 2024-02-16
Payer: MEDICARE

## 2024-02-16 NOTE — OR NURSING
1434 pt arrived from cath lab, report received. Pt placed on monitor, B/P every 15 minutes per MD order. Right radial TR band in place, 13 ml of air in band per RN report. Site clean, dry and soft. Pt was given water and a snack. Pt instructed to limit right wrist movement.     1600 2 ml of air was removed from TR band no S/S of bleeding    1615 2 ml of air was removed from TR band no S/S of bleeding    1630 3 ml of air was removed from TR band no S/S of bleeding    1645 3 ml of air was removed from TR band no S/S of bleeding    1700 3 ml of air was removed from TR band no S/S of bleeding    1730 TR band removed and dressing placed to right radial site. Pt and family given D/C instructions, pt verbalized understanding, all questions answered. Pt to be monitored until     1830 per Dr. Alvarenga, report given to accepting RN.

## 2024-02-16 NOTE — TELEPHONE ENCOUNTER
Let him know I reviewed his cath with Dr Alvarenga and he got an excellent result.  Make sure he knows to take aspirin and plavix every day and follow up as scheduled    Future Appointments   Date Time Provider Department Center   3/13/2024 10:45 AM Elliot Khanna M.D. [facility] None   4/18/2024  7:30 AM Funmilayo Hatfield M.D. [facility]

## 2024-02-16 NOTE — OR NURSING
Took over care of patient from BENJIE Worley. Patient bedrest observation time complete. Removed Iv, covered with guaze and tegaderm. Right radial site dressing CDI. Discharged into care of wife.

## 2024-02-21 NOTE — TELEPHONE ENCOUNTER
Attempted to call pt, unable to reach.  Left detailed voicemail regarding CW recommendations to return this call at their earliest convenience or reply to [employer] if he has further questions or concerns.    Upon chart review, pt is active on [employer].  Last login 2/16/2024.  [employer] message sent regarding CW recommendations, awaiting pt response.

## 2024-03-13 ENCOUNTER — OFFICE VISIT (OUTPATIENT)
Dept: CARDIOLOGY | Facility: MEDICAL CENTER | Age: 69
End: 2024-03-13
Attending: INTERNAL MEDICINE
Payer: MEDICARE

## 2024-03-13 VITALS
WEIGHT: 204 LBS | BODY MASS INDEX: 28.56 KG/M2 | HEART RATE: 48 BPM | RESPIRATION RATE: 16 BRPM | OXYGEN SATURATION: 97 % | HEIGHT: 71 IN | DIASTOLIC BLOOD PRESSURE: 60 MMHG | SYSTOLIC BLOOD PRESSURE: 112 MMHG

## 2024-03-13 DIAGNOSIS — I20.0 UNSTABLE ANGINA (HCC): ICD-10-CM

## 2024-03-13 DIAGNOSIS — Z95.5 PRESENCE OF DRUG COATED STENT IN ANTERIOR DESCENDING BRANCH OF LEFT CORONARY ARTERY: ICD-10-CM

## 2024-03-13 DIAGNOSIS — I25.10 CORONARY ARTERY DISEASE DUE TO LIPID RICH PLAQUE: ICD-10-CM

## 2024-03-13 DIAGNOSIS — I10 PRIMARY HYPERTENSION: Chronic | ICD-10-CM

## 2024-03-13 DIAGNOSIS — I45.10 INCOMPLETE RIGHT BUNDLE BRANCH BLOCK (RBBB) DETERMINED BY ELECTROCARDIOGRAPHY: ICD-10-CM

## 2024-03-13 DIAGNOSIS — I25.83 CORONARY ARTERY DISEASE DUE TO LIPID RICH PLAQUE: ICD-10-CM

## 2024-03-13 DIAGNOSIS — I10 HYPERTENSION, UNSPECIFIED TYPE: ICD-10-CM

## 2024-03-13 DIAGNOSIS — I10 ESSENTIAL HYPERTENSION: ICD-10-CM

## 2024-03-13 DIAGNOSIS — E78.5 DYSLIPIDEMIA: ICD-10-CM

## 2024-03-13 PROCEDURE — 3074F SYST BP LT 130 MM HG: CPT | Performed by: INTERNAL MEDICINE

## 2024-03-13 PROCEDURE — 99214 OFFICE O/P EST MOD 30 MIN: CPT | Performed by: INTERNAL MEDICINE

## 2024-03-13 PROCEDURE — 99213 OFFICE O/P EST LOW 20 MIN: CPT | Performed by: INTERNAL MEDICINE

## 2024-03-13 PROCEDURE — 3078F DIAST BP <80 MM HG: CPT | Performed by: INTERNAL MEDICINE

## 2024-03-13 RX ORDER — CLOPIDOGREL BISULFATE 75 MG/1
75 TABLET ORAL DAILY
Qty: 100 TABLET | Refills: 3 | Status: SHIPPED | OUTPATIENT
Start: 2024-03-13 | End: 2024-03-13 | Stop reason: SDUPTHER

## 2024-03-13 RX ORDER — CLOPIDOGREL BISULFATE 75 MG/1
75 TABLET ORAL DAILY
Qty: 100 TABLET | Refills: 3 | Status: SHIPPED
Start: 2024-03-13

## 2024-03-13 RX ORDER — AMLODIPINE BESYLATE 2.5 MG/1
2.5 TABLET ORAL
Qty: 100 TABLET | Refills: 3 | Status: SHIPPED | OUTPATIENT
Start: 2024-03-13

## 2024-03-13 RX ORDER — ROSUVASTATIN CALCIUM 5 MG/1
5 TABLET, COATED ORAL EVERY EVENING
Qty: 100 TABLET | Refills: 3 | Status: SHIPPED | OUTPATIENT
Start: 2024-03-13

## 2024-03-13 RX ORDER — LISINOPRIL 40 MG/1
40 TABLET ORAL
Qty: 100 TABLET | Refills: 2 | Status: SHIPPED | OUTPATIENT
Start: 2024-03-13

## 2024-03-13 RX ORDER — METOPROLOL SUCCINATE 50 MG/1
50 TABLET, EXTENDED RELEASE ORAL DAILY
Qty: 100 TABLET | Refills: 3 | Status: SHIPPED | OUTPATIENT
Start: 2024-03-13

## 2024-03-13 ASSESSMENT — FIBROSIS 4 INDEX: FIB4 SCORE: 1.05

## 2024-03-13 NOTE — PROGRESS NOTES
Chief Complaint   Patient presents with    Hypertension     F/v dx: Essential hypertension       Subjective     Chucky Sky Chung III is a 68 y.o. male who presents today with CAD post PCI    Doing great post PCI to Diag    Some palps      Past Medical History:   Diagnosis Date    Asthma Recently    Mild    Bowel habit changes     diarrhea    Bronchitis     as a child    Cancer (HCC) ?    Squamous carcinoma on left check a few years back    Chickenpox     Coronary artery disease due to lipid rich plaque - PCI to Diagonal for UA 03/13/2024    Gambian measles     Heart burn ?    Throughout my life but rarely    Hiatus hernia syndrome 2012    From [employer]    High cholesterol     Indigestion ?    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     Family History   Problem Relation Age of Onset    Diabetes Mother     Cancer Mother         Breast Cancer    Hypertension Mother     Stroke Father     Alcohol abuse Father         3-4 Cocktails Daily    Psychiatric Illness Maternal Grandmother         Depression/Anxiety    Cancer Other         prostate cancer    Psychiatric Illness Other         depression    Migraines Other     Cancer Brother         Skin Cancer    Alcohol abuse Maternal Uncle         Daily not sure of #    Hyperlipidemia Neg Hx      Social History     Socioeconomic History    Marital status:      Spouse name: Not on file    Number of children: Not on file    Years of education: Not on file    Highest education level: Bachelor's degree (e.g., BA, AB, BS)   Occupational History    Not on file   Tobacco Use    Smoking  status: Never    Smokeless tobacco: Never   Vaping Use    Vaping Use: Never used   Substance and Sexual Activity    Alcohol use: Not Currently     Comment: states none 10/30/2023; Under 10 drinks/week    Drug use: Never    Sexual activity: Yes     Partners: Female   Other Topics Concern    Not on file   Social History Narrative    Not on file     Social Determinants of Health     Financial Resource Strain: Low Risk  (10/12/2022)    Overall Financial Resource Strain (CARDIA)     Difficulty of Paying Living Expenses: Not hard at all   Food Insecurity: No Food Insecurity (10/12/2022)    Hunger Vital Sign     Worried About Running Out of Food in the Last Year: Never true     Ran Out of Food in the Last Year: Never true   Transportation Needs: No Transportation Needs (10/12/2022)    PRAPARE - Transportation     Lack of Transportation (Medical): No     Lack of Transportation (Non-Medical): No   Physical Activity: Sufficiently Active (10/12/2022)    Exercise Vital Sign     Days of Exercise per Week: 7 days     Minutes of Exercise per Session: 60 min   Stress: No Stress Concern Present (10/12/2022)    Citizen of [place] of Occupational [employer] - Occupational Stress Questionnaire     Feeling of Stress : Only a little   Social Connections: Socially Integrated (10/12/2022)    Social Connection and Isolation Panel [NHANES]     Frequency of Communication with Friends and Family: More than three times a week     Frequency of Social Gatherings with Friends and Family: More than three times a week     Attends Worship Services: More than 4 times per year     Active Member of Clubs or Organizations: Yes     Attends Club or Organization Meetings: More than 4 times per year     Marital Status:    Intimate Partner Violence: Not on file   Housing Stability: Low Risk  (10/12/2022)    Housing Stability Vital Sign     Unable to Pay for Housing in the Last Year: No     Number of Places Lived in the Last Year: 1     Unstable Housing in  "the Last Year: No     Allergies   Allergen Reactions    Urania Hives and Itching     Outpatient Encounter Medications as of 3/13/2024   Medication Sig Dispense Refill    clopidogrel (PLAVIX) 75 MG Tab Take 1 Tablet by mouth every day. 90 Tablet 1    metoprolol SR (TOPROL XL) 50 MG TABLET SR 24 HR Take 1 Tablet by mouth every day. (Patient taking differently: Take 25 mg by mouth every day.) 90 Tablet 3    lisinopril (PRINIVIL) 40 MG tablet Take 1 Tablet by mouth every day. 100 Tablet 2    rosuvastatin (CRESTOR) 5 MG Tab Take 1 Tablet by mouth every evening. 90 Tablet 3    amLODIPine (NORVASC) 2.5 MG Tab Take 1 Tablet by mouth every day. (Patient taking differently: Take 2.5 mg by mouth at bedtime.) 90 Tablet 1    fluoxetine (PROZAC) 40 MG capsule Take 1 Capsule by mouth every day. 90 Capsule 1    omeprazole (PRILOSEC) 20 MG delayed-release capsule TAKE 1 CAPSULE BY MOUTH EVERY DAY (Patient taking differently: Take 20 mg by mouth as needed.) 30 Capsule 1    albuterol 108 (90 Base) MCG/ACT Aero Soln inhalation aerosol INHALE 2 PUFFS EVERY FOUR HOURS AS NEEDED FOR SHORTNESS OF BREATH. 8.5 Each 1    Loperamide HCl (IMODIUM PO) Take  by mouth every day.      cetirizine (ZYRTEC) 10 MG Tab Take 10 mg by mouth as needed.      aspirin 81 MG tablet Take 81 mg by mouth every day.      Multiple Vitamins-Minerals (MULTIVITAMIN ADULT PO) Take 1 Tab by mouth every day.      Vitamin D, Ergocalciferol, 50 MCG (2000 UT) Cap Take 3 Tabs by mouth every day.      Saw Palmetto, Serenoa repens, (SAW PALMETTO PO) Take 320 mg by mouth every day.       No facility-administered encounter medications on file as of 3/13/2024.     ROS           Objective     /60 (BP Location: Left arm, Patient Position: Sitting, BP Cuff Size: Adult)   Pulse (!) 48   Resp 16   Ht 1.803 m (5' 11\")   Wt 92.5 kg (204 lb)   SpO2 97%   BMI 28.45 kg/m²     Physical Exam  Constitutional:       General: He is not in acute distress.     Appearance: He is not " diaphoretic.   Eyes:      General: No scleral icterus.  Neck:      Vascular: No JVD.   Cardiovascular:      Rate and Rhythm: Normal rate.      Heart sounds: Normal heart sounds. No murmur heard.     No friction rub. No gallop.   Pulmonary:      Effort: No respiratory distress.      Breath sounds: No wheezing or rales.   Abdominal:      General: Bowel sounds are normal.      Palpations: Abdomen is soft.   Musculoskeletal:      Right lower leg: No edema.      Left lower leg: No edema.   Skin:     Findings: No rash.   Neurological:      Mental Status: He is alert. Mental status is at baseline.   Psychiatric:         Mood and Affect: Mood normal.              We reviewed in person the most recent labs  Recent Results (from the past 5040 hour(s))   HEMOGLOBIN A1C    Collection Time: 09/27/23  8:16 AM   Result Value Ref Range    Glycohemoglobin 5.4 4.0 - 5.6 %    Est Avg Glucose 108 mg/dL   PROSTATE SPECIFIC AG DIAGNOSTIC    Collection Time: 09/27/23  8:16 AM   Result Value Ref Range    Prostatic Specific Antigen Tot 1.63 0.00 - 4.00 ng/mL   VITAMIN D,25 HYDROXY (DEFICIENCY)    Collection Time: 09/27/23  8:16 AM   Result Value Ref Range    25-Hydroxy   Vitamin D 25 54 30 - 100 ng/mL   TSH    Collection Time: 09/27/23  8:16 AM   Result Value Ref Range    TSH 2.070 0.380 - 5.330 uIU/mL   Lipid Profile    Collection Time: 09/27/23  8:16 AM   Result Value Ref Range    Cholesterol,Tot 101 100 - 199 mg/dL    Triglycerides 56 0 - 149 mg/dL    HDL 50 >=40 mg/dL    LDL 40 <100 mg/dL   FASTING STATUS    Collection Time: 09/27/23  8:16 AM   Result Value Ref Range    Fasting Status Fasting    Comp Metabolic Panel    Collection Time: 09/27/23  8:16 AM   Result Value Ref Range    Sodium 139 135 - 145 mmol/L    Potassium 4.5 3.6 - 5.5 mmol/L    Chloride 104 96 - 112 mmol/L    Co2 27 20 - 33 mmol/L    Anion Gap 8.0 7.0 - 16.0    Glucose 93 65 - 99 mg/dL    Bun 16 8 - 22 mg/dL    Creatinine 0.90 0.50 - 1.40 mg/dL    Calcium 8.8 8.5 - 10.5  mg/dL    Correct Calcium 8.6 8.5 - 10.5 mg/dL    AST(SGOT) 19 12 - 45 U/L    ALT(SGPT) 25 2 - 50 U/L    Alkaline Phosphatase 85 30 - 99 U/L    Total Bilirubin 0.6 0.1 - 1.5 mg/dL    Albumin 4.2 3.2 - 4.9 g/dL    Total Protein 6.7 6.0 - 8.2 g/dL    Globulin 2.5 1.9 - 3.5 g/dL    A-G Ratio 1.7 g/dL   CBC WITH DIFFERENTIAL    Collection Time: 09/27/23  8:16 AM   Result Value Ref Range    WBC 4.9 4.8 - 10.8 K/uL    RBC 4.59 (L) 4.70 - 6.10 M/uL    Hemoglobin 13.9 (L) 14.0 - 18.0 g/dL    Hematocrit 41.1 (L) 42.0 - 52.0 %    MCV 89.5 81.4 - 97.8 fL    MCH 30.3 27.0 - 33.0 pg    MCHC 33.8 32.3 - 36.5 g/dL    RDW 40.2 35.9 - 50.0 fL    Platelet Count 255 164 - 446 K/uL    MPV 9.5 9.0 - 12.9 fL    Neutrophils-Polys 64.70 44.00 - 72.00 %    Lymphocytes 20.30 (L) 22.00 - 41.00 %    Monocytes 11.10 0.00 - 13.40 %    Eosinophils 2.90 0.00 - 6.90 %    Basophils 0.60 0.00 - 1.80 %    Immature Granulocytes 0.40 0.00 - 0.90 %    Nucleated RBC 0.00 0.00 - 0.20 /100 WBC    Neutrophils (Absolute) 3.16 1.82 - 7.42 K/uL    Lymphs (Absolute) 0.99 (L) 1.00 - 4.80 K/uL    Monos (Absolute) 0.54 0.00 - 0.85 K/uL    Eos (Absolute) 0.14 0.00 - 0.51 K/uL    Baso (Absolute) 0.03 0.00 - 0.12 K/uL    Immature Granulocytes (abs) 0.02 0.00 - 0.11 K/uL    NRBC (Absolute) 0.00 K/uL   ESTIMATED GFR    Collection Time: 09/27/23  8:16 AM   Result Value Ref Range    GFR (CKD-EPI) 94 >60 mL/min/1.73 m 2   TESTOSTERONE SERUM    Collection Time: 12/07/23  8:21 AM   Result Value Ref Range    Testosterone,Total 292 175 - 781 ng/dL   CBC WITH DIFFERENTIAL    Collection Time: 12/07/23  8:21 AM   Result Value Ref Range    WBC 7.0 4.8 - 10.8 K/uL    RBC 4.71 4.70 - 6.10 M/uL    Hemoglobin 14.6 14.0 - 18.0 g/dL    Hematocrit 43.2 42.0 - 52.0 %    MCV 91.7 81.4 - 97.8 fL    MCH 31.0 27.0 - 33.0 pg    MCHC 33.8 32.3 - 36.5 g/dL    RDW 41.7 35.9 - 50.0 fL    Platelet Count 245 164 - 446 K/uL    MPV 9.6 9.0 - 12.9 fL    Neutrophils-Polys 74.80 (H) 44.00 - 72.00 %     Lymphocytes 11.50 (L) 22.00 - 41.00 %    Monocytes 10.20 0.00 - 13.40 %    Eosinophils 2.80 0.00 - 6.90 %    Basophils 0.30 0.00 - 1.80 %    Immature Granulocytes 0.40 0.00 - 0.90 %    Nucleated RBC 0.00 0.00 - 0.20 /100 WBC    Neutrophils (Absolute) 5.25 1.82 - 7.42 K/uL    Lymphs (Absolute) 0.81 (L) 1.00 - 4.80 K/uL    Monos (Absolute) 0.72 0.00 - 0.85 K/uL    Eos (Absolute) 0.20 0.00 - 0.51 K/uL    Baso (Absolute) 0.02 0.00 - 0.12 K/uL    Immature Granulocytes (abs) 0.03 0.00 - 0.11 K/uL    NRBC (Absolute) 0.00 K/uL   URINALYSIS,CULTURE IF INDICATED    Collection Time: 12/07/23  8:21 AM    Specimen: Urine   Result Value Ref Range    Color Yellow     Character Clear     Specific Gravity 1.013 <1.035    Ph 6.0 5.0 - 8.0    Glucose Negative Negative mg/dL    Ketones Negative Negative mg/dL    Protein Negative Negative mg/dL    Bilirubin Negative Negative    Urobilinogen, Urine 0.2 Negative    Nitrite Negative Negative    Leukocyte Esterase Negative Negative    Occult Blood Negative Negative    Micro Urine Req see below    APTT    Collection Time: 02/13/24  9:56 AM   Result Value Ref Range    APTT 28.9 24.7 - 36.0 sec   PT    Collection Time: 02/13/24  9:56 AM   Result Value Ref Range    PT 13.2 12.0 - 14.6 sec    INR 0.99 0.87 - 1.13   Comp Metabolic Panel    Collection Time: 02/13/24  9:56 AM   Result Value Ref Range    Sodium 139 135 - 145 mmol/L    Potassium 4.6 3.6 - 5.5 mmol/L    Chloride 102 96 - 112 mmol/L    Co2 29 20 - 33 mmol/L    Anion Gap 8.0 7.0 - 16.0    Glucose 91 65 - 99 mg/dL    Bun 17 8 - 22 mg/dL    Creatinine 0.93 0.50 - 1.40 mg/dL    Calcium 8.9 8.5 - 10.5 mg/dL    Correct Calcium 8.7 8.5 - 10.5 mg/dL    AST(SGOT) 26 12 - 45 U/L    ALT(SGPT) 47 2 - 50 U/L    Alkaline Phosphatase 80 30 - 99 U/L    Total Bilirubin 0.4 0.1 - 1.5 mg/dL    Albumin 4.2 3.2 - 4.9 g/dL    Total Protein 6.6 6.0 - 8.2 g/dL    Globulin 2.4 1.9 - 3.5 g/dL    A-G Ratio 1.8 g/dL   CBC WITHOUT DIFFERENTIAL    Collection Time:  24  9:56 AM   Result Value Ref Range    WBC 5.6 4.8 - 10.8 K/uL    RBC 4.65 (L) 4.70 - 6.10 M/uL    Hemoglobin 14.3 14.0 - 18.0 g/dL    Hematocrit 41.5 (L) 42.0 - 52.0 %    MCV 89.2 81.4 - 97.8 fL    MCH 30.8 27.0 - 33.0 pg    MCHC 34.5 32.3 - 36.5 g/dL    RDW 40.2 35.9 - 50.0 fL    Platelet Count 242 164 - 446 K/uL    MPV 9.5 9.0 - 12.9 fL   ESTIMATED GFR    Collection Time: 24  9:56 AM   Result Value Ref Range    GFR (CKD-EPI) 90 >60 mL/min/1.73 m 2   EKG    Collection Time: 24 10:03 AM   Result Value Ref Range    Report       [facility]    Test Date:  2024  Pt Name:    PECK CUNLIONEL [facility]          Department: [facility]  MRN:        [id number]                      Room:  Gender:     Male                         Technician: ZULEIMA  :        [id number]                   Requested By:JUAN YATES  Order #:    [id number]                    Reading MD: Rigo Nation MD    Measurements  Intervals                                Axis  Rate:       44                           P:          -9  VA:         159                          QRS:        -24  QRSD:       124                          T:          54  QT:         489  QTc:        419    Interpretive Statements  Sinus bradycardia  Right bundle branch block  Compared to ECG 2023 11:11:10  Left anterior fascicular block no longer present    Electronically Signed On 2024 20:28:41 PST by Rigo Nation MD     POCT activated clotting time device results    Collection Time: 02/15/24  2:09 PM   Result Value Ref Range    Istat Activated Clotting Time 266 (H) 74 - 137 sec   EKG STAT    Collection Time: 02/15/24  4:21 PM   Result Value Ref Range    Report       [facility]    Test Date:  2024-02-15  Pt Name:    LIV MCNEAL III          Department: [facility]  MRN:        [id number]                      Room:       [facility]  Gender:     Male                         Technician: SONAL  :        1955                    Requested By:RAFAEL NICE  Order #:    [id number]                    Reading MD: Elliot Khanna MD    Measurements  Intervals                                Axis  Rate:       41                           P:          -16  VA:         174                          QRS:        -47  QRSD:       125                          T:          32  QT:         547  QTc:        452    Interpretive Statements  Sinus bradycardia  RBBB and LAFB  Compared to ECG 02/13/2024 10:03:09  NO SIGNIFICANT CHANGES  Electronically Signed On 02- 23:20:24 PST by Elliot Khanna MD         Assessment & Plan     1. Incomplete right bundle branch block (RBBB) determined by electrocardiography        2. Coronary artery disease due to lipid rich plaque  US-TOTAL VASCULAR SCREENING (S/P)      3. Presence of drug coated stent in anterior descending branch of left coronary artery  Referral to Cardiac Rehab      4. Primary hypertension            Medical Decision Making: Today's Assessment/Status/Plan:        It was my pleasure to meet with Mr. Sheldon WISE.    We addressed the management of hypertension at today's visit. Blood pressure is well controlled.  We specifically assessed the labs on hypertension treatment    We addressed the management of dyslipidemia and atherosclerosis at today's visit. He is on appropriate statin.    We addressed the management of atherosclerotic artery disease.  He is on proper antiplatelet, cholesterol management and beta-blockers as appropriate.  We addressed the potential side effects and laboratory follow-up for these medications.    I will see Mr. Sheldon WISE back in 1 year time and encouraged him to follow up with us over the phone or electronically using my MyChart as issues arise.    It is my pleasure to participate in the care of Mr. Sheldon WISE.  Please do not hesitate to contact me with questions or concerns.    Elliot Khanna MD PhD FACC  Cardiologist [facility]    Please note that this dictation was created using voice recognition software. There may be errors I did not discover before finalizing the note.     () Today's E/M visit is associated with medical care services that serve as the continuing focal point for all needed health care services and/or with medical care services that  are part of ongoing care related to a patient's single, serious condition, or a complex condition: This includes  furnishing services to patients on an ongoing basis that result in care that is personalized  to the patient. The services result in a comprehensive, longitudinal, and continuous  relationship with the patient and involve delivery of team-based care that is accessible, coordinated with other practitioners and providers, and integrated with the broader health  care landscape.

## 2024-03-13 NOTE — PATIENT INSTRUCTIONS
A - Antiplatelet - Clopidogrel (PLAVIX) reduces your risk of cardiac event by 27% compared to Aspirin 81 mg daily (HOST EXAM study 2021), prasrugrel (EFFIENT), ticagrelor (BRILINTA)) may be used for the first year.  Aspirin 81 mg daily is associated with a 20% less use of heart event and is used the first year after a cardiac event, stent or CABG.  B - Blood Pressure Control - reduces your risk or heart attack and stroke, the goal is <130/80.  C - Cholesterol Management - statins dramatically reduce your risk; for those that are intolerant to statins, there are alternatives.  D - Diet - MEDITERRANEAN DIET or Cardiac rehab diets, [email or web address].  E - Exercise - at least 2.5 hours of moderate exercise weekly  (typical brisk walking or similar activity).  F - Fats - VASCEPA, or EPA Fish oil (if Vascepa too expensive) for elevated triglycerides (REDUCE IT trial showed reduction from 22% 5 year MACE to 17%).  G - Good Vibes - meditation, exercise, yoga, Pilates, mindfulness, Braxton-Chi, stress reduction.  H - Heart Failure - betablockers, sucubatril (ENTRESTO) 16% less risk of dying over 3 years, spironolactone, empagliflozin (JARDIANCE) 17% less risk of dying over 2 years, CRT +/- ICD.  I - Inflammation - Colchicine in the LoDoCo2 study in 2020 reduced the risk of heart attack by 30% in 2.5 year follow up.  R - Rehab - Cardiac Rehab reduces risk of dying by 13-24% and need to go to the hospital by 30% within the first year. Compared to regular Cardiac Rehab, Intensive Cardiac Rehab ([place] [facility]) was shown to reduce the risk of major events 17 to 11% and hospitalization for CHF from 8% to 2%. (Nutrients [id number](28):7423)  S - Smoking - never smoke, if you do smoke ask for help to quit for good. Patients who quit smoking after heart attack have 36% less likely risk of dying.  Resources are 1-800-QUIT-NOW Moodyo in addition to Chantix, bupropion (Zyban) or nicotine replacement  T - Type II Diabetes  - pills empagliflozin (JARDIANCE) 38% less risk of dying over 4 years, and/or weekly injections: tirzepatide (Mounjaro), semaglutide (Ozempic), liraglutide (Victoza), dulaglutide (Trulicity) ~26% less risk of MACE in 2 years.  V - Vaccines - Annual flu shot and COVID vaccine reduces the risk of serious cardiovascular complications from these deadly infections.  W - Weight - maintain a healthy weight. Semaglutide (WEGOVY) weekly injection was shown to reduce weight by 10% and heart events by 20% for patients with CAD and BMI > 27 in the SELECT trial (6.5% vs 8% in 48 month follow up [facility] 12/2023).      Work on at least 2.5 - 5 hours a week of moderate exercise    Please look into the following diets and incorporate them into your diet  LOW SALT DIET   KEEP YOUR SODIUM EQUAL TO CALORIES AND NO MORE THAN DOUBLE THE CALORIES FOR A LOW SALT DIET    Cardiosmart.org - great resource for American College of Cardiology on heart disease prevention and treatment    FOR TREATMENT OR PREVENTION OF CORONARY ARTERY DISEASE  These three programs are approved by Medicare/Insurers for those with heart disease  [place] - [facility]  Dr. Rojas's Program for Reversing Heart Disease - [place] Siegel's Cardiologist vegetarian-based  [facility] Cardiac Wellness Program - [place]-based mind-body Program    Mediterranean Diet has been shown to be a hearty healthy diet.    This is a commonly referenced Program  Dr Concepcion - [place] over [place] (book and documentary) - vegetarian-based    FOR TREATMENT OF BLOOD PRESSURE  DASH DIET - American Heart Association for treatment of HYPERTENSION    FOR TREATMENT OF BAD CHOLESTEROL/FATS  REDUCE PROCESSED SUGAR AS MUCH AS POSSIBLE  INCREASE WHOLE GRAINS/VEGETABLES  INCREASE FIBER    Lowering total cholesterol and LDL (bad) cholesterol:  - Eat leaner cuts of meat, or eliminate altogether if possible red meat, and frequently substitute fish or chicken.  - Limit saturated  fat to no more than 7-10% of total calories no more than 10 g per day is recommended. Some sources of saturated fat include butter, animal fats, hydrogenated vegetable fats and oils, many desserts, whole milk dairy products.  - Replaced saturated fats with polyunsaturated fats and monounsaturated fats. Foods high in monounsaturated fat include nuts, canola oil, avocados, and olives.  - Limit trans fat (processed foods) and replaced with fresh fruits and vegetables  - Recommend nonfat dairy products  - Increase substantially the amount of soluble fiber intake (legumes such as beans, fruit, whole grains).  - Consider nutritional supplements: plant sterile spreads such as Benecol, fish oil,  flaxseed oil, omega-3 acids EPA capsules 2000 mg twice a day, or viscous fiber such as Metamucil  - Attain ideal weight and regular exercise (at least 30 minutes per day of moderate exercise)  ASK ABOUT STATIN OR NON STATIN MEDICATION TO REDUCE YOUR LDL AND HEART RISK    Lowering triglycerides:  - Reduce intake of simple sugar: Desserts, candy, pastries, honey, sodas, sugared cereals, yogurt, Gatorade, sports bars, canned fruit, smoothies, fruit juice, coffee drinks  - Reduced intake of refined starches: Refined Pasta, most bread  - Reduce or abstain from alcohol  - Increase omega-3 fatty acids: [place], Trout, Mackerel, Herring, Alb[place] tuna and supplements  - Attain ideal weight and regular exercise (at least 30 minutes per day of moderate exercise)  ASK ABOUT PURIFIED OMEGA 3 EPA or FISH OIL TO REDUCE YOUR TG AND HEART RISK    Elevating HDL (good) cholesterol:  - Increase physical activity  - Increase omega-3 fatty acids and supplements as listed above  - Incorporating appropriate amounts of monounsaturated fats such as nuts, olive oil, canola oil, avocados, olives  - Stop smoking  - Attain ideal weight and regular exercise (at least 30 minutes per day of moderate exercise)